# Patient Record
Sex: FEMALE | Race: WHITE | Employment: OTHER | URBAN - METROPOLITAN AREA
[De-identification: names, ages, dates, MRNs, and addresses within clinical notes are randomized per-mention and may not be internally consistent; named-entity substitution may affect disease eponyms.]

---

## 2018-10-18 ENCOUNTER — OFFICE VISIT (OUTPATIENT)
Dept: GASTROENTEROLOGY | Age: 58
End: 2018-10-18
Payer: COMMERCIAL

## 2018-10-18 ENCOUNTER — HOSPITAL ENCOUNTER (OUTPATIENT)
Age: 58
Discharge: HOME | End: 2018-10-18
Payer: COMMERCIAL

## 2018-10-18 VITALS
WEIGHT: 117.9 LBS | SYSTOLIC BLOOD PRESSURE: 124 MMHG | BODY MASS INDEX: 21.56 KG/M2 | HEART RATE: 112 BPM | DIASTOLIC BLOOD PRESSURE: 86 MMHG

## 2018-10-18 DIAGNOSIS — R10.13 ABDOMINAL PAIN, EPIGASTRIC: ICD-10-CM

## 2018-10-18 DIAGNOSIS — K29.70 GASTRITIS WITHOUT BLEEDING, UNSPECIFIED CHRONICITY, UNSPECIFIED GASTRITIS TYPE: Primary | ICD-10-CM

## 2018-10-18 DIAGNOSIS — K29.70 GASTRITIS WITHOUT BLEEDING, UNSPECIFIED CHRONICITY, UNSPECIFIED GASTRITIS TYPE: ICD-10-CM

## 2018-10-18 DIAGNOSIS — R19.7 DIARRHEA, UNSPECIFIED TYPE: ICD-10-CM

## 2018-10-18 LAB
ABSOLUTE EOS #: 0.3 K/UL (ref 0–0.4)
ABSOLUTE IMMATURE GRANULOCYTE: ABNORMAL K/UL (ref 0–0.3)
ABSOLUTE LYMPH #: 1.6 K/UL (ref 1–4.8)
ABSOLUTE MONO #: 0.4 K/UL (ref 0.1–1.3)
ALBUMIN SERPL-MCNC: 4.8 G/DL (ref 3.5–5.2)
ALBUMIN/GLOBULIN RATIO: ABNORMAL (ref 1–2.5)
ALP BLD-CCNC: 71 U/L (ref 35–104)
ALT SERPL-CCNC: 13 U/L (ref 5–33)
ANION GAP SERPL CALCULATED.3IONS-SCNC: 12 MMOL/L (ref 9–17)
AST SERPL-CCNC: 18 U/L
BASOPHILS # BLD: 1 % (ref 0–2)
BASOPHILS ABSOLUTE: 0.1 K/UL (ref 0–0.2)
BILIRUB SERPL-MCNC: 0.51 MG/DL (ref 0.3–1.2)
BUN BLDV-MCNC: 15 MG/DL (ref 6–20)
BUN/CREAT BLD: ABNORMAL (ref 9–20)
CALCIUM SERPL-MCNC: 10.1 MG/DL (ref 8.6–10.4)
CHLORIDE BLD-SCNC: 102 MMOL/L (ref 98–107)
CO2: 28 MMOL/L (ref 20–31)
CREAT SERPL-MCNC: 0.89 MG/DL (ref 0.5–0.9)
DIFFERENTIAL TYPE: ABNORMAL
EOSINOPHILS RELATIVE PERCENT: 6 % (ref 0–4)
GFR AFRICAN AMERICAN: >60 ML/MIN
GFR NON-AFRICAN AMERICAN: >60 ML/MIN
GFR SERPL CREATININE-BSD FRML MDRD: ABNORMAL ML/MIN/{1.73_M2}
GFR SERPL CREATININE-BSD FRML MDRD: ABNORMAL ML/MIN/{1.73_M2}
GLUCOSE BLD-MCNC: 100 MG/DL (ref 70–99)
HCT VFR BLD CALC: 39.3 % (ref 36–46)
HEMOGLOBIN: 13.3 G/DL (ref 12–16)
IMMATURE GRANULOCYTES: ABNORMAL %
LIPASE: 77 U/L (ref 13–60)
LYMPHOCYTES # BLD: 29 % (ref 24–44)
MCH RBC QN AUTO: 30.3 PG (ref 26–34)
MCHC RBC AUTO-ENTMCNC: 33.8 G/DL (ref 31–37)
MCV RBC AUTO: 89.6 FL (ref 80–100)
MONOCYTES # BLD: 8 % (ref 1–7)
NRBC AUTOMATED: ABNORMAL PER 100 WBC
PDW BLD-RTO: 12.9 % (ref 11.5–14.9)
PLATELET # BLD: 225 K/UL (ref 150–450)
PLATELET ESTIMATE: ABNORMAL
PMV BLD AUTO: 9.1 FL (ref 6–12)
POTASSIUM SERPL-SCNC: 4.4 MMOL/L (ref 3.7–5.3)
RBC # BLD: 4.39 M/UL (ref 4–5.2)
RBC # BLD: ABNORMAL 10*6/UL
SEG NEUTROPHILS: 56 % (ref 36–66)
SEGMENTED NEUTROPHILS ABSOLUTE COUNT: 3 K/UL (ref 1.3–9.1)
SODIUM BLD-SCNC: 142 MMOL/L (ref 135–144)
TOTAL PROTEIN: 7.5 G/DL (ref 6.4–8.3)
TSH SERPL DL<=0.05 MIU/L-ACNC: 2.86 MIU/L (ref 0.3–5)
WBC # BLD: 5.4 K/UL (ref 3.5–11)
WBC # BLD: ABNORMAL 10*3/UL

## 2018-10-18 PROCEDURE — 80053 COMPREHEN METABOLIC PANEL: CPT

## 2018-10-18 PROCEDURE — 36415 COLL VENOUS BLD VENIPUNCTURE: CPT

## 2018-10-18 PROCEDURE — 83690 ASSAY OF LIPASE: CPT

## 2018-10-18 PROCEDURE — 83516 IMMUNOASSAY NONANTIBODY: CPT

## 2018-10-18 PROCEDURE — 84443 ASSAY THYROID STIM HORMONE: CPT

## 2018-10-18 PROCEDURE — 99244 OFF/OP CNSLTJ NEW/EST MOD 40: CPT | Performed by: INTERNAL MEDICINE

## 2018-10-18 PROCEDURE — 85025 COMPLETE CBC W/AUTO DIFF WBC: CPT

## 2018-10-18 RX ORDER — PANTOPRAZOLE SODIUM 40 MG/1
40 TABLET, DELAYED RELEASE ORAL 2 TIMES DAILY
Refills: 2 | COMMUNITY
Start: 2018-10-08 | End: 2018-11-16

## 2018-10-18 RX ORDER — IBUPROFEN 800 MG/1
800 TABLET ORAL EVERY 8 HOURS PRN
Status: ON HOLD | COMMUNITY
Start: 2017-08-14 | End: 2018-11-07

## 2018-10-18 ASSESSMENT — ENCOUNTER SYMPTOMS
COUGH: 0
WHEEZING: 0
ANAL BLEEDING: 0
EYES NEGATIVE: 1
BLOOD IN STOOL: 0
DIARRHEA: 1
VOMITING: 0
ABDOMINAL DISTENTION: 0
CONSTIPATION: 0
TROUBLE SWALLOWING: 0
NAUSEA: 0
RECTAL PAIN: 0
ABDOMINAL PAIN: 0
CHOKING: 0

## 2018-10-18 NOTE — PROGRESS NOTES
Subjective:      Patient ID: Lexy Cerrato is a 66 y.o. female. HPI . Dr. Ceci Waller has requested that I see Lexy Cerrato for a consult for   1. Gastritis without bleeding, unspecified chronicity, unspecified gastritis type     . Pain  At night  pprtonix  1 week  Stress 2 yrs  Anxiety  Upper lt upper  Day time  Ok    Nausea  No  Bloating   Loose  Stools  3 - 4  timesno noct  bm 1 yr  Above symtos  6 weeks  Wt loss 8 lb  1 yr      Patient seen with the symptom of abdominal pain. The pain is mostly in the upper abdomen towards the left upper quadrant. Interestingly patient states that the pain is worse at night. During the daytime the pain is not noticeable significantly. No radiation of the pain. The pain is crampy ache like pain. Not related to micturition. No fever or chills. No history of trauma. Denies cough. No shortness of breath. Not associated with breathing. Patient last 8 pounds in the last 1 year. She does have constant nausea. No emesis. She denies abdominal bloating. Has 3-4 liquid stools a day. No hematochezia. No nocturnal bowel movements. Denies constipation. Patient has these issues in the last 6 weeks. Patient has significant anxiety. She also has stressful lifestyle. Past Medical, Family, and Social History reviewed and does contribute to the patient presenting condition. patient\"s PMH/PSH,SH,PSYCH hx, MEDs, ALLERGIES, and ROS was all reviewed and updated ion the appropriate sections    Review of Systems   Constitutional: Negative for activity change, appetite change, fatigue and fever. HENT: Negative for postnasal drip and trouble swallowing. Eyes: Negative. Respiratory: Negative for cough, choking and wheezing. Cardiovascular: Negative. Gastrointestinal: Positive for diarrhea. Negative for abdominal distention, abdominal pain, anal bleeding, blood in stool, constipation, nausea, rectal pain and vomiting. Endocrine: Negative.

## 2018-10-19 ENCOUNTER — TELEPHONE (OUTPATIENT)
Dept: GASTROENTEROLOGY | Age: 58
End: 2018-10-19

## 2018-10-19 LAB — TISSUE TRANSGLUTAMINASE IGA: 0.2 U/ML

## 2018-10-19 RX ORDER — POLYETHYLENE GLYCOL 3350 17 G/17G
POWDER, FOR SOLUTION ORAL
Qty: 255 G | Refills: 0 | Status: ON HOLD | OUTPATIENT
Start: 2018-10-19 | End: 2018-11-07 | Stop reason: ALTCHOICE

## 2018-10-19 NOTE — TELEPHONE ENCOUNTER
Pt calling about the pretesting you and her were talking about.  Please call her back as the colon is scheduled for monday

## 2018-10-24 ENCOUNTER — HOSPITAL ENCOUNTER (OUTPATIENT)
Dept: CT IMAGING | Age: 58
Discharge: HOME | End: 2018-10-26
Payer: COMMERCIAL

## 2018-10-24 DIAGNOSIS — R10.13 ABDOMINAL PAIN, EPIGASTRIC: ICD-10-CM

## 2018-10-24 PROCEDURE — 2580000003 HC RX 258: Performed by: INTERNAL MEDICINE

## 2018-10-24 PROCEDURE — 6360000004 HC RX CONTRAST MEDICATION: Performed by: INTERNAL MEDICINE

## 2018-10-24 PROCEDURE — 74177 CT ABD & PELVIS W/CONTRAST: CPT

## 2018-10-24 RX ORDER — 0.9 % SODIUM CHLORIDE 0.9 %
80 INTRAVENOUS SOLUTION INTRAVENOUS ONCE
Status: COMPLETED | OUTPATIENT
Start: 2018-10-24 | End: 2018-10-24

## 2018-10-24 RX ORDER — SODIUM CHLORIDE 0.9 % (FLUSH) 0.9 %
10 SYRINGE (ML) INJECTION PRN
Status: DISCONTINUED | OUTPATIENT
Start: 2018-10-24 | End: 2018-10-27 | Stop reason: HOSPADM

## 2018-10-24 RX ADMIN — IOHEXOL 50 ML: 240 INJECTION, SOLUTION INTRATHECAL; INTRAVASCULAR; INTRAVENOUS; ORAL at 16:31

## 2018-10-24 RX ADMIN — SODIUM CHLORIDE 80 ML: 9 INJECTION, SOLUTION INTRAVENOUS at 16:31

## 2018-10-24 RX ADMIN — IOPAMIDOL 75 ML: 755 INJECTION, SOLUTION INTRAVENOUS at 16:31

## 2018-10-24 RX ADMIN — Medication 10 ML: at 16:31

## 2018-10-30 ENCOUNTER — TELEPHONE (OUTPATIENT)
Dept: GASTROENTEROLOGY | Age: 58
End: 2018-10-30

## 2018-11-01 ENCOUNTER — TELEPHONE (OUTPATIENT)
Dept: GASTROENTEROLOGY | Age: 58
End: 2018-11-01

## 2018-11-01 DIAGNOSIS — R93.5 ABNORMAL CT OF THE ABDOMEN: Primary | ICD-10-CM

## 2018-11-02 ENCOUNTER — TELEPHONE (OUTPATIENT)
Dept: GASTROENTEROLOGY | Age: 58
End: 2018-11-02

## 2018-11-02 NOTE — TELEPHONE ENCOUNTER
Patient called today asking about how to schedule her EUS and if she has to follow up with the doctor, Lashaun Leon stated that that our schedulers would call her and that she will have to follow up with Yesi Che afterwards

## 2018-11-07 ENCOUNTER — ANESTHESIA (OUTPATIENT)
Dept: OPERATING ROOM | Age: 58
End: 2018-11-07
Payer: COMMERCIAL

## 2018-11-07 ENCOUNTER — ANESTHESIA EVENT (OUTPATIENT)
Dept: OPERATING ROOM | Age: 58
End: 2018-11-07
Payer: COMMERCIAL

## 2018-11-07 ENCOUNTER — HOSPITAL ENCOUNTER (OUTPATIENT)
Age: 58
Setting detail: OUTPATIENT SURGERY
Discharge: HOME | End: 2018-11-07
Attending: INTERNAL MEDICINE | Admitting: INTERNAL MEDICINE
Payer: COMMERCIAL

## 2018-11-07 VITALS
RESPIRATION RATE: 15 BRPM | DIASTOLIC BLOOD PRESSURE: 76 MMHG | OXYGEN SATURATION: 99 % | SYSTOLIC BLOOD PRESSURE: 127 MMHG

## 2018-11-07 VITALS
DIASTOLIC BLOOD PRESSURE: 86 MMHG | TEMPERATURE: 99 F | WEIGHT: 117 LBS | BODY MASS INDEX: 19.97 KG/M2 | SYSTOLIC BLOOD PRESSURE: 150 MMHG | HEART RATE: 50 BPM | OXYGEN SATURATION: 99 % | RESPIRATION RATE: 16 BRPM | HEIGHT: 64 IN

## 2018-11-07 PROCEDURE — 7100000031 HC ASPR PHASE II RECOVERY - ADDTL 15 MIN: Performed by: INTERNAL MEDICINE

## 2018-11-07 PROCEDURE — 3700000001 HC ADD 15 MINUTES (ANESTHESIA): Performed by: INTERNAL MEDICINE

## 2018-11-07 PROCEDURE — 2709999900 HC NON-CHARGEABLE SUPPLY: Performed by: INTERNAL MEDICINE

## 2018-11-07 PROCEDURE — 43242 EGD US FINE NEEDLE BX/ASPIR: CPT | Performed by: INTERNAL MEDICINE

## 2018-11-07 PROCEDURE — 3609018700 HC ENDOSCOPIC ULTRASOUND: Performed by: INTERNAL MEDICINE

## 2018-11-07 PROCEDURE — 88307 TISSUE EXAM BY PATHOLOGIST: CPT

## 2018-11-07 PROCEDURE — 2720000010 HC SURG SUPPLY STERILE: Performed by: INTERNAL MEDICINE

## 2018-11-07 PROCEDURE — 6370000000 HC RX 637 (ALT 250 FOR IP): Performed by: INTERNAL MEDICINE

## 2018-11-07 PROCEDURE — 2580000003 HC RX 258: Performed by: ANESTHESIOLOGY

## 2018-11-07 PROCEDURE — 6360000002 HC RX W HCPCS

## 2018-11-07 PROCEDURE — 7100000030 HC ASPR PHASE II RECOVERY - FIRST 15 MIN: Performed by: INTERNAL MEDICINE

## 2018-11-07 PROCEDURE — 7100000000 HC PACU RECOVERY - FIRST 15 MIN: Performed by: INTERNAL MEDICINE

## 2018-11-07 PROCEDURE — 3700000000 HC ANESTHESIA ATTENDED CARE: Performed by: INTERNAL MEDICINE

## 2018-11-07 PROCEDURE — 2500000003 HC RX 250 WO HCPCS

## 2018-11-07 PROCEDURE — 7100000001 HC PACU RECOVERY - ADDTL 15 MIN: Performed by: INTERNAL MEDICINE

## 2018-11-07 RX ORDER — HYDROCODONE BITARTRATE AND ACETAMINOPHEN 5; 325 MG/1; MG/1
1 TABLET ORAL PRN
Status: DISCONTINUED | OUTPATIENT
Start: 2018-11-07 | End: 2018-11-07 | Stop reason: HOSPADM

## 2018-11-07 RX ORDER — FENTANYL CITRATE 50 UG/ML
25 INJECTION, SOLUTION INTRAMUSCULAR; INTRAVENOUS EVERY 5 MIN PRN
Status: DISCONTINUED | OUTPATIENT
Start: 2018-11-07 | End: 2018-11-07 | Stop reason: HOSPADM

## 2018-11-07 RX ORDER — HYDRALAZINE HYDROCHLORIDE 20 MG/ML
5 INJECTION INTRAMUSCULAR; INTRAVENOUS EVERY 10 MIN PRN
Status: DISCONTINUED | OUTPATIENT
Start: 2018-11-07 | End: 2018-11-07 | Stop reason: HOSPADM

## 2018-11-07 RX ORDER — 0.9 % SODIUM CHLORIDE 0.9 %
500 INTRAVENOUS SOLUTION INTRAVENOUS
Status: DISCONTINUED | OUTPATIENT
Start: 2018-11-07 | End: 2018-11-07 | Stop reason: HOSPADM

## 2018-11-07 RX ORDER — MORPHINE SULFATE 2 MG/ML
2 INJECTION, SOLUTION INTRAMUSCULAR; INTRAVENOUS EVERY 5 MIN PRN
Status: DISCONTINUED | OUTPATIENT
Start: 2018-11-07 | End: 2018-11-07 | Stop reason: HOSPADM

## 2018-11-07 RX ORDER — HYDROCODONE BITARTRATE AND ACETAMINOPHEN 5; 325 MG/1; MG/1
2 TABLET ORAL PRN
Status: DISCONTINUED | OUTPATIENT
Start: 2018-11-07 | End: 2018-11-07 | Stop reason: HOSPADM

## 2018-11-07 RX ORDER — MEPERIDINE HYDROCHLORIDE 50 MG/ML
12.5 INJECTION INTRAMUSCULAR; INTRAVENOUS; SUBCUTANEOUS EVERY 5 MIN PRN
Status: DISCONTINUED | OUTPATIENT
Start: 2018-11-07 | End: 2018-11-07 | Stop reason: HOSPADM

## 2018-11-07 RX ORDER — LIDOCAINE HYDROCHLORIDE 10 MG/ML
INJECTION, SOLUTION EPIDURAL; INFILTRATION; INTRACAUDAL; PERINEURAL PRN
Status: DISCONTINUED | OUTPATIENT
Start: 2018-11-07 | End: 2018-11-07 | Stop reason: SDUPTHER

## 2018-11-07 RX ORDER — SODIUM CHLORIDE, SODIUM LACTATE, POTASSIUM CHLORIDE, CALCIUM CHLORIDE 600; 310; 30; 20 MG/100ML; MG/100ML; MG/100ML; MG/100ML
INJECTION, SOLUTION INTRAVENOUS CONTINUOUS
Status: DISCONTINUED | OUTPATIENT
Start: 2018-11-07 | End: 2018-11-07 | Stop reason: HOSPADM

## 2018-11-07 RX ORDER — ACETAMINOPHEN 500 MG
500 TABLET ORAL EVERY 6 HOURS PRN
COMMUNITY
End: 2018-11-16

## 2018-11-07 RX ORDER — DIPHENHYDRAMINE HYDROCHLORIDE 50 MG/ML
12.5 INJECTION INTRAMUSCULAR; INTRAVENOUS
Status: DISCONTINUED | OUTPATIENT
Start: 2018-11-07 | End: 2018-11-07 | Stop reason: HOSPADM

## 2018-11-07 RX ORDER — PROMETHAZINE HYDROCHLORIDE 25 MG/ML
6.25 INJECTION, SOLUTION INTRAMUSCULAR; INTRAVENOUS
Status: DISCONTINUED | OUTPATIENT
Start: 2018-11-07 | End: 2018-11-07 | Stop reason: HOSPADM

## 2018-11-07 RX ORDER — PROPOFOL 10 MG/ML
INJECTION, EMULSION INTRAVENOUS PRN
Status: DISCONTINUED | OUTPATIENT
Start: 2018-11-07 | End: 2018-11-07 | Stop reason: SDUPTHER

## 2018-11-07 RX ADMIN — PROPOFOL 40 MG: 10 INJECTION, EMULSION INTRAVENOUS at 12:57

## 2018-11-07 RX ADMIN — PROPOFOL 20 MG: 10 INJECTION, EMULSION INTRAVENOUS at 13:01

## 2018-11-07 RX ADMIN — PROPOFOL 80 MG: 10 INJECTION, EMULSION INTRAVENOUS at 12:56

## 2018-11-07 RX ADMIN — PROPOFOL 20 MG: 10 INJECTION, EMULSION INTRAVENOUS at 13:00

## 2018-11-07 RX ADMIN — PROPOFOL 20 MG: 10 INJECTION, EMULSION INTRAVENOUS at 12:59

## 2018-11-07 RX ADMIN — SODIUM CHLORIDE, POTASSIUM CHLORIDE, SODIUM LACTATE AND CALCIUM CHLORIDE: 600; 310; 30; 20 INJECTION, SOLUTION INTRAVENOUS at 11:14

## 2018-11-07 RX ADMIN — LIDOCAINE HYDROCHLORIDE 50 MG: 10 INJECTION, SOLUTION EPIDURAL; INFILTRATION; INTRACAUDAL; PERINEURAL at 12:56

## 2018-11-07 RX ADMIN — PROPOFOL 20 MG: 10 INJECTION, EMULSION INTRAVENOUS at 12:58

## 2018-11-07 RX ADMIN — PROPOFOL 20 MG: 10 INJECTION, EMULSION INTRAVENOUS at 13:05

## 2018-11-07 RX ADMIN — PROPOFOL 20 MG: 10 INJECTION, EMULSION INTRAVENOUS at 13:03

## 2018-11-07 RX ADMIN — PROPOFOL 20 MG: 10 INJECTION, EMULSION INTRAVENOUS at 13:08

## 2018-11-07 RX ADMIN — PROPOFOL 20 MG: 10 INJECTION, EMULSION INTRAVENOUS at 13:15

## 2018-11-07 RX ADMIN — PROPOFOL 20 MG: 10 INJECTION, EMULSION INTRAVENOUS at 13:10

## 2018-11-07 RX ADMIN — PROPOFOL 20 MG: 10 INJECTION, EMULSION INTRAVENOUS at 13:13

## 2018-11-07 ASSESSMENT — PULMONARY FUNCTION TESTS
PIF_VALUE: 0

## 2018-11-07 ASSESSMENT — PAIN DESCRIPTION - LOCATION
LOCATION: ABDOMEN
LOCATION: ABDOMEN

## 2018-11-07 ASSESSMENT — PAIN DESCRIPTION - FREQUENCY
FREQUENCY: CONTINUOUS
FREQUENCY: CONTINUOUS

## 2018-11-07 ASSESSMENT — PAIN DESCRIPTION - PROGRESSION
CLINICAL_PROGRESSION: NOT CHANGED
CLINICAL_PROGRESSION: GRADUALLY IMPROVING

## 2018-11-07 ASSESSMENT — PAIN DESCRIPTION - ONSET
ONSET: ON-GOING
ONSET: ON-GOING

## 2018-11-07 ASSESSMENT — PAIN DESCRIPTION - ORIENTATION
ORIENTATION: LOWER;LEFT
ORIENTATION: LEFT;LOWER

## 2018-11-07 ASSESSMENT — PAIN DESCRIPTION - DESCRIPTORS
DESCRIPTORS: DISCOMFORT;DULL
DESCRIPTORS: DISCOMFORT;DULL
DESCRIPTORS: DULL;DISCOMFORT

## 2018-11-07 ASSESSMENT — PAIN DESCRIPTION - PAIN TYPE
TYPE: SURGICAL PAIN
TYPE: SURGICAL PAIN

## 2018-11-07 ASSESSMENT — PAIN SCALES - GENERAL
PAINLEVEL_OUTOF10: 2
PAINLEVEL_OUTOF10: 3
PAINLEVEL_OUTOF10: 0

## 2018-11-07 ASSESSMENT — PAIN - FUNCTIONAL ASSESSMENT: PAIN_FUNCTIONAL_ASSESSMENT: 0-10

## 2018-11-07 NOTE — OP NOTE
SMA. Mass is solid with adherence to gastric wall. The mass is very close to celiac trunk and seems to involve arterial branches. FNB was performed x 1. RECOMMENDATIONS:   1. Follow up on pathology report. 2. Follow up with referring physician. 3. Refer to pancreatic surgery.          Ankita Cavazos MD [facility]

## 2018-11-07 NOTE — ANESTHESIA PRE PROCEDURE
Department of Anesthesiology  Preprocedure Note       Name:  Ade Corona   Age:  66 y.o.  :  1960                                          MRN:  [id number]         Date:  2018      Surgeon: Margie Carr):  Ankita Cavazos MD    Procedure: ENDOSCOPIC ULTRASOUND (N/A Esophagus)    Medications prior to admission:   Prior to Admission medications    Medication Sig Start Date End Date Taking? Authorizing Provider   acetaminophen (TYLENOL) 500 MG tablet Take 500 mg by mouth every 6 hours as needed for Pain   Yes Historical Provider, MD   pantoprazole (PROTONIX) 40 MG tablet Take 40 mg by mouth 2 times daily 10/8/18  Yes Historical Provider, MD   Loratadine (CLARITIN) 10 MG CAPS Take  by mouth.       Historical Provider, MD       Current medications:    Current Facility-Administered Medications   Medication Dose Route Frequency Provider Last Rate Last Dose    lactated ringers infusion   Intravenous Continuous Poppy Thornton  mL/hr at 18 1114         Allergies:  No Known Allergies    Problem List:    Patient Active Problem List   Diagnosis Code    Gastritis without bleeding K29.70       Past Medical History:        Diagnosis Date    Asthma     Congenital heart disease     Gastritis without bleeding 10/18/2018    History of abnormal cervical Pap smear 2000    Pre-Cancerous Cells    Seasonal allergies        Past Surgical History:        Procedure Laterality Date    BREAST BIOPSY  2009    BREAST REDUCTION SURGERY  1982    COLONOSCOPY  2002    ENDOSCOPY, COLON, DIAGNOSTIC         Social History:    Social History   Substance Use Topics    Smoking status: Never Smoker    Smokeless tobacco: Never Used    Alcohol use No                                Counseling given: Not Answered      Vital Signs (Current):   Vitals:    18 1042   BP: 131/80   Pulse: 83   Resp: 16   Temp: 98.2 °F (36.8 °C)   TempSrc: Oral   SpO2: 100%   Weight: 117 lb (53.1 kg)   Height: 5' 4\" (1.626 m)

## 2018-11-08 ENCOUNTER — TELEPHONE (OUTPATIENT)
Dept: GASTROENTEROLOGY | Age: 58
End: 2018-11-08

## 2018-11-08 LAB — SURGICAL PATHOLOGY REPORT: NORMAL

## 2018-11-08 NOTE — TELEPHONE ENCOUNTER
Pt called requesting results from path report. Explained that the results are not in as of yet. Pt is very nervous and concerned because of the mass that was found on her pancreas. Also wanting to know why she has to be seen soon if we do not have the results. Explained that since Dr Sybil Rees told her she would need surgery either way we wanted to be sure that she was seen in office so that way we can take care of her. Patient understood but is very concerned regarding testing, I explained to her that we were here to help her and would be here if she needed anything. Also asked pt to complete labs that were ordered for pt.

## 2018-11-12 ENCOUNTER — TELEPHONE (OUTPATIENT)
Dept: GASTROENTEROLOGY | Age: 58
End: 2018-11-12

## 2018-11-12 NOTE — TELEPHONE ENCOUNTER
Write called back regarding lab results. Writer spoke with pt stating still haven't received results, lab results were sent out. Writer states as soon as we get results we will contact pt. Pt verbalizes she understands.

## 2018-11-15 ENCOUNTER — TELEPHONE (OUTPATIENT)
Dept: GASTROENTEROLOGY | Age: 58
End: 2018-11-15

## 2018-11-15 DIAGNOSIS — C25.9 MALIGNANT NEOPLASM OF PANCREAS, UNSPECIFIED LOCATION OF MALIGNANCY (HCC): Primary | ICD-10-CM

## 2018-11-15 NOTE — TELEPHONE ENCOUNTER
Patient called regarding pathology report results. I informed patient that the nurse was not currently available to speak with her, however someone will call her back in regards to results.

## 2018-11-16 ENCOUNTER — TELEPHONE (OUTPATIENT)
Dept: ONCOLOGY | Age: 58
End: 2018-11-16

## 2018-11-16 ENCOUNTER — INITIAL CONSULT (OUTPATIENT)
Dept: ONCOLOGY | Age: 58
End: 2018-11-16
Payer: COMMERCIAL

## 2018-11-16 ENCOUNTER — HOSPITAL ENCOUNTER (OUTPATIENT)
Facility: MEDICAL CENTER | Age: 58
End: 2018-11-16
Payer: COMMERCIAL

## 2018-11-16 ENCOUNTER — TELEPHONE (OUTPATIENT)
Dept: GASTROENTEROLOGY | Age: 58
End: 2018-11-16

## 2018-11-16 VITALS
BODY MASS INDEX: 20.09 KG/M2 | HEIGHT: 64 IN | TEMPERATURE: 97.6 F | HEART RATE: 76 BPM | RESPIRATION RATE: 18 BRPM | DIASTOLIC BLOOD PRESSURE: 81 MMHG | SYSTOLIC BLOOD PRESSURE: 123 MMHG | WEIGHT: 117.7 LBS

## 2018-11-16 DIAGNOSIS — C25.1 MALIGNANT NEOPLASM OF BODY OF PANCREAS (HCC): ICD-10-CM

## 2018-11-16 DIAGNOSIS — F41.9 ANXIETY: ICD-10-CM

## 2018-11-16 PROCEDURE — 99245 OFF/OP CONSLTJ NEW/EST HI 55: CPT | Performed by: INTERNAL MEDICINE

## 2018-11-16 PROCEDURE — 99201 HC NEW PT, E/M LEVEL 1: CPT

## 2018-11-16 RX ORDER — LORAZEPAM 0.5 MG/1
0.5 TABLET ORAL EVERY 8 HOURS PRN
Qty: 60 TABLET | Refills: 0 | Status: SHIPPED | OUTPATIENT
Start: 2018-11-16 | End: 2019-02-12 | Stop reason: SDUPTHER

## 2018-11-16 NOTE — PROGRESS NOTES
DIAGNOSIS:   1. Pancreatic invasive ductal adenocarcinoma    CURRENT THERAPY:  Workup in progress     BRIEF CASE HISTORY:  Praveen Loya is a very pleasant 66 y.o. female who is referred to us for pancreatic cancer. She reports abdominal pain started 09/2018, worse at night, she consulted with PCP and she started acid reducer. She was referred to GI and EGD and colonoscopy was done - both negative, CT was done showing mass in the body of the pancreas measuring 3.6 x 2.6 x 2.5 cm. She underwent endoscopic ultrasound for biopsy, pathology confirmed pancreatic invasive ductal adenocarcinoma. She has had some weight loss, she has had a lot of stress and is unsure if it is disease related. She has had insomnia due to discomfort. She has consult scheduled with surgeon. Mother had breast cancer. She has no history of chemical or radiation exposure. PAST MEDICAL HISTORY: has a past medical history of Asthma; Congenital heart disease; Gastritis without bleeding; History of abnormal cervical Pap smear; and Seasonal allergies. PAST SURGICAL HISTORY: has a past surgical history that includes Breast reduction surgery (1982); Colonoscopy (2002); Breast biopsy (2009); Endoscopy, colon, diagnostic; and pr gi endoscopic ultrasound (N/A, 11/7/2018). CURRENT MEDICATIONS:  currently has no medications in their medication list.    ALLERGIES:  has No Known Allergies. FAMILY HISTORY: Mother: breast cancer    SOCIAL HISTORY:  reports that she has never smoked. She has never used smokeless tobacco. She reports that she does not drink alcohol or use drugs. REVIEW OF SYSTEMS:   General: No fever or night sweats. Weight is stable. Insomnia due to discomfort  ENT: No double or blurred vision, no tinnitus or hearing problem, no dysphagia or sore throat   Respiratory: No chest pain, no shortness of breath, no cough or hemoptysis. Cardiovascular: Denies chest pain, PND or orthopnea.  No L E swelling or

## 2018-11-16 NOTE — TELEPHONE ENCOUNTER
[facility] ARRIVES AMBULATORY FOR CONSULTATION  DR Ragena Jeans IN TO SEE PATIENT  ORDERS RECEIVED  NEW CHEMO ORDERS   LABS CDP CMP CA 19-9  THIS WEEK.  PATIENT TO DO THEM AT OUTSIDE FACILITY  JEAN MALLORY ( [place]) AVS FAXED TO [phone]  AVS PRINTED AND GIVEN TO PATIENT WITH INSTRUCTIONS  PATIENT DISCHARGED AMBULATORY

## 2018-11-16 NOTE — LETTER
Germania Ruiz MD    11/22/2018     Karan Counter Blunt   [address] [place] [address]    Dear [address] : Thank you for referring Yanique Melissa, 1960, to me for evaluation. Below are the relevant portions of my assessment and plan of care. DIAGNOSIS:   1. Pancreatic invasive ductal adenocarcinoma    CURRENT THERAPY:  Workup in progress     BRIEF CASE HISTORY:  Yanique Melissa is a very pleasant 66 y.o. female who is referred to us for pancreatic cancer. She reports abdominal pain started 09/2018, worse at night, she consulted with PCP and she started acid reducer. She was referred to GI and EGD and colonoscopy was done - both negative, CT was done showing mass in the body of the pancreas measuring 3.6 x 2.6 x 2.5 cm. She underwent endoscopic ultrasound for biopsy, pathology confirmed pancreatic invasive ductal adenocarcinoma. She has had some weight loss, she has had a lot of stress and is unsure if it is disease related. She has had insomnia due to discomfort. She has consult scheduled with surgeon. Mother had breast cancer. She has no history of chemical or radiation exposure. PAST MEDICAL HISTORY: has a past medical history of Asthma; Congenital heart disease; Gastritis without bleeding; History of abnormal cervical Pap smear; and Seasonal allergies. PAST SURGICAL HISTORY: has a past surgical history that includes Breast reduction surgery (1982); Colonoscopy (2002); Breast biopsy (2009); Endoscopy, colon, diagnostic; and pr gi endoscopic ultrasound (N/A, 11/7/2018). CURRENT MEDICATIONS:  currently has no medications in their medication list.    ALLERGIES:  has No Known Allergies. FAMILY HISTORY: Mother: breast cancer    SOCIAL HISTORY:  reports that she has never smoked. She has never used smokeless tobacco. She reports that she does not drink alcohol or use drugs. REVIEW OF SYSTEMS:   General: No fever or night sweats. Weight is stable.  Insomnia due to discomfort

## 2018-11-16 NOTE — TELEPHONE ENCOUNTER
Received call from [facility], Dr. Katja Madera office, stating pt called in requesting consult appt. [facility] stated have not received referral from Dr. Shazia Coffman office, saving appt time on 18, & requested writer assist obtaining referral.  Upon review of EPIC noted pt notified of pathology & referral placed for Dr. Myah Martinez only. Spoke with Melva Prasad, Dr. Shazia Coffman office, updated on conversation with [facility]. Melva Prasad stated will message nurse now & request referral placed. Spoke with Mir Ritter, [facility] , inquired if pt's insurance covered @ [facility], Mir Ritter stated yes. Name: Renny Ivy  : 1960  MRN: [id number]    Oncology Navigation- Initial Note:    Intake-  Contact Type: Telephone    Notes: Introductory phone call made to patient, instructed patient writer will be navigating oncology care & may call writer with any questions/concerns prn @ [phone]. Notified pt writer spoke with Dr. Katja Madera office this am & spoke with Dr. Shazia Coffman office to request referral for Dr. Josette Fischer faxed stat. Notified pt Dr. Myah Martinez out of office next week & will speak with him directly to coordinate consult asap. Pt stated willing to be seen @ [facility] or [facility] for initial consult. Spoke with Dr. Myah Martinez via telephone, updated on pt & inquired when to schedule. Dr. Myah Martinez stated will see pt today @ [facility] @ 1600 if pt willing. Dr. Myah Martinez request writer contact pt to confirm appt & notify [facility]  after speaking with pt. Spoke with pt, updated on consult appt today @ [facility] @ 1600, pt agreeable. Instructed pt to bring photo id, insurance card, & list of medications. Spoke with [facility], [facility] , updated pt agreed to 1600 consult today. [address] written materials mailed to patient. Will continue to follow.

## 2018-11-19 ENCOUNTER — HOSPITAL ENCOUNTER (OUTPATIENT)
Age: 58
Discharge: HOME | End: 2018-11-19
Payer: COMMERCIAL

## 2018-11-19 DIAGNOSIS — C25.1 MALIGNANT NEOPLASM OF BODY OF PANCREAS (HCC): ICD-10-CM

## 2018-11-19 LAB
ABSOLUTE EOS #: 0.3 K/UL (ref 0–0.4)
ABSOLUTE IMMATURE GRANULOCYTE: ABNORMAL K/UL (ref 0–0.3)
ABSOLUTE LYMPH #: 1.1 K/UL (ref 1–4.8)
ABSOLUTE MONO #: 0.4 K/UL (ref 0.1–1.3)
ALBUMIN SERPL-MCNC: 4.5 G/DL (ref 3.5–5.2)
ALBUMIN/GLOBULIN RATIO: NORMAL (ref 1–2.5)
ALP BLD-CCNC: 67 U/L (ref 35–104)
ALT SERPL-CCNC: 11 U/L (ref 5–33)
ANION GAP SERPL CALCULATED.3IONS-SCNC: 11 MMOL/L (ref 9–17)
AST SERPL-CCNC: 18 U/L
BASOPHILS # BLD: 1 % (ref 0–2)
BASOPHILS ABSOLUTE: 0 K/UL (ref 0–0.2)
BILIRUB SERPL-MCNC: 0.56 MG/DL (ref 0.3–1.2)
BUN BLDV-MCNC: 19 MG/DL (ref 6–20)
BUN/CREAT BLD: NORMAL (ref 9–20)
CA 19-9: 3686 U/ML (ref 0–35)
CALCIUM SERPL-MCNC: 10.3 MG/DL (ref 8.6–10.4)
CHLORIDE BLD-SCNC: 102 MMOL/L (ref 98–107)
CO2: 28 MMOL/L (ref 20–31)
CREAT SERPL-MCNC: 0.72 MG/DL (ref 0.5–0.9)
DIFFERENTIAL TYPE: ABNORMAL
EOSINOPHILS RELATIVE PERCENT: 8 % (ref 0–4)
GFR AFRICAN AMERICAN: >60 ML/MIN
GFR NON-AFRICAN AMERICAN: >60 ML/MIN
GFR SERPL CREATININE-BSD FRML MDRD: NORMAL ML/MIN/{1.73_M2}
GFR SERPL CREATININE-BSD FRML MDRD: NORMAL ML/MIN/{1.73_M2}
GLUCOSE BLD-MCNC: 90 MG/DL (ref 70–99)
HCT VFR BLD CALC: 40.8 % (ref 36–46)
HEMOGLOBIN: 13.4 G/DL (ref 12–16)
IMMATURE GRANULOCYTES: ABNORMAL %
LYMPHOCYTES # BLD: 26 % (ref 24–44)
MCH RBC QN AUTO: 29.7 PG (ref 26–34)
MCHC RBC AUTO-ENTMCNC: 32.8 G/DL (ref 31–37)
MCV RBC AUTO: 90.4 FL (ref 80–100)
MONOCYTES # BLD: 9 % (ref 1–7)
NRBC AUTOMATED: ABNORMAL PER 100 WBC
PDW BLD-RTO: 13.2 % (ref 11.5–14.9)
PLATELET # BLD: 222 K/UL (ref 150–450)
PLATELET ESTIMATE: ABNORMAL
PMV BLD AUTO: 9.7 FL (ref 6–12)
POTASSIUM SERPL-SCNC: 4.8 MMOL/L (ref 3.7–5.3)
RBC # BLD: 4.52 M/UL (ref 4–5.2)
RBC # BLD: ABNORMAL 10*6/UL
SEG NEUTROPHILS: 56 % (ref 36–66)
SEGMENTED NEUTROPHILS ABSOLUTE COUNT: 2.5 K/UL (ref 1.3–9.1)
SODIUM BLD-SCNC: 141 MMOL/L (ref 135–144)
TOTAL PROTEIN: 7.3 G/DL (ref 6.4–8.3)
WBC # BLD: 4.4 K/UL (ref 3.5–11)
WBC # BLD: ABNORMAL 10*3/UL

## 2018-11-19 PROCEDURE — 85025 COMPLETE CBC W/AUTO DIFF WBC: CPT

## 2018-11-19 PROCEDURE — 86301 IMMUNOASSAY TUMOR CA 19-9: CPT

## 2018-11-19 PROCEDURE — 80053 COMPREHEN METABOLIC PANEL: CPT

## 2018-11-19 PROCEDURE — 36415 COLL VENOUS BLD VENIPUNCTURE: CPT

## 2018-11-20 ENCOUNTER — TELEPHONE (OUTPATIENT)
Dept: ONCOLOGY | Age: 58
End: 2018-11-20

## 2018-11-27 ENCOUNTER — TELEPHONE (OUTPATIENT)
Dept: INFUSION THERAPY | Age: 58
End: 2018-11-27

## 2018-11-28 RX ORDER — ONDANSETRON 8 MG/1
8 TABLET, ORALLY DISINTEGRATING ORAL EVERY 8 HOURS PRN
Qty: 90 TABLET | Refills: 3 | Status: SHIPPED | OUTPATIENT
Start: 2018-11-28

## 2018-11-28 RX ORDER — LIDOCAINE AND PRILOCAINE 25; 25 MG/G; MG/G
CREAM TOPICAL
Qty: 30 G | Refills: 1 | Status: SHIPPED | OUTPATIENT
Start: 2018-11-28

## 2018-11-29 ENCOUNTER — TELEPHONE (OUTPATIENT)
Dept: ONCOLOGY | Age: 58
End: 2018-11-29

## 2018-11-29 ENCOUNTER — OFFICE VISIT (OUTPATIENT)
Dept: ONCOLOGY | Age: 58
End: 2018-11-29
Payer: COMMERCIAL

## 2018-11-29 DIAGNOSIS — C25.1 MALIGNANT NEOPLASM OF BODY OF PANCREAS (HCC): ICD-10-CM

## 2018-11-29 PROCEDURE — 99214 OFFICE O/P EST MOD 30 MIN: CPT

## 2018-11-29 PROCEDURE — 99999 PR OFFICE/OUTPT VISIT,PROCEDURE ONLY: CPT | Performed by: INTERNAL MEDICINE

## 2018-11-29 NOTE — TELEPHONE ENCOUNTER
Name: Jennifer Adler  : 1960  MRN: [id number]    Oncology Navigation Follow-Up Note    Contact Type:  Medical Oncology    Notes: Pt @ [facility] for chemotherapy teaching. Met with pt & pt's spouse prior to teaching. Pt stated stopped taking norco r/t constipation. Pt c/o abdominal pain qhs & unable to find comfortable position. Encouraged pt to take norco qhs. Senokot-s written & verbal education given to pt. Instructed pt may add miralax daily prn if no results from senokot-s. Pt verbalized understanding & denied further questions/concerns/issues. Instructed pt may contact writer prn. Will continue to follow.

## 2018-12-04 ENCOUNTER — HOSPITAL ENCOUNTER (OUTPATIENT)
Dept: INFUSION THERAPY | Age: 58
Discharge: HOME | End: 2018-12-04
Payer: COMMERCIAL

## 2018-12-04 ENCOUNTER — TELEPHONE (OUTPATIENT)
Dept: ONCOLOGY | Age: 58
End: 2018-12-04

## 2018-12-04 ENCOUNTER — OFFICE VISIT (OUTPATIENT)
Dept: ONCOLOGY | Age: 58
End: 2018-12-04
Payer: COMMERCIAL

## 2018-12-04 VITALS
DIASTOLIC BLOOD PRESSURE: 87 MMHG | WEIGHT: 117.13 LBS | HEART RATE: 106 BPM | TEMPERATURE: 98.4 F | HEIGHT: 64 IN | SYSTOLIC BLOOD PRESSURE: 119 MMHG | BODY MASS INDEX: 20 KG/M2

## 2018-12-04 DIAGNOSIS — C25.1 MALIGNANT NEOPLASM OF BODY OF PANCREAS (HCC): Primary | ICD-10-CM

## 2018-12-04 DIAGNOSIS — F41.9 ANXIETY: ICD-10-CM

## 2018-12-04 DIAGNOSIS — C25.1 MALIGNANT NEOPLASM OF BODY OF PANCREAS (HCC): ICD-10-CM

## 2018-12-04 LAB
ABSOLUTE EOS #: 0.3 K/UL (ref 0–0.4)
ABSOLUTE IMMATURE GRANULOCYTE: ABNORMAL K/UL (ref 0–0.3)
ABSOLUTE LYMPH #: 0.9 K/UL (ref 1–4.8)
ABSOLUTE MONO #: 0.5 K/UL (ref 0.1–1.2)
ALBUMIN SERPL-MCNC: 4.6 G/DL (ref 3.5–5.2)
ALBUMIN/GLOBULIN RATIO: 1.6 (ref 1–2.5)
ALP BLD-CCNC: 63 U/L (ref 35–104)
ALT SERPL-CCNC: 11 U/L (ref 5–33)
ANION GAP SERPL CALCULATED.3IONS-SCNC: 13 MMOL/L (ref 9–17)
AST SERPL-CCNC: 16 U/L
BASOPHILS # BLD: 1 % (ref 0–2)
BASOPHILS ABSOLUTE: 0 K/UL (ref 0–0.2)
BILIRUB SERPL-MCNC: 0.49 MG/DL (ref 0.3–1.2)
BUN BLDV-MCNC: 20 MG/DL (ref 6–20)
BUN/CREAT BLD: NORMAL (ref 9–20)
CALCIUM SERPL-MCNC: 9.9 MG/DL (ref 8.6–10.4)
CHLORIDE BLD-SCNC: 101 MMOL/L (ref 98–107)
CO2: 24 MMOL/L (ref 20–31)
CREAT SERPL-MCNC: 0.7 MG/DL (ref 0.5–0.9)
DIFFERENTIAL TYPE: ABNORMAL
EOSINOPHILS RELATIVE PERCENT: 7 % (ref 1–4)
GFR AFRICAN AMERICAN: >60 ML/MIN
GFR NON-AFRICAN AMERICAN: >60 ML/MIN
GFR SERPL CREATININE-BSD FRML MDRD: NORMAL ML/MIN/{1.73_M2}
GFR SERPL CREATININE-BSD FRML MDRD: NORMAL ML/MIN/{1.73_M2}
GLUCOSE BLD-MCNC: 92 MG/DL (ref 70–99)
HCT VFR BLD CALC: 40.8 % (ref 36–46)
HEMOGLOBIN: 13.7 G/DL (ref 12–16)
IMMATURE GRANULOCYTES: ABNORMAL %
LYMPHOCYTES # BLD: 19 % (ref 24–44)
MCH RBC QN AUTO: 29.9 PG (ref 26–34)
MCHC RBC AUTO-ENTMCNC: 33.5 G/DL (ref 31–37)
MCV RBC AUTO: 89.2 FL (ref 80–100)
MONOCYTES # BLD: 11 % (ref 2–11)
NRBC AUTOMATED: ABNORMAL PER 100 WBC
PDW BLD-RTO: 12.7 % (ref 12.5–15.4)
PLATELET # BLD: 280 K/UL (ref 140–450)
PLATELET ESTIMATE: ABNORMAL
PMV BLD AUTO: 8.8 FL (ref 6–12)
POTASSIUM SERPL-SCNC: 4.3 MMOL/L (ref 3.7–5.3)
RBC # BLD: 4.57 M/UL (ref 4–5.2)
RBC # BLD: ABNORMAL 10*6/UL
SEG NEUTROPHILS: 62 % (ref 36–66)
SEGMENTED NEUTROPHILS ABSOLUTE COUNT: 2.9 K/UL (ref 1.8–7.7)
SODIUM BLD-SCNC: 138 MMOL/L (ref 135–144)
TOTAL PROTEIN: 7.5 G/DL (ref 6.4–8.3)
WBC # BLD: 4.6 K/UL (ref 3.5–11)
WBC # BLD: ABNORMAL 10*3/UL

## 2018-12-04 PROCEDURE — 36415 COLL VENOUS BLD VENIPUNCTURE: CPT

## 2018-12-04 PROCEDURE — 96368 THER/DIAG CONCURRENT INF: CPT

## 2018-12-04 PROCEDURE — 96413 CHEMO IV INFUSION 1 HR: CPT

## 2018-12-04 PROCEDURE — 96366 THER/PROPH/DIAG IV INF ADDON: CPT

## 2018-12-04 PROCEDURE — 96367 TX/PROPH/DG ADDL SEQ IV INF: CPT

## 2018-12-04 PROCEDURE — 99215 OFFICE O/P EST HI 40 MIN: CPT | Performed by: INTERNAL MEDICINE

## 2018-12-04 PROCEDURE — 96416 CHEMO PROLONG INFUSE W/PUMP: CPT

## 2018-12-04 PROCEDURE — 80053 COMPREHEN METABOLIC PANEL: CPT

## 2018-12-04 PROCEDURE — 96375 TX/PRO/DX INJ NEW DRUG ADDON: CPT

## 2018-12-04 PROCEDURE — 96417 CHEMO IV INFUS EACH ADDL SEQ: CPT

## 2018-12-04 PROCEDURE — 6360000002 HC RX W HCPCS: Performed by: INTERNAL MEDICINE

## 2018-12-04 PROCEDURE — 96415 CHEMO IV INFUSION ADDL HR: CPT

## 2018-12-04 PROCEDURE — 85025 COMPLETE CBC W/AUTO DIFF WBC: CPT

## 2018-12-04 PROCEDURE — 2580000003 HC RX 258: Performed by: INTERNAL MEDICINE

## 2018-12-04 PROCEDURE — 36591 DRAW BLOOD OFF VENOUS DEVICE: CPT

## 2018-12-04 RX ORDER — HYDROCODONE BITARTRATE AND ACETAMINOPHEN 5; 325 MG/1; MG/1
1 TABLET ORAL EVERY 6 HOURS PRN
COMMUNITY
End: 2019-01-02 | Stop reason: SDUPTHER

## 2018-12-04 RX ORDER — 0.9 % SODIUM CHLORIDE 0.9 %
10 VIAL (ML) INJECTION ONCE
Status: CANCELLED | OUTPATIENT
Start: 2018-12-04 | End: 2018-12-04

## 2018-12-04 RX ORDER — DEXAMETHASONE SODIUM PHOSPHATE 10 MG/ML
10 INJECTION INTRAMUSCULAR; INTRAVENOUS ONCE
Status: COMPLETED | OUTPATIENT
Start: 2018-12-04 | End: 2018-12-04

## 2018-12-04 RX ORDER — DEXTROSE MONOHYDRATE 50 MG/ML
INJECTION, SOLUTION INTRAVENOUS ONCE
Status: COMPLETED | OUTPATIENT
Start: 2018-12-04 | End: 2018-12-04

## 2018-12-04 RX ORDER — SENNA AND DOCUSATE SODIUM 50; 8.6 MG/1; MG/1
1 TABLET, FILM COATED ORAL DAILY PRN
COMMUNITY
End: 2019-07-18

## 2018-12-04 RX ORDER — HEPARIN SODIUM (PORCINE) LOCK FLUSH IV SOLN 100 UNIT/ML 100 UNIT/ML
500 SOLUTION INTRAVENOUS PRN
Status: DISCONTINUED | OUTPATIENT
Start: 2018-12-04 | End: 2018-12-05 | Stop reason: HOSPADM

## 2018-12-04 RX ORDER — PALONOSETRON 0.05 MG/ML
0.25 INJECTION, SOLUTION INTRAVENOUS ONCE
Status: COMPLETED | OUTPATIENT
Start: 2018-12-04 | End: 2018-12-04

## 2018-12-04 RX ORDER — ATROPINE SULFATE 0.4 MG/ML
0.4 AMPUL (ML) INJECTION
Status: COMPLETED | OUTPATIENT
Start: 2018-12-04 | End: 2018-12-04

## 2018-12-04 RX ORDER — METHYLPREDNISOLONE SODIUM SUCCINATE 125 MG/2ML
125 INJECTION, POWDER, LYOPHILIZED, FOR SOLUTION INTRAMUSCULAR; INTRAVENOUS ONCE
Status: CANCELLED | OUTPATIENT
Start: 2018-12-04 | End: 2018-12-04

## 2018-12-04 RX ORDER — SODIUM CHLORIDE 9 MG/ML
INJECTION, SOLUTION INTRAVENOUS ONCE
Status: CANCELLED | OUTPATIENT
Start: 2018-12-04 | End: 2018-12-04

## 2018-12-04 RX ORDER — SODIUM CHLORIDE 0.9 % (FLUSH) 0.9 %
10 SYRINGE (ML) INJECTION PRN
Status: DISCONTINUED | OUTPATIENT
Start: 2018-12-04 | End: 2018-12-05 | Stop reason: HOSPADM

## 2018-12-04 RX ORDER — SODIUM CHLORIDE 9 MG/ML
INJECTION, SOLUTION INTRAVENOUS CONTINUOUS
Status: CANCELLED | OUTPATIENT
Start: 2018-12-04

## 2018-12-04 RX ORDER — SODIUM CHLORIDE 0.9 % (FLUSH) 0.9 %
5 SYRINGE (ML) INJECTION PRN
Status: CANCELLED | OUTPATIENT
Start: 2018-12-04

## 2018-12-04 RX ORDER — DIPHENHYDRAMINE HYDROCHLORIDE 50 MG/ML
50 INJECTION INTRAMUSCULAR; INTRAVENOUS ONCE
Status: CANCELLED | OUTPATIENT
Start: 2018-12-04 | End: 2018-12-04

## 2018-12-04 RX ADMIN — SODIUM CHLORIDE, PRESERVATIVE FREE 10 ML: 5 INJECTION INTRAVENOUS at 08:15

## 2018-12-04 RX ADMIN — IRINOTECAN HYDROCHLORIDE 160 MG: 40 INJECTION, SOLUTION INTRAVENOUS at 12:38

## 2018-12-04 RX ADMIN — ATROPINE SULFATE 0.4 MG: 0.4 INJECTION, SOLUTION INTRAMUSCULAR; INTRAVENOUS; SUBCUTANEOUS at 10:17

## 2018-12-04 RX ADMIN — SODIUM CHLORIDE, PRESERVATIVE FREE 10 ML: 5 INJECTION INTRAVENOUS at 08:16

## 2018-12-04 RX ADMIN — DEXTROSE MONOHYDRATE: 50 INJECTION, SOLUTION INTRAVENOUS at 09:18

## 2018-12-04 RX ADMIN — OXALIPLATIN 132 MG: 5 INJECTION, SOLUTION, CONCENTRATE INTRAVENOUS at 10:38

## 2018-12-04 RX ADMIN — LEUCOVORIN CALCIUM 600 MG: 200 INJECTION, POWDER, LYOPHILIZED, FOR SOLUTION INTRAMUSCULAR; INTRAVENOUS at 10:37

## 2018-12-04 RX ADMIN — FLUOROURACIL 3750 MG: 50 INJECTION, SOLUTION INTRAVENOUS at 14:36

## 2018-12-04 RX ADMIN — PALONOSETRON 0.25 MG: 0.05 INJECTION, SOLUTION INTRAVENOUS at 09:23

## 2018-12-04 RX ADMIN — DEXAMETHASONE SODIUM PHOSPHATE 10 MG: 10 INJECTION INTRAMUSCULAR; INTRAVENOUS at 09:31

## 2018-12-04 RX ADMIN — SODIUM CHLORIDE 150 MG: 900 INJECTION, SOLUTION INTRAVENOUS at 09:44

## 2018-12-04 RX ADMIN — DEXTROSE MONOHYDRATE: 50 INJECTION, SOLUTION INTRAVENOUS at 09:22

## 2018-12-04 NOTE — PROGRESS NOTES
Pt here for C1D1. Denies any new complaints. Labs drawn from port and results reviewed. Had Dr rebolledo prior to treatment. Pt was treated without incident and d/c'd in stable condition. Gladys Mcnamara will return 12/6 for pump DC.  12/18 for treatment and Dr visit. Reinforced teaching about nausea and care of pump. Pt verbalizes  understanding.

## 2018-12-04 NOTE — TELEPHONE ENCOUNTER
Name: Yayo Tucker  : 1960  MRN: [id number]    Oncology Navigation Follow-Up Note    Contact Type:  Medical Oncology  Notes: Pt @ [facility] for Dr. Leon Johnson f/u & first chemotherapy. Accompanied Dr. Leon Johnson in exam room. Dr. Leon Johnson spoke with pt @ length r/t chemotherapy potential side effects, managing pain, maintaining nutrition, & genetic referral.  Pt & pt's spouse with numerous good questions, all addressed by Dr. Leon Johnson. Instructed pt may contact writer prn. Will continue to follow.

## 2018-12-04 NOTE — PROGRESS NOTES
currently breastfeeding. HEENT: Normocephalic and atraumatic. Pupils are equal, round, reactive to light and accommodation. Extraocular muscles are intact. Neck: Showed no JVD, no carotid bruit . Lungs: Clear to auscultation bilaterally. Heart: Regular without any murmur. Abdomen: Soft, nontender. No hepatosplenomegaly. Surgery for laparoscopy is healing well. Extremities: Lower extremities show no edema, clubbing, or cyanosis. Breasts: Examination not done today. Neuro exam: intact cranial nerves bilaterally no motor or sensory deficit, gait is normal. Lymphatic: no adenopathy appreciated in the supraclavicular, axillary, cervical or inguinal area    REVIEW OF LABORATORY DATA:   Lab Results   Component Value Date    WBC 4.6 12/04/2018    HGB 13.7 12/04/2018    HCT 40.8 12/04/2018    MCV 89.2 12/04/2018     12/04/2018       Chemistry        Component Value Date/Time     12/04/2018 0815    K 4.3 12/04/2018 0815     12/04/2018 0815    CO2 24 12/04/2018 0815    BUN 20 12/04/2018 0815    CREATININE 0.70 12/04/2018 0815        Component Value Date/Time    CALCIUM 9.9 12/04/2018 0815    ALKPHOS 63 12/04/2018 0815    AST 16 12/04/2018 0815    ALT 11 12/04/2018 0815    BILITOT 0.49 12/04/2018 0815      Results for Gabriela Root (MRN [id number]) as of 11/22/2018 10:17   Ref. Range 11/19/2018 08:30   CA 19-9 Latest Ref Range: 0 - 35 U/mL 3686 (H)     PATHOLOGY:     REVIEW OF RADIOLOGICAL RESULTS:       PATHOLOGY:       IMPRESSION:   1. Invasive ductal adenocarcinoma of the pancreas, stage III  2. Uche adjuvant FOLFIRINOX started 12/04/2018    PLAN:   1. We discussed her lab work which shows counts within range. 2. We reviewed in detail treatment plan and dosing schedule as well as potential side effects. 3. We had discussion regarding nutrition and maintaining muscle mass. 4. Plan to monitor weight and muscle mass as well as counts.    5. I discussed possible genetic work up and explained it will not affect treatment decisions and thus not urgent, plan for referral to genetics counselor. 6. Start treatment today as planned. 7. Return on Thursday for toxicity check. After consideration, I decided to cut her irinotecan down to 100 mg/m2.  To see how she does

## 2018-12-04 NOTE — PROGRESS NOTES
Joseph Initial Nutrition Assessment    Judith Warren is a 66 y.o.  female     Reason for Evaluation: Pt requesting RD consult    Subjective/Current Data:  Pt and  present at first chemotherapy appt. Pt with pancreatic ca with fluorouracil, leucovorin, irinotecan, oxaliplatin tx states people have recommended certain nutrition therapies to her and she doesn't know what to do. Pt reports someone advised a strict vegetarian diet and juicing and pt \"just wasn't sure if that was right for her and her family. \" RD spent 45 min discussing nutrition education for oncology and pancreatic ca diet ed specifically. RD encouraged pt to maintain current weight as able in addition to consuming adequate protein intake of at least 70g protein per day. Pt reports she eats a generally balanced diet and has recently been using  to incorporate more fruits and vegetables into her diet in smoothies. Pt states she does take Tumeric supplement but no other nutritional supplement at this time. Pt eating well at this time, denies steatorrhea, light colored stool, any post-prandial GI discomfort. RD encouraged pt to pay attention to stool color, GI symptoms. Pt already consumes a generally low-normal fat diet so hopeful that pt does not experience fat malabsorption given dx.      Past Medical History:  Past Medical History:   Diagnosis Date    Asthma     Congenital heart disease     Gastritis without bleeding 10/18/2018    History of abnormal cervical Pap smear 2000    Pre-Cancerous Cells    Seasonal allergies      Past Surgical History:   Procedure Laterality Date    BREAST BIOPSY  2009    BREAST REDUCTION SURGERY  1982    COLONOSCOPY  2002    ENDOSCOPY, COLON, DIAGNOSTIC      [facility] ENDOSCOPIC ULTRASOUND N/A 11/7/2018    ENDOSCOPIC ULTRASOUND WITH FNA PANCREAS performed by Aris Choe MD at [facility] OR        Anthropometric Measures:  Current Weight:   53.2  Ideal Body Weight: 54.5 kg  Ideal Body Weight %: 98%    Nutritional Requirements:  Estimated Energy Needs:  Weight Used: 53.2kg   Method Used: [address]  Estimated kcal Needs: 8357-0122 kcal per day  Protein Needs:  Weight used: 53.2 kg  1.2-1.5 g/kg = 64-74 g per day    Nutrition-focused Physical Findings   GI symptoms: negative  Skin:  Intact    Nutrition Diagnosis and Goal  Problem:  Increased nutrient needs  Etiology/related to: catabolic illness  Symptoms/Signs/as evidenced by: pancreatic ca undergoing chemotherapy       Goal: Adequate intake to aide in wt maintenaince, signs and symptoms management  Progress towards goal: unchanged  Education Needs: Discussed oncology nutrition therapies at length x 45 min    Malnutrition Assessment  · Pt does not meet AND/ASPEN malnutrition guidelines at this time. Per AND/ASPEN guidelines, will monitor for additional RD, RN and MD documentation re weight status, nutritional intake, fluid accumulation, possible loss of body fat or muscle mass, or possible reduced  strength. NUTRITION RECOMMENDATIONS / MONITORING / EVALUATION  1. Encourage diet high in fruits and vegetables, lean proteins  2. Continue low fat diet as clinically appropriate for pancreatic ca. 3. Will monitor wts, labs, PO intakes, s/s management.     Vijaya Prabhakar RD, LD  Registered Dietitian  [facility]     Office: [phone]  Cell: [phone]

## 2018-12-04 NOTE — LETTER
it does not float, she appears to be absorbing her food well. PAST MEDICAL HISTORY: has a past medical history of Asthma; Congenital heart disease; Gastritis without bleeding; History of abnormal cervical Pap smear; and Seasonal allergies. PAST SURGICAL HISTORY: has a past surgical history that includes Breast reduction surgery (1982); Colonoscopy (2002); Breast biopsy (2009); Endoscopy, colon, diagnostic; and pr gi endoscopic ultrasound (N/A, 11/7/2018). CURRENT MEDICATIONS:  has a current medication list which includes the following prescription(s): sennosides-docusate sodium, hydrocodone-acetaminophen, lidocaine-prilocaine, ondansetron, and lorazepam, and the following Facility-Administered Medications: dextrose, fosaprepitant (EMEND) 150 mg in sodium chloride 0.9 % 150 mL IVPB, palonosetron, dexamethasone, atropine, heparin flush, and sodium chloride flush. ALLERGIES:  has No Known Allergies. FAMILY HISTORY: Mother: breast cancer    SOCIAL HISTORY:  reports that she has never smoked. She has never used smokeless tobacco. She reports that she does not drink alcohol or use drugs. REVIEW OF SYSTEMS:   General: No fever or night sweats. Weight is stable. Insomnia due to discomfort  ENT: No double or blurred vision, no tinnitus or hearing problem, no dysphagia or sore throat   Respiratory: No chest pain, no shortness of breath, no cough or hemoptysis. Cardiovascular: Denies chest pain, PND or orthopnea. No L E swelling or palpitations. Gastrointestinal: No nausea or vomiting, diarrhea. + abdominal pain with constipation. Genitourinary: Denies dysuria, hematuria, frequency, urgency or incontinence. Neurological: Denies headaches, decreased LOC, no sensory or motor focal deficits. Musculoskeletal: No arthralgia no back pain or joint swelling. Skin: There are no rashes or bleeding. Psychiatric:  No anxiety, no depression. Endocrine: No diabetes or thyroid disease. 1. We discussed her lab work which shows counts within range. 2. We reviewed in detail treatment plan and dosing schedule as well as potential side effects. 3. We had discussion regarding nutrition and maintaining muscle mass. 4. Plan to monitor weight and muscle mass as well as counts. 5. I discussed possible genetic work up and explained it will not affect treatment decisions and thus not urgent, plan for referral to genetics counselor. 6. Start treatment today as planned. 7. Return on Thursday for toxicity check. After consideration, I decided to cut her irinotecan down to 100 mg/m2. To see how she does                  If you have questions, please do not hesitate to call me. I look forward to following Ankit along with you.     Sincerely,    Rosalba Stephens MD  Hematology/ Oncology  Cell [phone]

## 2018-12-05 RX ORDER — SODIUM CHLORIDE 0.9 % (FLUSH) 0.9 %
20 SYRINGE (ML) INJECTION PRN
Status: CANCELLED | OUTPATIENT
Start: 2018-12-05

## 2018-12-06 ENCOUNTER — HOSPITAL ENCOUNTER (OUTPATIENT)
Dept: INFUSION THERAPY | Age: 58
Discharge: HOME | End: 2018-12-06
Payer: COMMERCIAL

## 2018-12-06 DIAGNOSIS — C25.1 MALIGNANT NEOPLASM OF BODY OF PANCREAS (HCC): ICD-10-CM

## 2018-12-06 PROCEDURE — 96523 IRRIG DRUG DELIVERY DEVICE: CPT

## 2018-12-06 PROCEDURE — 2580000003 HC RX 258: Performed by: INTERNAL MEDICINE

## 2018-12-06 PROCEDURE — 6360000002 HC RX W HCPCS: Performed by: INTERNAL MEDICINE

## 2018-12-06 RX ORDER — SODIUM CHLORIDE 0.9 % (FLUSH) 0.9 %
20 SYRINGE (ML) INJECTION PRN
Status: CANCELLED | OUTPATIENT
Start: 2018-12-06

## 2018-12-06 RX ORDER — HEPARIN SODIUM (PORCINE) LOCK FLUSH IV SOLN 100 UNIT/ML 100 UNIT/ML
500 SOLUTION INTRAVENOUS PRN
Status: DISCONTINUED | OUTPATIENT
Start: 2018-12-06 | End: 2018-12-07 | Stop reason: HOSPADM

## 2018-12-06 RX ORDER — SODIUM CHLORIDE 0.9 % (FLUSH) 0.9 %
10 SYRINGE (ML) INJECTION PRN
Status: CANCELLED | OUTPATIENT
Start: 2018-12-06

## 2018-12-06 RX ORDER — SODIUM CHLORIDE 0.9 % (FLUSH) 0.9 %
10 SYRINGE (ML) INJECTION PRN
Status: DISCONTINUED | OUTPATIENT
Start: 2018-12-06 | End: 2018-12-07 | Stop reason: HOSPADM

## 2018-12-06 RX ORDER — HEPARIN SODIUM (PORCINE) LOCK FLUSH IV SOLN 100 UNIT/ML 100 UNIT/ML
500 SOLUTION INTRAVENOUS PRN
Status: CANCELLED | OUTPATIENT
Start: 2018-12-06

## 2018-12-06 RX ORDER — PROCHLORPERAZINE MALEATE 10 MG
10 TABLET ORAL EVERY 6 HOURS PRN
Qty: 120 TABLET | Refills: 3 | Status: ON HOLD | OUTPATIENT
Start: 2018-12-06 | End: 2019-01-16 | Stop reason: HOSPADM

## 2018-12-06 RX ADMIN — Medication 10 ML: at 15:14

## 2018-12-06 RX ADMIN — SODIUM CHLORIDE, PRESERVATIVE FREE 500 UNITS: 5 INJECTION INTRAVENOUS at 15:14

## 2018-12-06 NOTE — TELEPHONE ENCOUNTER
Pt called c/o of nausea for the past 2 days. She states zofran did not help. She has been able to eat, but not much. Sent rx for compazine for pt; advised for pt to use compazine and zofran alternating every 4 hours. Advised for pt to just use compazine every 6 hours first two days after tx, then on the day she gets her pump removed, to alternate the two. Pt verbalized understanding and rx sent.

## 2018-12-07 ENCOUNTER — TELEPHONE (OUTPATIENT)
Dept: ONCOLOGY | Age: 58
End: 2018-12-07

## 2018-12-10 ENCOUNTER — TELEPHONE (OUTPATIENT)
Dept: ONCOLOGY | Age: 58
End: 2018-12-10

## 2018-12-18 ENCOUNTER — TELEPHONE (OUTPATIENT)
Dept: ONCOLOGY | Age: 58
End: 2018-12-18

## 2018-12-18 ENCOUNTER — OFFICE VISIT (OUTPATIENT)
Dept: ONCOLOGY | Age: 58
End: 2018-12-18
Payer: COMMERCIAL

## 2018-12-18 ENCOUNTER — HOSPITAL ENCOUNTER (OUTPATIENT)
Dept: INFUSION THERAPY | Age: 58
Discharge: HOME | End: 2018-12-18
Payer: COMMERCIAL

## 2018-12-18 VITALS
RESPIRATION RATE: 16 BRPM | WEIGHT: 115.2 LBS | TEMPERATURE: 97.3 F | BODY MASS INDEX: 19.77 KG/M2 | SYSTOLIC BLOOD PRESSURE: 157 MMHG | DIASTOLIC BLOOD PRESSURE: 95 MMHG | HEART RATE: 92 BPM

## 2018-12-18 VITALS
WEIGHT: 115.2 LBS | DIASTOLIC BLOOD PRESSURE: 95 MMHG | TEMPERATURE: 97.3 F | HEART RATE: 92 BPM | SYSTOLIC BLOOD PRESSURE: 157 MMHG | BODY MASS INDEX: 19.77 KG/M2 | RESPIRATION RATE: 16 BRPM

## 2018-12-18 DIAGNOSIS — R10.13 EPIGASTRIC PAIN: ICD-10-CM

## 2018-12-18 DIAGNOSIS — C25.1 MALIGNANT NEOPLASM OF BODY OF PANCREAS (HCC): ICD-10-CM

## 2018-12-18 DIAGNOSIS — R11.2 CINV (CHEMOTHERAPY-INDUCED NAUSEA AND VOMITING): ICD-10-CM

## 2018-12-18 DIAGNOSIS — C25.1 MALIGNANT NEOPLASM OF BODY OF PANCREAS (HCC): Primary | ICD-10-CM

## 2018-12-18 DIAGNOSIS — T45.1X5A CINV (CHEMOTHERAPY-INDUCED NAUSEA AND VOMITING): ICD-10-CM

## 2018-12-18 LAB
ABSOLUTE EOS #: 0.2 K/UL (ref 0–0.4)
ABSOLUTE IMMATURE GRANULOCYTE: ABNORMAL K/UL (ref 0–0.3)
ABSOLUTE LYMPH #: 1.2 K/UL (ref 1–4.8)
ABSOLUTE MONO #: 0.4 K/UL (ref 0.1–1.2)
ALBUMIN SERPL-MCNC: 4.3 G/DL (ref 3.5–5.2)
ALBUMIN/GLOBULIN RATIO: 1.6 (ref 1–2.5)
ALP BLD-CCNC: 60 U/L (ref 35–104)
ALT SERPL-CCNC: 9 U/L (ref 5–33)
ANION GAP SERPL CALCULATED.3IONS-SCNC: 13 MMOL/L (ref 9–17)
AST SERPL-CCNC: 16 U/L
BASOPHILS # BLD: 1 % (ref 0–2)
BASOPHILS ABSOLUTE: 0 K/UL (ref 0–0.2)
BILIRUB SERPL-MCNC: 0.21 MG/DL (ref 0.3–1.2)
BUN BLDV-MCNC: 14 MG/DL (ref 6–20)
BUN/CREAT BLD: ABNORMAL (ref 9–20)
CALCIUM SERPL-MCNC: 9.5 MG/DL (ref 8.6–10.4)
CHLORIDE BLD-SCNC: 102 MMOL/L (ref 98–107)
CO2: 27 MMOL/L (ref 20–31)
CREAT SERPL-MCNC: 0.6 MG/DL (ref 0.5–0.9)
DIFFERENTIAL TYPE: ABNORMAL
EOSINOPHILS RELATIVE PERCENT: 7 % (ref 1–4)
GFR AFRICAN AMERICAN: >60 ML/MIN
GFR NON-AFRICAN AMERICAN: >60 ML/MIN
GFR SERPL CREATININE-BSD FRML MDRD: ABNORMAL ML/MIN/{1.73_M2}
GFR SERPL CREATININE-BSD FRML MDRD: ABNORMAL ML/MIN/{1.73_M2}
GLUCOSE BLD-MCNC: 86 MG/DL (ref 70–99)
HCT VFR BLD CALC: 36.4 % (ref 36–46)
HEMOGLOBIN: 12.2 G/DL (ref 12–16)
IMMATURE GRANULOCYTES: ABNORMAL %
LYMPHOCYTES # BLD: 41 % (ref 24–44)
MCH RBC QN AUTO: 29.7 PG (ref 26–34)
MCHC RBC AUTO-ENTMCNC: 33.5 G/DL (ref 31–37)
MCV RBC AUTO: 88.6 FL (ref 80–100)
MONOCYTES # BLD: 13 % (ref 2–11)
NRBC AUTOMATED: ABNORMAL PER 100 WBC
PDW BLD-RTO: 12.8 % (ref 12.5–15.4)
PLATELET # BLD: 205 K/UL (ref 140–450)
PLATELET ESTIMATE: ABNORMAL
PMV BLD AUTO: 8.2 FL (ref 6–12)
POTASSIUM SERPL-SCNC: 4 MMOL/L (ref 3.7–5.3)
RBC # BLD: 4.11 M/UL (ref 4–5.2)
RBC # BLD: ABNORMAL 10*6/UL
SEG NEUTROPHILS: 38 % (ref 36–66)
SEGMENTED NEUTROPHILS ABSOLUTE COUNT: 1.2 K/UL (ref 1.8–7.7)
SODIUM BLD-SCNC: 142 MMOL/L (ref 135–144)
TOTAL PROTEIN: 7 G/DL (ref 6.4–8.3)
WBC # BLD: 3.1 K/UL (ref 3.5–11)
WBC # BLD: ABNORMAL 10*3/UL

## 2018-12-18 PROCEDURE — 85025 COMPLETE CBC W/AUTO DIFF WBC: CPT

## 2018-12-18 PROCEDURE — 6360000002 HC RX W HCPCS: Performed by: INTERNAL MEDICINE

## 2018-12-18 PROCEDURE — 2580000003 HC RX 258: Performed by: INTERNAL MEDICINE

## 2018-12-18 PROCEDURE — 96367 TX/PROPH/DG ADDL SEQ IV INF: CPT

## 2018-12-18 PROCEDURE — 96416 CHEMO PROLONG INFUSE W/PUMP: CPT

## 2018-12-18 PROCEDURE — 96413 CHEMO IV INFUSION 1 HR: CPT

## 2018-12-18 PROCEDURE — 36591 DRAW BLOOD OFF VENOUS DEVICE: CPT

## 2018-12-18 PROCEDURE — 96415 CHEMO IV INFUSION ADDL HR: CPT

## 2018-12-18 PROCEDURE — 96368 THER/DIAG CONCURRENT INF: CPT

## 2018-12-18 PROCEDURE — 99214 OFFICE O/P EST MOD 30 MIN: CPT | Performed by: INTERNAL MEDICINE

## 2018-12-18 PROCEDURE — 96417 CHEMO IV INFUS EACH ADDL SEQ: CPT

## 2018-12-18 PROCEDURE — 80053 COMPREHEN METABOLIC PANEL: CPT

## 2018-12-18 PROCEDURE — 96375 TX/PRO/DX INJ NEW DRUG ADDON: CPT

## 2018-12-18 RX ORDER — DIPHENHYDRAMINE HYDROCHLORIDE 50 MG/ML
50 INJECTION INTRAMUSCULAR; INTRAVENOUS ONCE
Status: CANCELLED | OUTPATIENT
Start: 2019-01-02 | End: 2019-01-02

## 2018-12-18 RX ORDER — ATROPINE SULFATE 0.4 MG/ML
0.4 AMPUL (ML) INJECTION
Status: COMPLETED | OUTPATIENT
Start: 2018-12-18 | End: 2018-12-18

## 2018-12-18 RX ORDER — SODIUM CHLORIDE 0.9 % (FLUSH) 0.9 %
5 SYRINGE (ML) INJECTION PRN
Status: CANCELLED | OUTPATIENT
Start: 2019-01-02

## 2018-12-18 RX ORDER — METHYLPREDNISOLONE SODIUM SUCCINATE 125 MG/2ML
125 INJECTION, POWDER, LYOPHILIZED, FOR SOLUTION INTRAMUSCULAR; INTRAVENOUS ONCE
Status: CANCELLED | OUTPATIENT
Start: 2018-12-18 | End: 2018-12-18

## 2018-12-18 RX ORDER — SODIUM CHLORIDE 0.9 % (FLUSH) 0.9 %
10 SYRINGE (ML) INJECTION PRN
Status: CANCELLED | OUTPATIENT
Start: 2019-01-02

## 2018-12-18 RX ORDER — DEXTROSE MONOHYDRATE 50 MG/ML
INJECTION, SOLUTION INTRAVENOUS ONCE
Status: COMPLETED | OUTPATIENT
Start: 2018-12-18 | End: 2018-12-18

## 2018-12-18 RX ORDER — HEPARIN SODIUM (PORCINE) LOCK FLUSH IV SOLN 100 UNIT/ML 100 UNIT/ML
500 SOLUTION INTRAVENOUS PRN
Status: DISCONTINUED | OUTPATIENT
Start: 2018-12-18 | End: 2018-12-19 | Stop reason: HOSPADM

## 2018-12-18 RX ORDER — PALONOSETRON 0.05 MG/ML
0.25 INJECTION, SOLUTION INTRAVENOUS ONCE
Status: CANCELLED | OUTPATIENT
Start: 2019-01-02

## 2018-12-18 RX ORDER — SODIUM CHLORIDE 9 MG/ML
INJECTION, SOLUTION INTRAVENOUS CONTINUOUS
Status: CANCELLED | OUTPATIENT
Start: 2019-01-02

## 2018-12-18 RX ORDER — SODIUM CHLORIDE 0.9 % (FLUSH) 0.9 %
10 SYRINGE (ML) INJECTION PRN
Status: DISCONTINUED | OUTPATIENT
Start: 2018-12-18 | End: 2018-12-19 | Stop reason: HOSPADM

## 2018-12-18 RX ORDER — HEPARIN SODIUM (PORCINE) LOCK FLUSH IV SOLN 100 UNIT/ML 100 UNIT/ML
500 SOLUTION INTRAVENOUS PRN
Status: CANCELLED | OUTPATIENT
Start: 2019-01-02

## 2018-12-18 RX ORDER — SODIUM CHLORIDE 9 MG/ML
INJECTION, SOLUTION INTRAVENOUS ONCE
Status: CANCELLED | OUTPATIENT
Start: 2019-01-02 | End: 2019-01-02

## 2018-12-18 RX ORDER — SODIUM CHLORIDE 9 MG/ML
INJECTION, SOLUTION INTRAVENOUS CONTINUOUS
Status: CANCELLED | OUTPATIENT
Start: 2018-12-18

## 2018-12-18 RX ORDER — 0.9 % SODIUM CHLORIDE 0.9 %
10 VIAL (ML) INJECTION ONCE
Status: CANCELLED | OUTPATIENT
Start: 2018-12-18 | End: 2018-12-18

## 2018-12-18 RX ORDER — DIPHENHYDRAMINE HYDROCHLORIDE 50 MG/ML
50 INJECTION INTRAMUSCULAR; INTRAVENOUS ONCE
Status: CANCELLED | OUTPATIENT
Start: 2018-12-18 | End: 2018-12-18

## 2018-12-18 RX ORDER — DEXAMETHASONE SODIUM PHOSPHATE 10 MG/ML
10 INJECTION, SOLUTION INTRAMUSCULAR; INTRAVENOUS ONCE
Status: COMPLETED | OUTPATIENT
Start: 2018-12-18 | End: 2018-12-18

## 2018-12-18 RX ORDER — SODIUM CHLORIDE 0.9 % (FLUSH) 0.9 %
5 SYRINGE (ML) INJECTION PRN
Status: CANCELLED | OUTPATIENT
Start: 2018-12-18

## 2018-12-18 RX ORDER — OLANZAPINE 2.5 MG/1
2.5 TABLET ORAL NIGHTLY
Qty: 30 TABLET | Refills: 3 | Status: SHIPPED | OUTPATIENT
Start: 2018-12-18 | End: 2019-02-12 | Stop reason: ALTCHOICE

## 2018-12-18 RX ORDER — PALONOSETRON 0.05 MG/ML
0.25 INJECTION, SOLUTION INTRAVENOUS ONCE
Status: COMPLETED | OUTPATIENT
Start: 2018-12-18 | End: 2018-12-18

## 2018-12-18 RX ORDER — ATROPINE SULFATE 0.4 MG/ML
0.4 AMPUL (ML) INJECTION
Status: CANCELLED | OUTPATIENT
Start: 2019-01-02

## 2018-12-18 RX ORDER — DEXTROSE MONOHYDRATE 50 MG/ML
INJECTION, SOLUTION INTRAVENOUS ONCE
Status: CANCELLED | OUTPATIENT
Start: 2019-01-02 | End: 2019-01-02

## 2018-12-18 RX ORDER — 0.9 % SODIUM CHLORIDE 0.9 %
10 VIAL (ML) INJECTION ONCE
Status: CANCELLED | OUTPATIENT
Start: 2019-01-02 | End: 2019-01-02

## 2018-12-18 RX ORDER — SODIUM CHLORIDE 9 MG/ML
INJECTION, SOLUTION INTRAVENOUS ONCE
Status: CANCELLED | OUTPATIENT
Start: 2018-12-18 | End: 2018-12-18

## 2018-12-18 RX ORDER — METHYLPREDNISOLONE SODIUM SUCCINATE 125 MG/2ML
125 INJECTION, POWDER, LYOPHILIZED, FOR SOLUTION INTRAMUSCULAR; INTRAVENOUS ONCE
Status: CANCELLED | OUTPATIENT
Start: 2019-01-02 | End: 2019-01-02

## 2018-12-18 RX ADMIN — Medication 10 ML: at 08:11

## 2018-12-18 RX ADMIN — DEXTROSE MONOHYDRATE: 50 INJECTION, SOLUTION INTRAVENOUS at 08:53

## 2018-12-18 RX ADMIN — Medication 10 ML: at 13:46

## 2018-12-18 RX ADMIN — PALONOSETRON 0.25 MG: 0.05 INJECTION, SOLUTION INTRAVENOUS at 08:54

## 2018-12-18 RX ADMIN — OXALIPLATIN 132 MG: 5 INJECTION, SOLUTION, CONCENTRATE INTRAVENOUS at 11:32

## 2018-12-18 RX ADMIN — SODIUM CHLORIDE 150 MG: 900 INJECTION, SOLUTION INTRAVENOUS at 09:10

## 2018-12-18 RX ADMIN — Medication 10 ML: at 08:12

## 2018-12-18 RX ADMIN — DEXAMETHASONE SODIUM PHOSPHATE 10 MG: 10 INJECTION INTRAMUSCULAR; INTRAVENOUS at 08:55

## 2018-12-18 RX ADMIN — DEXTROSE MONOHYDRATE 200 MG: 50 INJECTION, SOLUTION INTRAVENOUS at 09:54

## 2018-12-18 RX ADMIN — ATROPINE SULFATE 0.4 MG: 0.4 INJECTION, SOLUTION INTRAMUSCULAR; INTRAVENOUS; SUBCUTANEOUS at 09:54

## 2018-12-18 RX ADMIN — FLUOROURACIL 3750 MG: 50 INJECTION, SOLUTION INTRAVENOUS at 13:46

## 2018-12-18 RX ADMIN — LEUCOVORIN CALCIUM 600 MG: 200 INJECTION, POWDER, LYOPHILIZED, FOR SOLUTION INTRAMUSCULAR; INTRAVENOUS at 09:55

## 2018-12-18 ASSESSMENT — PAIN DESCRIPTION - ORIENTATION: ORIENTATION: LEFT;UPPER

## 2018-12-18 ASSESSMENT — PAIN SCALES - GENERAL: PAINLEVEL_OUTOF10: 5

## 2018-12-18 ASSESSMENT — PAIN DESCRIPTION - DESCRIPTORS: DESCRIPTORS: CONSTANT;ACHING

## 2018-12-18 ASSESSMENT — PAIN DESCRIPTION - PAIN TYPE: TYPE: ACUTE PAIN

## 2018-12-18 ASSESSMENT — PAIN DESCRIPTION - LOCATION: LOCATION: ABDOMEN

## 2018-12-18 NOTE — PROGRESS NOTES
Pt here for C2D1 Irinotecan, Oxaliplatin, Leucovorin, 5Fu. Pt states she had significant nausea and loss of appetite for several days following first treatment. Also states that nausea was controlled well with alternating Compazine and Zofran ATC. Also states she had significant constipation when taking Norco for abdominal pain. Labs drawn from port and results reviewed, Dr Tiffanie Guzmán notified of [facility] 1.20 and ordered to proceed with treatment as scheduled and add Neulasta when pump off. Dr Tiffanie Guzmán saw pt at chair side for follow up, refer to his note. Pt was treated without incident and d/c'd in stable condition. Pt will return in 2 days for pump dc and Neulasta OBI.

## 2018-12-18 NOTE — PROGRESS NOTES
NUTRITION NOTE    Followed up with pt and  during chemotherapy. Pt states first week after her first chemotherapy, poor PO intakes, nausea, constipation were an issue. Pt states after first week, symptoms improved immensely and felt better and ate better and has been doing okay. Pt stated that Dr changed \"some meds around\" so hopefully this treatment cycle there will be better symptoms management. Pt in good spirits.   Will continue to follow  Diego Lundberg RD, LD  Registered Dietitian  [facility]     Office: [phone]  Cell: [phone]

## 2018-12-18 NOTE — LETTER
REVIEW OF RADIOLOGICAL RESULTS:       PATHOLOGY:       IMPRESSION:   1. Invasive ductal adenocarcinoma of the pancreas, stage III  2. Uche adjuvant FOLFIRINOX started 12/04/2018  3. Chemo therapy induced nausea  4. Uncontrolled pain  5. Opioid induced constipation   6. Relative leukopenia due to chemo     PLAN:   1. We reviewed her lab work, she is neutropenic, plan to dose adjust for treatment today, all other counts are stable. 2. I completed toxicity check - grade 2 nausea, opoid induced constipation, neutropenia. 3. We discussed celiac block to address pain, in the interim plan for continued pain regimen and I am adding Movantik to address constipation - to be used with Senakot and Miralax. We discussed dosing of the three lines. 4. We also discussed managing nausea, I am writing for Zyprexa to be taken nightly, continue with Zofran and Compazine as needed. 5. I discussed dosing schedule with cycle #3. 6. I am referring to pain management  for celiac block. 7. Plan for Neulasta on Thursday. 8. Return with cycle #4. If you have questions, please do not hesitate to call me. I look forward to following Ankit along with you.     Sincerely,    Mheul Zapien MD  Hematology/ Oncology  Cell [phone]

## 2018-12-20 ENCOUNTER — HOSPITAL ENCOUNTER (OUTPATIENT)
Dept: INFUSION THERAPY | Age: 58
Discharge: HOME | End: 2018-12-20
Payer: COMMERCIAL

## 2018-12-20 DIAGNOSIS — C25.1 MALIGNANT NEOPLASM OF BODY OF PANCREAS (HCC): ICD-10-CM

## 2018-12-20 PROCEDURE — 2580000003 HC RX 258: Performed by: INTERNAL MEDICINE

## 2018-12-20 PROCEDURE — 96372 THER/PROPH/DIAG INJ SC/IM: CPT

## 2018-12-20 PROCEDURE — 96523 IRRIG DRUG DELIVERY DEVICE: CPT

## 2018-12-20 PROCEDURE — 6360000002 HC RX W HCPCS: Performed by: INTERNAL MEDICINE

## 2018-12-20 RX ORDER — SODIUM CHLORIDE 0.9 % (FLUSH) 0.9 %
10 SYRINGE (ML) INJECTION PRN
Status: DISCONTINUED | OUTPATIENT
Start: 2018-12-20 | End: 2018-12-21 | Stop reason: HOSPADM

## 2018-12-20 RX ORDER — HEPARIN SODIUM (PORCINE) LOCK FLUSH IV SOLN 100 UNIT/ML 100 UNIT/ML
500 SOLUTION INTRAVENOUS PRN
Status: DISCONTINUED | OUTPATIENT
Start: 2018-12-20 | End: 2018-12-21 | Stop reason: HOSPADM

## 2018-12-20 RX ORDER — SODIUM CHLORIDE 0.9 % (FLUSH) 0.9 %
10 SYRINGE (ML) INJECTION PRN
Status: CANCELLED | OUTPATIENT
Start: 2018-12-20

## 2018-12-20 RX ORDER — SODIUM CHLORIDE 0.9 % (FLUSH) 0.9 %
20 SYRINGE (ML) INJECTION PRN
Status: CANCELLED | OUTPATIENT
Start: 2018-12-20

## 2018-12-20 RX ORDER — HEPARIN SODIUM (PORCINE) LOCK FLUSH IV SOLN 100 UNIT/ML 100 UNIT/ML
500 SOLUTION INTRAVENOUS PRN
Status: CANCELLED | OUTPATIENT
Start: 2018-12-20

## 2018-12-20 RX ADMIN — PEGFILGRASTIM 6 MG: 6 INJECTION SUBCUTANEOUS at 13:12

## 2018-12-20 RX ADMIN — SODIUM CHLORIDE, PRESERVATIVE FREE 500 UNITS: 5 INJECTION INTRAVENOUS at 13:05

## 2018-12-20 RX ADMIN — Medication 10 ML: at 13:05

## 2018-12-20 NOTE — PROGRESS NOTES
Pt is here for CADD pump d/c and nuelasta. Denies any problems at this time. Pt was discharged in stable condition and will return on 1/2 for C3D1.

## 2019-01-02 ENCOUNTER — TELEPHONE (OUTPATIENT)
Dept: ONCOLOGY | Age: 59
End: 2019-01-02

## 2019-01-02 ENCOUNTER — HOSPITAL ENCOUNTER (OUTPATIENT)
Dept: INFUSION THERAPY | Age: 59
Discharge: HOME | End: 2019-01-02
Payer: COMMERCIAL

## 2019-01-02 VITALS
SYSTOLIC BLOOD PRESSURE: 124 MMHG | HEART RATE: 77 BPM | WEIGHT: 112 LBS | DIASTOLIC BLOOD PRESSURE: 92 MMHG | RESPIRATION RATE: 16 BRPM | TEMPERATURE: 98 F | BODY MASS INDEX: 19.22 KG/M2

## 2019-01-02 DIAGNOSIS — C25.1 MALIGNANT NEOPLASM OF BODY OF PANCREAS (HCC): ICD-10-CM

## 2019-01-02 DIAGNOSIS — C25.1 MALIGNANT NEOPLASM OF BODY OF PANCREAS (HCC): Primary | ICD-10-CM

## 2019-01-02 LAB
ABSOLUTE EOS #: 0.1 K/UL (ref 0–0.4)
ABSOLUTE IMMATURE GRANULOCYTE: ABNORMAL K/UL (ref 0–0.3)
ABSOLUTE LYMPH #: 1.3 K/UL (ref 1–4.8)
ABSOLUTE MONO #: 0.6 K/UL (ref 0.1–1.2)
ALBUMIN SERPL-MCNC: 4.3 G/DL (ref 3.5–5.2)
ALBUMIN/GLOBULIN RATIO: 1.7 (ref 1–2.5)
ALP BLD-CCNC: 85 U/L (ref 35–104)
ALT SERPL-CCNC: 15 U/L (ref 5–33)
ANION GAP SERPL CALCULATED.3IONS-SCNC: 14 MMOL/L (ref 9–17)
AST SERPL-CCNC: 19 U/L
BASOPHILS # BLD: 0 % (ref 0–2)
BASOPHILS ABSOLUTE: 0 K/UL (ref 0–0.2)
BILIRUB SERPL-MCNC: 0.21 MG/DL (ref 0.3–1.2)
BUN BLDV-MCNC: 11 MG/DL (ref 6–20)
BUN/CREAT BLD: ABNORMAL (ref 9–20)
CA 19-9: 2620 U/ML (ref 0–35)
CALCIUM SERPL-MCNC: 9.6 MG/DL (ref 8.6–10.4)
CHLORIDE BLD-SCNC: 103 MMOL/L (ref 98–107)
CO2: 22 MMOL/L (ref 20–31)
CREAT SERPL-MCNC: 0.66 MG/DL (ref 0.5–0.9)
DIFFERENTIAL TYPE: ABNORMAL
EOSINOPHILS RELATIVE PERCENT: 2 % (ref 1–4)
GFR AFRICAN AMERICAN: >60 ML/MIN
GFR NON-AFRICAN AMERICAN: >60 ML/MIN
GFR SERPL CREATININE-BSD FRML MDRD: ABNORMAL ML/MIN/{1.73_M2}
GFR SERPL CREATININE-BSD FRML MDRD: ABNORMAL ML/MIN/{1.73_M2}
GLUCOSE BLD-MCNC: 84 MG/DL (ref 70–99)
HCT VFR BLD CALC: 37.7 % (ref 36–46)
HEMOGLOBIN: 12.5 G/DL (ref 12–16)
IMMATURE GRANULOCYTES: ABNORMAL %
LYMPHOCYTES # BLD: 18 % (ref 24–44)
MCH RBC QN AUTO: 29.9 PG (ref 26–34)
MCHC RBC AUTO-ENTMCNC: 33.3 G/DL (ref 31–37)
MCV RBC AUTO: 89.7 FL (ref 80–100)
MONOCYTES # BLD: 8 % (ref 2–11)
NRBC AUTOMATED: ABNORMAL PER 100 WBC
PDW BLD-RTO: 13.5 % (ref 12.5–15.4)
PLATELET # BLD: 132 K/UL (ref 140–450)
PLATELET ESTIMATE: ABNORMAL
PMV BLD AUTO: 8.4 FL (ref 6–12)
POTASSIUM SERPL-SCNC: 4.3 MMOL/L (ref 3.7–5.3)
RBC # BLD: 4.2 M/UL (ref 4–5.2)
RBC # BLD: ABNORMAL 10*6/UL
SEG NEUTROPHILS: 72 % (ref 36–66)
SEGMENTED NEUTROPHILS ABSOLUTE COUNT: 5.4 K/UL (ref 1.8–7.7)
SODIUM BLD-SCNC: 139 MMOL/L (ref 135–144)
TOTAL PROTEIN: 6.9 G/DL (ref 6.4–8.3)
WBC # BLD: 7.4 K/UL (ref 3.5–11)
WBC # BLD: ABNORMAL 10*3/UL

## 2019-01-02 PROCEDURE — 96413 CHEMO IV INFUSION 1 HR: CPT | Performed by: NURSE PRACTITIONER

## 2019-01-02 PROCEDURE — 36591 DRAW BLOOD OFF VENOUS DEVICE: CPT | Performed by: NURSE PRACTITIONER

## 2019-01-02 PROCEDURE — 86301 IMMUNOASSAY TUMOR CA 19-9: CPT

## 2019-01-02 PROCEDURE — 6360000002 HC RX W HCPCS: Performed by: INTERNAL MEDICINE

## 2019-01-02 PROCEDURE — 96368 THER/DIAG CONCURRENT INF: CPT | Performed by: NURSE PRACTITIONER

## 2019-01-02 PROCEDURE — 96375 TX/PRO/DX INJ NEW DRUG ADDON: CPT | Performed by: NURSE PRACTITIONER

## 2019-01-02 PROCEDURE — 96415 CHEMO IV INFUSION ADDL HR: CPT | Performed by: NURSE PRACTITIONER

## 2019-01-02 PROCEDURE — 2580000003 HC RX 258: Performed by: INTERNAL MEDICINE

## 2019-01-02 PROCEDURE — 96367 TX/PROPH/DG ADDL SEQ IV INF: CPT

## 2019-01-02 PROCEDURE — 96416 CHEMO PROLONG INFUSE W/PUMP: CPT | Performed by: NURSE PRACTITIONER

## 2019-01-02 PROCEDURE — 96366 THER/PROPH/DIAG IV INF ADDON: CPT | Performed by: NURSE PRACTITIONER

## 2019-01-02 PROCEDURE — 85025 COMPLETE CBC W/AUTO DIFF WBC: CPT

## 2019-01-02 PROCEDURE — 80053 COMPREHEN METABOLIC PANEL: CPT

## 2019-01-02 PROCEDURE — 96417 CHEMO IV INFUS EACH ADDL SEQ: CPT | Performed by: NURSE PRACTITIONER

## 2019-01-02 RX ORDER — SODIUM CHLORIDE 0.9 % (FLUSH) 0.9 %
10 SYRINGE (ML) INJECTION PRN
Status: DISCONTINUED | OUTPATIENT
Start: 2019-01-02 | End: 2019-01-03 | Stop reason: HOSPADM

## 2019-01-02 RX ORDER — DEXTROSE MONOHYDRATE 50 MG/ML
INJECTION, SOLUTION INTRAVENOUS ONCE
Status: COMPLETED | OUTPATIENT
Start: 2019-01-02 | End: 2019-01-02

## 2019-01-02 RX ORDER — DEXAMETHASONE SODIUM PHOSPHATE 10 MG/ML
10 INJECTION INTRAMUSCULAR; INTRAVENOUS ONCE
Status: COMPLETED | OUTPATIENT
Start: 2019-01-02 | End: 2019-01-02

## 2019-01-02 RX ORDER — PALONOSETRON 0.05 MG/ML
0.25 INJECTION, SOLUTION INTRAVENOUS ONCE
Status: COMPLETED | OUTPATIENT
Start: 2019-01-02 | End: 2019-01-02

## 2019-01-02 RX ORDER — HYDROCODONE BITARTRATE AND ACETAMINOPHEN 5; 325 MG/1; MG/1
1 TABLET ORAL EVERY 6 HOURS PRN
Qty: 120 TABLET | Refills: 0 | Status: ON HOLD | OUTPATIENT
Start: 2019-01-02 | End: 2019-01-16 | Stop reason: HOSPADM

## 2019-01-02 RX ORDER — ATROPINE SULFATE 0.4 MG/ML
0.4 AMPUL (ML) INJECTION
Status: COMPLETED | OUTPATIENT
Start: 2019-01-02 | End: 2019-01-02

## 2019-01-02 RX ADMIN — OXALIPLATIN 132 MG: 5 INJECTION, SOLUTION, CONCENTRATE INTRAVENOUS at 13:35

## 2019-01-02 RX ADMIN — Medication 10 ML: at 10:14

## 2019-01-02 RX ADMIN — FLUOROURACIL 3750 MG: 50 INJECTION, SOLUTION INTRAVENOUS at 15:36

## 2019-01-02 RX ADMIN — DEXTROSE MONOHYDRATE 200 MG: 50 INJECTION, SOLUTION INTRAVENOUS at 12:02

## 2019-01-02 RX ADMIN — Medication 10 ML: at 10:16

## 2019-01-02 RX ADMIN — DEXAMETHASONE SODIUM PHOSPHATE 10 MG: 10 INJECTION INTRAMUSCULAR; INTRAVENOUS at 11:08

## 2019-01-02 RX ADMIN — SODIUM CHLORIDE 150 MG: 900 INJECTION, SOLUTION INTRAVENOUS at 11:12

## 2019-01-02 RX ADMIN — LEUCOVORIN CALCIUM 600 MG: 200 INJECTION, POWDER, LYOPHILIZED, FOR SOLUTION INTRAMUSCULAR; INTRAVENOUS at 12:02

## 2019-01-02 RX ADMIN — ATROPINE SULFATE 0.4 MG: 0.4 INJECTION, SOLUTION INTRAMUSCULAR; INTRAVENOUS; SUBCUTANEOUS at 11:58

## 2019-01-02 RX ADMIN — DEXTROSE MONOHYDRATE: 50 INJECTION, SOLUTION INTRAVENOUS at 11:05

## 2019-01-02 RX ADMIN — PALONOSETRON 0.25 MG: 0.05 INJECTION, SOLUTION INTRAVENOUS at 11:07

## 2019-01-02 NOTE — PROGRESS NOTES
Patient tolerated c3d1 well. She had a little nausea after eating a large chicken sandwich, and after her LV and Camptosar. I offered something more for the nausea, patient refused. afew mins. Later she stated it was better. Then when I applied the pump she stated her toes were cramping. She stated it usually doesn't happen till the ride home. When she gets home and puts on her comfy clothes and socks it goes away. I believe this is from the oxaliplatin. She verbalized understanding. Patient stable and ambulatory at d/c'd.

## 2019-01-03 ENCOUNTER — TELEPHONE (OUTPATIENT)
Dept: ONCOLOGY | Age: 59
End: 2019-01-03

## 2019-01-04 ENCOUNTER — HOSPITAL ENCOUNTER (OUTPATIENT)
Dept: INFUSION THERAPY | Age: 59
Discharge: HOME | End: 2019-01-04
Payer: COMMERCIAL

## 2019-01-04 DIAGNOSIS — C25.1 MALIGNANT NEOPLASM OF BODY OF PANCREAS (HCC): ICD-10-CM

## 2019-01-04 PROCEDURE — 6360000002 HC RX W HCPCS: Performed by: INTERNAL MEDICINE

## 2019-01-04 PROCEDURE — 96523 IRRIG DRUG DELIVERY DEVICE: CPT

## 2019-01-04 PROCEDURE — 96377 APPLICATON ON-BODY INJECTOR: CPT

## 2019-01-04 PROCEDURE — 2580000003 HC RX 258: Performed by: INTERNAL MEDICINE

## 2019-01-04 RX ORDER — SODIUM CHLORIDE 0.9 % (FLUSH) 0.9 %
20 SYRINGE (ML) INJECTION PRN
Status: CANCELLED | OUTPATIENT
Start: 2019-01-04

## 2019-01-04 RX ORDER — HEPARIN SODIUM (PORCINE) LOCK FLUSH IV SOLN 100 UNIT/ML 100 UNIT/ML
500 SOLUTION INTRAVENOUS PRN
Status: CANCELLED | OUTPATIENT
Start: 2019-01-04

## 2019-01-04 RX ORDER — HEPARIN SODIUM (PORCINE) LOCK FLUSH IV SOLN 100 UNIT/ML 100 UNIT/ML
500 SOLUTION INTRAVENOUS PRN
Status: DISCONTINUED | OUTPATIENT
Start: 2019-01-04 | End: 2019-01-05 | Stop reason: HOSPADM

## 2019-01-04 RX ORDER — SODIUM CHLORIDE 0.9 % (FLUSH) 0.9 %
10 SYRINGE (ML) INJECTION PRN
Status: CANCELLED | OUTPATIENT
Start: 2019-01-04

## 2019-01-04 RX ORDER — SODIUM CHLORIDE 0.9 % (FLUSH) 0.9 %
10 SYRINGE (ML) INJECTION PRN
Status: DISCONTINUED | OUTPATIENT
Start: 2019-01-04 | End: 2019-01-05 | Stop reason: HOSPADM

## 2019-01-04 RX ADMIN — PEGFILGRASTIM 6 MG: 6 INJECTION SUBCUTANEOUS at 14:28

## 2019-01-04 RX ADMIN — SODIUM CHLORIDE, PRESERVATIVE FREE 500 UNITS: 5 INJECTION INTRAVENOUS at 14:25

## 2019-01-04 RX ADMIN — Medication 10 ML: at 14:25

## 2019-01-04 NOTE — PROGRESS NOTES
Pt is here for CADD pump d/c & neulasta. Pt states she has more nausea this time around, reports no vomiting, pt has been taking zofran and compazine. Pt was discharged in stable condition and will return on 1/15 for office visit.

## 2019-01-07 ENCOUNTER — TELEPHONE (OUTPATIENT)
Dept: ONCOLOGY | Age: 59
End: 2019-01-07

## 2019-01-08 ENCOUNTER — TELEPHONE (OUTPATIENT)
Dept: ONCOLOGY | Age: 59
End: 2019-01-08

## 2019-01-10 ENCOUNTER — TELEPHONE (OUTPATIENT)
Dept: ONCOLOGY | Age: 59
End: 2019-01-10

## 2019-01-10 ENCOUNTER — APPOINTMENT (OUTPATIENT)
Dept: CT IMAGING | Age: 59
DRG: 092 | End: 2019-01-10
Payer: COMMERCIAL

## 2019-01-10 ENCOUNTER — APPOINTMENT (OUTPATIENT)
Dept: MRI IMAGING | Age: 59
DRG: 092 | End: 2019-01-10
Payer: COMMERCIAL

## 2019-01-10 ENCOUNTER — HOSPITAL ENCOUNTER (INPATIENT)
Age: 59
LOS: 6 days | Discharge: NURSING, REHAB OR LONG-TERM CARE | DRG: 092 | End: 2019-01-16
Attending: EMERGENCY MEDICINE | Admitting: INTERNAL MEDICINE
Payer: COMMERCIAL

## 2019-01-10 DIAGNOSIS — R29.898 WEAKNESS OF BOTH LOWER EXTREMITIES: Primary | ICD-10-CM

## 2019-01-10 PROBLEM — R53.1 WEAKNESS: Status: ACTIVE | Noted: 2019-01-10

## 2019-01-10 LAB
ABSOLUTE EOS #: 0 K/UL (ref 0–0.4)
ABSOLUTE IMMATURE GRANULOCYTE: ABNORMAL K/UL (ref 0–0.3)
ABSOLUTE LYMPH #: 0.3 K/UL (ref 1–4.8)
ABSOLUTE MONO #: 0.9 K/UL (ref 0.2–0.8)
ANION GAP SERPL CALCULATED.3IONS-SCNC: 16 MMOL/L (ref 9–17)
BASOPHILS # BLD: 0 %
BASOPHILS ABSOLUTE: 0 K/UL (ref 0–0.2)
BUN BLDV-MCNC: 7 MG/DL (ref 6–20)
BUN/CREAT BLD: 14 (ref 9–20)
CALCIUM SERPL-MCNC: 9.6 MG/DL (ref 8.6–10.4)
CHLORIDE BLD-SCNC: 101 MMOL/L (ref 98–107)
CO2: 22 MMOL/L (ref 20–31)
CREAT SERPL-MCNC: 0.51 MG/DL (ref 0.5–0.9)
DIFFERENTIAL TYPE: ABNORMAL
EOSINOPHILS RELATIVE PERCENT: 0 % (ref 1–4)
GFR AFRICAN AMERICAN: >60 ML/MIN
GFR NON-AFRICAN AMERICAN: >60 ML/MIN
GFR SERPL CREATININE-BSD FRML MDRD: ABNORMAL ML/MIN/{1.73_M2}
GFR SERPL CREATININE-BSD FRML MDRD: ABNORMAL ML/MIN/{1.73_M2}
GLUCOSE BLD-MCNC: 112 MG/DL (ref 70–99)
HCT VFR BLD CALC: 38.6 % (ref 36–46)
HEMOGLOBIN: 12.9 G/DL (ref 12–16)
IMMATURE GRANULOCYTES: ABNORMAL %
LYMPHOCYTES # BLD: 2 % (ref 24–44)
MCH RBC QN AUTO: 29.9 PG (ref 26–34)
MCHC RBC AUTO-ENTMCNC: 33.3 G/DL (ref 31–37)
MCV RBC AUTO: 89.7 FL (ref 80–100)
MONOCYTES # BLD: 6 % (ref 1–7)
MORPHOLOGY: ABNORMAL
NRBC AUTOMATED: ABNORMAL PER 100 WBC
PDW BLD-RTO: 13.8 % (ref 11.5–14.5)
PLATELET # BLD: 127 K/UL (ref 130–400)
PLATELET ESTIMATE: ABNORMAL
PMV BLD AUTO: 9.7 FL (ref 6–12)
POTASSIUM SERPL-SCNC: 3.5 MMOL/L (ref 3.7–5.3)
RBC # BLD: 4.3 M/UL (ref 4–5.2)
RBC # BLD: ABNORMAL 10*6/UL
SEG NEUTROPHILS: 92 % (ref 36–66)
SEGMENTED NEUTROPHILS ABSOLUTE COUNT: 13.8 K/UL (ref 1.8–7.7)
SODIUM BLD-SCNC: 139 MMOL/L (ref 135–144)
WBC # BLD: 15 K/UL (ref 3.5–11)
WBC # BLD: ABNORMAL 10*3/UL

## 2019-01-10 PROCEDURE — 80048 BASIC METABOLIC PNL TOTAL CA: CPT

## 2019-01-10 PROCEDURE — 99285 EMERGENCY DEPT VISIT HI MDM: CPT

## 2019-01-10 PROCEDURE — A9579 GAD-BASE MR CONTRAST NOS,1ML: HCPCS | Performed by: EMERGENCY MEDICINE

## 2019-01-10 PROCEDURE — 72158 MRI LUMBAR SPINE W/O & W/DYE: CPT

## 2019-01-10 PROCEDURE — 1200000000 HC SEMI PRIVATE

## 2019-01-10 PROCEDURE — 2580000003 HC RX 258: Performed by: EMERGENCY MEDICINE

## 2019-01-10 PROCEDURE — 2580000003 HC RX 258: Performed by: NURSE PRACTITIONER

## 2019-01-10 PROCEDURE — 6370000000 HC RX 637 (ALT 250 FOR IP): Performed by: NURSE PRACTITIONER

## 2019-01-10 PROCEDURE — 6360000004 HC RX CONTRAST MEDICATION: Performed by: EMERGENCY MEDICINE

## 2019-01-10 PROCEDURE — 72157 MRI CHEST SPINE W/O & W/DYE: CPT

## 2019-01-10 PROCEDURE — 85025 COMPLETE CBC W/AUTO DIFF WBC: CPT

## 2019-01-10 RX ORDER — NICOTINE 21 MG/24HR
1 PATCH, TRANSDERMAL 24 HOURS TRANSDERMAL DAILY
Status: DISCONTINUED | OUTPATIENT
Start: 2019-01-11 | End: 2019-01-16 | Stop reason: HOSPADM

## 2019-01-10 RX ORDER — ONDANSETRON 2 MG/ML
4 INJECTION INTRAMUSCULAR; INTRAVENOUS EVERY 6 HOURS PRN
Status: DISCONTINUED | OUTPATIENT
Start: 2019-01-10 | End: 2019-01-16 | Stop reason: HOSPADM

## 2019-01-10 RX ORDER — POTASSIUM CHLORIDE 20MEQ/15ML
40 LIQUID (ML) ORAL PRN
Status: DISCONTINUED | OUTPATIENT
Start: 2019-01-10 | End: 2019-01-16 | Stop reason: HOSPADM

## 2019-01-10 RX ORDER — SODIUM CHLORIDE 0.9 % (FLUSH) 0.9 %
10 SYRINGE (ML) INJECTION
Status: COMPLETED | OUTPATIENT
Start: 2019-01-10 | End: 2019-01-10

## 2019-01-10 RX ORDER — MAGNESIUM SULFATE 1 G/100ML
1 INJECTION INTRAVENOUS PRN
Status: DISCONTINUED | OUTPATIENT
Start: 2019-01-10 | End: 2019-01-16 | Stop reason: HOSPADM

## 2019-01-10 RX ORDER — POTASSIUM CHLORIDE 7.45 MG/ML
10 INJECTION INTRAVENOUS PRN
Status: DISCONTINUED | OUTPATIENT
Start: 2019-01-10 | End: 2019-01-16 | Stop reason: HOSPADM

## 2019-01-10 RX ORDER — SENNA AND DOCUSATE SODIUM 50; 8.6 MG/1; MG/1
1 TABLET, FILM COATED ORAL DAILY PRN
Status: DISCONTINUED | OUTPATIENT
Start: 2019-01-10 | End: 2019-01-16 | Stop reason: HOSPADM

## 2019-01-10 RX ORDER — SODIUM CHLORIDE 0.9 % (FLUSH) 0.9 %
10 SYRINGE (ML) INJECTION EVERY 12 HOURS SCHEDULED
Status: DISCONTINUED | OUTPATIENT
Start: 2019-01-10 | End: 2019-01-16 | Stop reason: HOSPADM

## 2019-01-10 RX ORDER — BISACODYL 10 MG
10 SUPPOSITORY, RECTAL RECTAL DAILY PRN
Status: DISCONTINUED | OUTPATIENT
Start: 2019-01-10 | End: 2019-01-16 | Stop reason: HOSPADM

## 2019-01-10 RX ORDER — SODIUM CHLORIDE 0.9 % (FLUSH) 0.9 %
10 SYRINGE (ML) INJECTION PRN
Status: DISCONTINUED | OUTPATIENT
Start: 2019-01-10 | End: 2019-01-16 | Stop reason: HOSPADM

## 2019-01-10 RX ORDER — POTASSIUM CHLORIDE 20 MEQ/1
40 TABLET, EXTENDED RELEASE ORAL PRN
Status: DISCONTINUED | OUTPATIENT
Start: 2019-01-10 | End: 2019-01-16 | Stop reason: HOSPADM

## 2019-01-10 RX ORDER — OLANZAPINE 5 MG/1
2.5 TABLET ORAL NIGHTLY
Status: DISCONTINUED | OUTPATIENT
Start: 2019-01-10 | End: 2019-01-16 | Stop reason: HOSPADM

## 2019-01-10 RX ORDER — ONDANSETRON 4 MG/1
4 TABLET, ORALLY DISINTEGRATING ORAL EVERY 6 HOURS PRN
Status: DISCONTINUED | OUTPATIENT
Start: 2019-01-10 | End: 2019-01-16 | Stop reason: HOSPADM

## 2019-01-10 RX ORDER — ACETAMINOPHEN 325 MG/1
650 TABLET ORAL EVERY 4 HOURS PRN
Status: DISCONTINUED | OUTPATIENT
Start: 2019-01-10 | End: 2019-01-16 | Stop reason: HOSPADM

## 2019-01-10 RX ORDER — ONDANSETRON 2 MG/ML
4 INJECTION INTRAMUSCULAR; INTRAVENOUS EVERY 6 HOURS PRN
Status: DISCONTINUED | OUTPATIENT
Start: 2019-01-10 | End: 2019-01-10

## 2019-01-10 RX ADMIN — Medication 10 ML: at 22:29

## 2019-01-10 RX ADMIN — GADOTERIDOL 10 ML: 279.3 INJECTION, SOLUTION INTRAVENOUS at 18:25

## 2019-01-10 RX ADMIN — Medication 10 ML: at 18:25

## 2019-01-10 RX ADMIN — OLANZAPINE 2.5 MG: 5 TABLET, FILM COATED ORAL at 22:28

## 2019-01-10 ASSESSMENT — PAIN SCALES - GENERAL: PAINLEVEL_OUTOF10: 0

## 2019-01-11 PROBLEM — E44.0 MODERATE PROTEIN-CALORIE MALNUTRITION (HCC): Status: ACTIVE | Noted: 2019-01-11

## 2019-01-11 PROBLEM — E86.0 DEHYDRATION: Status: ACTIVE | Noted: 2019-01-11

## 2019-01-11 PROBLEM — R63.4 ABNORMAL LOSS OF WEIGHT: Status: ACTIVE | Noted: 2019-01-11

## 2019-01-11 PROBLEM — G82.20 PARTIAL PARALYSIS OF BOTH LOWER LIMBS (HCC): Status: ACTIVE | Noted: 2019-01-11

## 2019-01-11 LAB
ANION GAP SERPL CALCULATED.3IONS-SCNC: 13 MMOL/L (ref 9–17)
BUN BLDV-MCNC: 9 MG/DL (ref 6–20)
BUN/CREAT BLD: 13 (ref 9–20)
CALCIUM SERPL-MCNC: 9.3 MG/DL (ref 8.6–10.4)
CHLORIDE BLD-SCNC: 100 MMOL/L (ref 98–107)
CO2: 25 MMOL/L (ref 20–31)
CREAT SERPL-MCNC: 0.67 MG/DL (ref 0.5–0.9)
GFR AFRICAN AMERICAN: >60 ML/MIN
GFR NON-AFRICAN AMERICAN: >60 ML/MIN
GFR SERPL CREATININE-BSD FRML MDRD: NORMAL ML/MIN/{1.73_M2}
GFR SERPL CREATININE-BSD FRML MDRD: NORMAL ML/MIN/{1.73_M2}
GLUCOSE BLD-MCNC: 95 MG/DL (ref 70–99)
HCT VFR BLD CALC: 33.7 % (ref 36–46)
HEMOGLOBIN: 11.4 G/DL (ref 12–16)
INR BLD: 1.1
MCH RBC QN AUTO: 30 PG (ref 26–34)
MCHC RBC AUTO-ENTMCNC: 33.7 G/DL (ref 31–37)
MCV RBC AUTO: 88.9 FL (ref 80–100)
NRBC AUTOMATED: ABNORMAL PER 100 WBC
PDW BLD-RTO: 14 % (ref 11.5–14.5)
PLATELET # BLD: 134 K/UL (ref 130–400)
PMV BLD AUTO: 9 FL (ref 6–12)
POTASSIUM SERPL-SCNC: 3.5 MMOL/L (ref 3.7–5.3)
POTASSIUM SERPL-SCNC: 3.9 MMOL/L (ref 3.7–5.3)
PROTHROMBIN TIME: 11.4 SEC (ref 9.7–11.6)
RBC # BLD: 3.8 M/UL (ref 4–5.2)
SODIUM BLD-SCNC: 138 MMOL/L (ref 135–144)
WBC # BLD: 9.5 K/UL (ref 3.5–11)

## 2019-01-11 PROCEDURE — 97530 THERAPEUTIC ACTIVITIES: CPT

## 2019-01-11 PROCEDURE — 2580000003 HC RX 258: Performed by: PSYCHIATRY & NEUROLOGY

## 2019-01-11 PROCEDURE — 1200000000 HC SEMI PRIVATE

## 2019-01-11 PROCEDURE — 2580000003 HC RX 258: Performed by: NURSE PRACTITIONER

## 2019-01-11 PROCEDURE — 6370000000 HC RX 637 (ALT 250 FOR IP): Performed by: NURSE PRACTITIONER

## 2019-01-11 PROCEDURE — 6360000002 HC RX W HCPCS: Performed by: PSYCHIATRY & NEUROLOGY

## 2019-01-11 PROCEDURE — 97166 OT EVAL MOD COMPLEX 45 MIN: CPT

## 2019-01-11 PROCEDURE — 85610 PROTHROMBIN TIME: CPT

## 2019-01-11 PROCEDURE — 97162 PT EVAL MOD COMPLEX 30 MIN: CPT

## 2019-01-11 PROCEDURE — 80048 BASIC METABOLIC PNL TOTAL CA: CPT

## 2019-01-11 PROCEDURE — 84132 ASSAY OF SERUM POTASSIUM: CPT

## 2019-01-11 PROCEDURE — 97535 SELF CARE MNGMENT TRAINING: CPT

## 2019-01-11 PROCEDURE — 36415 COLL VENOUS BLD VENIPUNCTURE: CPT

## 2019-01-11 PROCEDURE — 99223 1ST HOSP IP/OBS HIGH 75: CPT | Performed by: INTERNAL MEDICINE

## 2019-01-11 PROCEDURE — 85027 COMPLETE CBC AUTOMATED: CPT

## 2019-01-11 PROCEDURE — 2580000003 HC RX 258: Performed by: INTERNAL MEDICINE

## 2019-01-11 RX ORDER — DEXTROSE AND SODIUM CHLORIDE 5; .9 G/100ML; G/100ML
INJECTION, SOLUTION INTRAVENOUS CONTINUOUS
Status: DISCONTINUED | OUTPATIENT
Start: 2019-01-11 | End: 2019-01-12

## 2019-01-11 RX ADMIN — POTASSIUM CHLORIDE 40 MEQ: 20 TABLET, EXTENDED RELEASE ORAL at 04:26

## 2019-01-11 RX ADMIN — SODIUM CHLORIDE 250 MG: 9 INJECTION, SOLUTION INTRAVENOUS at 13:10

## 2019-01-11 RX ADMIN — Medication 10 ML: at 11:08

## 2019-01-11 RX ADMIN — DEXTROSE AND SODIUM CHLORIDE: 5; 900 INJECTION, SOLUTION INTRAVENOUS at 21:33

## 2019-01-11 RX ADMIN — DEXTROSE AND SODIUM CHLORIDE: 5; 900 INJECTION, SOLUTION INTRAVENOUS at 11:07

## 2019-01-12 LAB
ANION GAP SERPL CALCULATED.3IONS-SCNC: 1 MMOL/L (ref 9–17)
BUN BLDV-MCNC: 8 MG/DL (ref 6–20)
BUN/CREAT BLD: 15 (ref 9–20)
CALCIUM SERPL-MCNC: 9.2 MG/DL (ref 8.6–10.4)
CHLORIDE BLD-SCNC: 108 MMOL/L (ref 98–107)
CO2: 33 MMOL/L (ref 20–31)
CREAT SERPL-MCNC: 0.53 MG/DL (ref 0.5–0.9)
GFR AFRICAN AMERICAN: >60 ML/MIN
GFR NON-AFRICAN AMERICAN: >60 ML/MIN
GFR SERPL CREATININE-BSD FRML MDRD: ABNORMAL ML/MIN/{1.73_M2}
GFR SERPL CREATININE-BSD FRML MDRD: ABNORMAL ML/MIN/{1.73_M2}
GLUCOSE BLD-MCNC: 224 MG/DL (ref 70–99)
POTASSIUM SERPL-SCNC: 4.2 MMOL/L (ref 3.7–5.3)
PREALBUMIN: 12.6 MG/DL (ref 20–40)
SODIUM BLD-SCNC: 142 MMOL/L (ref 135–144)

## 2019-01-12 PROCEDURE — 84134 ASSAY OF PREALBUMIN: CPT

## 2019-01-12 PROCEDURE — 97530 THERAPEUTIC ACTIVITIES: CPT

## 2019-01-12 PROCEDURE — 1200000000 HC SEMI PRIVATE

## 2019-01-12 PROCEDURE — 36415 COLL VENOUS BLD VENIPUNCTURE: CPT

## 2019-01-12 PROCEDURE — 2580000003 HC RX 258: Performed by: NURSE PRACTITIONER

## 2019-01-12 PROCEDURE — 80048 BASIC METABOLIC PNL TOTAL CA: CPT

## 2019-01-12 PROCEDURE — 99232 SBSQ HOSP IP/OBS MODERATE 35: CPT | Performed by: INTERNAL MEDICINE

## 2019-01-12 PROCEDURE — 97535 SELF CARE MNGMENT TRAINING: CPT

## 2019-01-12 PROCEDURE — 97110 THERAPEUTIC EXERCISES: CPT

## 2019-01-12 PROCEDURE — 2580000003 HC RX 258: Performed by: PSYCHIATRY & NEUROLOGY

## 2019-01-12 PROCEDURE — 6360000002 HC RX W HCPCS: Performed by: PSYCHIATRY & NEUROLOGY

## 2019-01-12 RX ORDER — POLYETHYLENE GLYCOL 3350 17 G/17G
17 POWDER, FOR SOLUTION ORAL DAILY PRN
COMMUNITY
End: 2019-02-12 | Stop reason: ALTCHOICE

## 2019-01-12 RX ORDER — METHYLPREDNISOLONE SODIUM SUCCINATE 125 MG/2ML
125 INJECTION, POWDER, LYOPHILIZED, FOR SOLUTION INTRAMUSCULAR; INTRAVENOUS EVERY 8 HOURS
Status: COMPLETED | OUTPATIENT
Start: 2019-01-12 | End: 2019-01-12

## 2019-01-12 RX ADMIN — Medication 10 ML: at 19:21

## 2019-01-12 RX ADMIN — SODIUM CHLORIDE 250 MG: 9 INJECTION, SOLUTION INTRAVENOUS at 00:41

## 2019-01-12 RX ADMIN — METHYLPREDNISOLONE SODIUM SUCCINATE 125 MG: 125 INJECTION, POWDER, FOR SOLUTION INTRAMUSCULAR; INTRAVENOUS at 19:21

## 2019-01-12 RX ADMIN — METHYLPREDNISOLONE SODIUM SUCCINATE 125 MG: 125 INJECTION, POWDER, FOR SOLUTION INTRAMUSCULAR; INTRAVENOUS at 10:39

## 2019-01-13 PROCEDURE — 99232 SBSQ HOSP IP/OBS MODERATE 35: CPT | Performed by: INTERNAL MEDICINE

## 2019-01-13 PROCEDURE — 2580000003 HC RX 258: Performed by: NURSE PRACTITIONER

## 2019-01-13 PROCEDURE — 97530 THERAPEUTIC ACTIVITIES: CPT

## 2019-01-13 PROCEDURE — 6360000002 HC RX W HCPCS: Performed by: PSYCHIATRY & NEUROLOGY

## 2019-01-13 PROCEDURE — 97110 THERAPEUTIC EXERCISES: CPT

## 2019-01-13 PROCEDURE — 1200000000 HC SEMI PRIVATE

## 2019-01-13 RX ORDER — METHYLPREDNISOLONE SODIUM SUCCINATE 125 MG/2ML
75 INJECTION, POWDER, LYOPHILIZED, FOR SOLUTION INTRAMUSCULAR; INTRAVENOUS EVERY 8 HOURS
Status: COMPLETED | OUTPATIENT
Start: 2019-01-13 | End: 2019-01-13

## 2019-01-13 RX ADMIN — METHYLPREDNISOLONE SODIUM SUCCINATE 75 MG: 125 INJECTION, POWDER, FOR SOLUTION INTRAMUSCULAR; INTRAVENOUS at 21:16

## 2019-01-13 RX ADMIN — Medication 10 ML: at 10:23

## 2019-01-13 RX ADMIN — METHYLPREDNISOLONE SODIUM SUCCINATE 75 MG: 125 INJECTION, POWDER, FOR SOLUTION INTRAMUSCULAR; INTRAVENOUS at 14:45

## 2019-01-13 RX ADMIN — Medication 10 ML: at 21:19

## 2019-01-13 ASSESSMENT — PAIN SCALES - GENERAL
PAINLEVEL_OUTOF10: 0

## 2019-01-14 ENCOUNTER — APPOINTMENT (OUTPATIENT)
Dept: CT IMAGING | Age: 59
DRG: 092 | End: 2019-01-14
Payer: COMMERCIAL

## 2019-01-14 ENCOUNTER — APPOINTMENT (OUTPATIENT)
Dept: MRI IMAGING | Age: 59
DRG: 092 | End: 2019-01-14
Payer: COMMERCIAL

## 2019-01-14 ENCOUNTER — TELEPHONE (OUTPATIENT)
Dept: ONCOLOGY | Age: 59
End: 2019-01-14

## 2019-01-14 PROBLEM — G95.11 SPINAL CORD INFARCTION (HCC): Status: ACTIVE | Noted: 2019-01-14

## 2019-01-14 PROCEDURE — 6360000004 HC RX CONTRAST MEDICATION: Performed by: PSYCHIATRY & NEUROLOGY

## 2019-01-14 PROCEDURE — 6370000000 HC RX 637 (ALT 250 FOR IP): Performed by: PSYCHIATRY & NEUROLOGY

## 2019-01-14 PROCEDURE — 97116 GAIT TRAINING THERAPY: CPT

## 2019-01-14 PROCEDURE — 72148 MRI LUMBAR SPINE W/O DYE: CPT

## 2019-01-14 PROCEDURE — 2580000003 HC RX 258: Performed by: INTERNAL MEDICINE

## 2019-01-14 PROCEDURE — 72157 MRI CHEST SPINE W/O & W/DYE: CPT

## 2019-01-14 PROCEDURE — 99232 SBSQ HOSP IP/OBS MODERATE 35: CPT | Performed by: INTERNAL MEDICINE

## 2019-01-14 PROCEDURE — 1200000000 HC SEMI PRIVATE

## 2019-01-14 PROCEDURE — 6360000004 HC RX CONTRAST MEDICATION: Performed by: INTERNAL MEDICINE

## 2019-01-14 PROCEDURE — A9579 GAD-BASE MR CONTRAST NOS,1ML: HCPCS | Performed by: INTERNAL MEDICINE

## 2019-01-14 PROCEDURE — 97110 THERAPEUTIC EXERCISES: CPT

## 2019-01-14 PROCEDURE — 6370000000 HC RX 637 (ALT 250 FOR IP): Performed by: INTERNAL MEDICINE

## 2019-01-14 PROCEDURE — 2580000003 HC RX 258: Performed by: NURSE PRACTITIONER

## 2019-01-14 PROCEDURE — 97530 THERAPEUTIC ACTIVITIES: CPT

## 2019-01-14 PROCEDURE — 71275 CT ANGIOGRAPHY CHEST: CPT

## 2019-01-14 PROCEDURE — 2580000003 HC RX 258: Performed by: PSYCHIATRY & NEUROLOGY

## 2019-01-14 PROCEDURE — 6370000000 HC RX 637 (ALT 250 FOR IP): Performed by: NURSE PRACTITIONER

## 2019-01-14 PROCEDURE — 99254 IP/OBS CNSLTJ NEW/EST MOD 60: CPT | Performed by: INTERNAL MEDICINE

## 2019-01-14 RX ORDER — ASPIRIN 81 MG/1
81 TABLET, CHEWABLE ORAL DAILY
Status: DISCONTINUED | OUTPATIENT
Start: 2019-01-14 | End: 2019-01-16 | Stop reason: HOSPADM

## 2019-01-14 RX ORDER — POLYETHYLENE GLYCOL 3350 17 G/17G
17 POWDER, FOR SOLUTION ORAL DAILY PRN
Status: DISCONTINUED | OUTPATIENT
Start: 2019-01-14 | End: 2019-01-16 | Stop reason: HOSPADM

## 2019-01-14 RX ORDER — SODIUM CHLORIDE 0.9 % (FLUSH) 0.9 %
10 SYRINGE (ML) INJECTION
Status: COMPLETED | OUTPATIENT
Start: 2019-01-14 | End: 2019-01-14

## 2019-01-14 RX ORDER — 0.9 % SODIUM CHLORIDE 0.9 %
50 INTRAVENOUS SOLUTION INTRAVENOUS ONCE
Status: COMPLETED | OUTPATIENT
Start: 2019-01-14 | End: 2019-01-14

## 2019-01-14 RX ORDER — PREDNISONE 10 MG/1
10 TABLET ORAL 2 TIMES DAILY
Status: DISCONTINUED | OUTPATIENT
Start: 2019-01-14 | End: 2019-01-16 | Stop reason: HOSPADM

## 2019-01-14 RX ORDER — SODIUM CHLORIDE 0.9 % (FLUSH) 0.9 %
10 SYRINGE (ML) INJECTION PRN
Status: DISCONTINUED | OUTPATIENT
Start: 2019-01-14 | End: 2019-01-16 | Stop reason: HOSPADM

## 2019-01-14 RX ORDER — 0.9 % SODIUM CHLORIDE 0.9 %
80 INTRAVENOUS SOLUTION INTRAVENOUS ONCE
Status: COMPLETED | OUTPATIENT
Start: 2019-01-14 | End: 2019-01-14

## 2019-01-14 RX ADMIN — Medication 10 ML: at 11:45

## 2019-01-14 RX ADMIN — GADOTERIDOL 10 ML: 279.3 INJECTION, SOLUTION INTRAVENOUS at 11:44

## 2019-01-14 RX ADMIN — SODIUM CHLORIDE 80 ML: 0.9 INJECTION, SOLUTION INTRAVENOUS at 15:23

## 2019-01-14 RX ADMIN — Medication 10 ML: at 15:18

## 2019-01-14 RX ADMIN — PREDNISONE 10 MG: 10 TABLET ORAL at 10:13

## 2019-01-14 RX ADMIN — SODIUM CHLORIDE 50 ML: 0.9 INJECTION, SOLUTION INTRAVENOUS at 15:22

## 2019-01-14 RX ADMIN — ASPIRIN 81 MG 81 MG: 81 TABLET ORAL at 14:21

## 2019-01-14 RX ADMIN — IOPAMIDOL 100 ML: 755 INJECTION, SOLUTION INTRAVENOUS at 15:18

## 2019-01-14 RX ADMIN — PREDNISONE 10 MG: 10 TABLET ORAL at 21:34

## 2019-01-14 RX ADMIN — DOCUSATE SODIUM AND SENNOSIDES 1 TABLET: 50; 8.6 TABLET, FILM COATED ORAL at 08:44

## 2019-01-14 RX ADMIN — Medication 10 ML: at 21:35

## 2019-01-14 ASSESSMENT — PAIN SCALES - GENERAL: PAINLEVEL_OUTOF10: 0

## 2019-01-15 ENCOUNTER — TELEPHONE (OUTPATIENT)
Dept: ONCOLOGY | Age: 59
End: 2019-01-15

## 2019-01-15 DIAGNOSIS — C25.1 MALIGNANT NEOPLASM OF BODY OF PANCREAS (HCC): Primary | ICD-10-CM

## 2019-01-15 PROCEDURE — 97116 GAIT TRAINING THERAPY: CPT

## 2019-01-15 PROCEDURE — 1200000000 HC SEMI PRIVATE

## 2019-01-15 PROCEDURE — 6370000000 HC RX 637 (ALT 250 FOR IP): Performed by: PSYCHIATRY & NEUROLOGY

## 2019-01-15 PROCEDURE — 97535 SELF CARE MNGMENT TRAINING: CPT

## 2019-01-15 PROCEDURE — 6370000000 HC RX 637 (ALT 250 FOR IP): Performed by: INTERNAL MEDICINE

## 2019-01-15 PROCEDURE — 99232 SBSQ HOSP IP/OBS MODERATE 35: CPT | Performed by: INTERNAL MEDICINE

## 2019-01-15 PROCEDURE — 97530 THERAPEUTIC ACTIVITIES: CPT

## 2019-01-15 PROCEDURE — 2580000003 HC RX 258: Performed by: NURSE PRACTITIONER

## 2019-01-15 PROCEDURE — 97110 THERAPEUTIC EXERCISES: CPT

## 2019-01-15 PROCEDURE — 6370000000 HC RX 637 (ALT 250 FOR IP): Performed by: NURSE PRACTITIONER

## 2019-01-15 RX ORDER — NIFEDIPINE 30 MG/1
30 TABLET, EXTENDED RELEASE ORAL DAILY
Status: DISCONTINUED | OUTPATIENT
Start: 2019-01-15 | End: 2019-01-16 | Stop reason: HOSPADM

## 2019-01-15 RX ADMIN — PREDNISONE 10 MG: 10 TABLET ORAL at 19:54

## 2019-01-15 RX ADMIN — POLYETHYLENE GLYCOL (3350) 17 G: 17 POWDER, FOR SOLUTION ORAL at 10:38

## 2019-01-15 RX ADMIN — Medication 10 ML: at 10:32

## 2019-01-15 RX ADMIN — NIFEDIPINE 30 MG: 30 TABLET, FILM COATED, EXTENDED RELEASE ORAL at 17:34

## 2019-01-15 RX ADMIN — ASPIRIN 81 MG 81 MG: 81 TABLET ORAL at 10:29

## 2019-01-15 RX ADMIN — Medication 10 ML: at 19:54

## 2019-01-15 RX ADMIN — PREDNISONE 10 MG: 10 TABLET ORAL at 10:29

## 2019-01-15 ASSESSMENT — PAIN SCALES - GENERAL
PAINLEVEL_OUTOF10: 0
PAINLEVEL_OUTOF10: 0

## 2019-01-16 VITALS
HEART RATE: 97 BPM | TEMPERATURE: 98.7 F | DIASTOLIC BLOOD PRESSURE: 67 MMHG | BODY MASS INDEX: 17.83 KG/M2 | WEIGHT: 104.4 LBS | RESPIRATION RATE: 16 BRPM | HEIGHT: 64 IN | OXYGEN SATURATION: 97 % | SYSTOLIC BLOOD PRESSURE: 103 MMHG

## 2019-01-16 LAB
ANION GAP SERPL CALCULATED.3IONS-SCNC: 10 MMOL/L (ref 9–17)
BUN BLDV-MCNC: 19 MG/DL (ref 6–20)
BUN/CREAT BLD: 33 (ref 9–20)
CALCIUM SERPL-MCNC: 9.6 MG/DL (ref 8.6–10.4)
CHLORIDE BLD-SCNC: 99 MMOL/L (ref 98–107)
CO2: 29 MMOL/L (ref 20–31)
CREAT SERPL-MCNC: 0.58 MG/DL (ref 0.5–0.9)
GFR AFRICAN AMERICAN: >60 ML/MIN
GFR NON-AFRICAN AMERICAN: >60 ML/MIN
GFR SERPL CREATININE-BSD FRML MDRD: ABNORMAL ML/MIN/{1.73_M2}
GFR SERPL CREATININE-BSD FRML MDRD: ABNORMAL ML/MIN/{1.73_M2}
GLUCOSE BLD-MCNC: 95 MG/DL (ref 70–99)
MAGNESIUM: 2.6 MG/DL (ref 1.6–2.6)
POTASSIUM SERPL-SCNC: 4 MMOL/L (ref 3.7–5.3)
SODIUM BLD-SCNC: 138 MMOL/L (ref 135–144)

## 2019-01-16 PROCEDURE — 6370000000 HC RX 637 (ALT 250 FOR IP): Performed by: NURSE PRACTITIONER

## 2019-01-16 PROCEDURE — 6370000000 HC RX 637 (ALT 250 FOR IP): Performed by: INTERNAL MEDICINE

## 2019-01-16 PROCEDURE — 2580000003 HC RX 258: Performed by: NURSE PRACTITIONER

## 2019-01-16 PROCEDURE — 97116 GAIT TRAINING THERAPY: CPT

## 2019-01-16 PROCEDURE — 80048 BASIC METABOLIC PNL TOTAL CA: CPT

## 2019-01-16 PROCEDURE — 36415 COLL VENOUS BLD VENIPUNCTURE: CPT

## 2019-01-16 PROCEDURE — 99232 SBSQ HOSP IP/OBS MODERATE 35: CPT | Performed by: INTERNAL MEDICINE

## 2019-01-16 PROCEDURE — 97530 THERAPEUTIC ACTIVITIES: CPT

## 2019-01-16 PROCEDURE — 97535 SELF CARE MNGMENT TRAINING: CPT

## 2019-01-16 PROCEDURE — 6370000000 HC RX 637 (ALT 250 FOR IP): Performed by: PSYCHIATRY & NEUROLOGY

## 2019-01-16 PROCEDURE — 83735 ASSAY OF MAGNESIUM: CPT

## 2019-01-16 RX ORDER — NIFEDIPINE 30 MG/1
30 TABLET, FILM COATED, EXTENDED RELEASE ORAL DAILY
Qty: 30 TABLET | Refills: 3 | DISCHARGE
Start: 2019-01-17 | End: 2019-02-12 | Stop reason: ALTCHOICE

## 2019-01-16 RX ORDER — PREDNISONE 10 MG/1
10 TABLET ORAL 2 TIMES DAILY
Qty: 20 TABLET | Refills: 0 | DISCHARGE
Start: 2019-01-16 | End: 2019-01-26

## 2019-01-16 RX ADMIN — ASPIRIN 81 MG 81 MG: 81 TABLET ORAL at 08:37

## 2019-01-16 RX ADMIN — Medication 10 ML: at 08:37

## 2019-01-16 RX ADMIN — NIFEDIPINE 30 MG: 30 TABLET, FILM COATED, EXTENDED RELEASE ORAL at 08:37

## 2019-01-16 RX ADMIN — DOCUSATE SODIUM AND SENNOSIDES 1 TABLET: 50; 8.6 TABLET, FILM COATED ORAL at 08:37

## 2019-01-16 RX ADMIN — PREDNISONE 10 MG: 10 TABLET ORAL at 08:37

## 2019-01-16 RX ADMIN — POLYETHYLENE GLYCOL (3350) 17 G: 17 POWDER, FOR SOLUTION ORAL at 08:37

## 2019-01-16 ASSESSMENT — PAIN SCALES - GENERAL: PAINLEVEL_OUTOF10: 0

## 2019-01-21 ENCOUNTER — TELEPHONE (OUTPATIENT)
Dept: ONCOLOGY | Age: 59
End: 2019-01-21

## 2019-01-22 ENCOUNTER — HOSPITAL ENCOUNTER (OUTPATIENT)
Dept: CT IMAGING | Age: 59
Discharge: HOME | End: 2019-01-24
Payer: COMMERCIAL

## 2019-01-22 ENCOUNTER — TELEPHONE (OUTPATIENT)
Dept: ONCOLOGY | Age: 59
End: 2019-01-22

## 2019-01-22 DIAGNOSIS — C25.1 MALIGNANT NEOPLASM OF BODY OF PANCREAS (HCC): ICD-10-CM

## 2019-01-22 PROCEDURE — 2580000003 HC RX 258: Performed by: INTERNAL MEDICINE

## 2019-01-22 PROCEDURE — 74160 CT ABDOMEN W/CONTRAST: CPT

## 2019-01-22 PROCEDURE — 6360000004 HC RX CONTRAST MEDICATION: Performed by: INTERNAL MEDICINE

## 2019-01-22 RX ORDER — 0.9 % SODIUM CHLORIDE 0.9 %
80 INTRAVENOUS SOLUTION INTRAVENOUS ONCE
Status: COMPLETED | OUTPATIENT
Start: 2019-01-22 | End: 2019-01-22

## 2019-01-22 RX ORDER — SODIUM CHLORIDE 0.9 % (FLUSH) 0.9 %
10 SYRINGE (ML) INJECTION PRN
Status: DISCONTINUED | OUTPATIENT
Start: 2019-01-22 | End: 2019-01-25 | Stop reason: HOSPADM

## 2019-01-22 RX ADMIN — SODIUM CHLORIDE 80 ML: 9 INJECTION, SOLUTION INTRAVENOUS at 11:46

## 2019-01-22 RX ADMIN — Medication 10 ML: at 11:46

## 2019-01-22 RX ADMIN — IOHEXOL 50 ML: 240 INJECTION, SOLUTION INTRATHECAL; INTRAVASCULAR; INTRAVENOUS; ORAL at 11:46

## 2019-01-22 RX ADMIN — IOPAMIDOL 100 ML: 755 INJECTION, SOLUTION INTRAVENOUS at 11:45

## 2019-01-29 ENCOUNTER — OFFICE VISIT (OUTPATIENT)
Dept: ONCOLOGY | Age: 59
End: 2019-01-29
Payer: COMMERCIAL

## 2019-01-29 ENCOUNTER — TELEPHONE (OUTPATIENT)
Dept: ONCOLOGY | Age: 59
End: 2019-01-29

## 2019-01-29 VITALS
TEMPERATURE: 98.9 F | BODY MASS INDEX: 19.48 KG/M2 | HEART RATE: 113 BPM | WEIGHT: 113.5 LBS | DIASTOLIC BLOOD PRESSURE: 60 MMHG | SYSTOLIC BLOOD PRESSURE: 87 MMHG

## 2019-01-29 DIAGNOSIS — C25.1 MALIGNANT NEOPLASM OF BODY OF PANCREAS (HCC): ICD-10-CM

## 2019-01-29 PROCEDURE — 99215 OFFICE O/P EST HI 40 MIN: CPT | Performed by: INTERNAL MEDICINE

## 2019-01-29 PROCEDURE — 99211 OFF/OP EST MAY X REQ PHY/QHP: CPT | Performed by: INTERNAL MEDICINE

## 2019-01-29 RX ORDER — SODIUM CHLORIDE 0.9 % (FLUSH) 0.9 %
5 SYRINGE (ML) INJECTION PRN
Status: CANCELLED | OUTPATIENT
Start: 2019-02-26

## 2019-01-29 RX ORDER — PALONOSETRON 0.05 MG/ML
0.25 INJECTION, SOLUTION INTRAVENOUS ONCE
Status: CANCELLED | OUTPATIENT
Start: 2019-02-12

## 2019-01-29 RX ORDER — SODIUM CHLORIDE 0.9 % (FLUSH) 0.9 %
10 SYRINGE (ML) INJECTION PRN
Status: CANCELLED | OUTPATIENT
Start: 2019-02-26

## 2019-01-29 RX ORDER — ATROPINE SULFATE 0.4 MG/ML
0.4 AMPUL (ML) INJECTION
Status: CANCELLED | OUTPATIENT
Start: 2019-02-12

## 2019-01-29 RX ORDER — ATROPINE SULFATE 0.4 MG/ML
0.4 AMPUL (ML) INJECTION
Status: CANCELLED | OUTPATIENT
Start: 2019-02-26

## 2019-01-29 RX ORDER — SODIUM CHLORIDE 9 MG/ML
INJECTION, SOLUTION INTRAVENOUS ONCE
Status: CANCELLED | OUTPATIENT
Start: 2019-02-26 | End: 2019-02-26

## 2019-01-29 RX ORDER — SODIUM CHLORIDE 0.9 % (FLUSH) 0.9 %
5 SYRINGE (ML) INJECTION PRN
Status: CANCELLED | OUTPATIENT
Start: 2019-02-12

## 2019-01-29 RX ORDER — METHYLPREDNISOLONE SODIUM SUCCINATE 125 MG/2ML
125 INJECTION, POWDER, LYOPHILIZED, FOR SOLUTION INTRAMUSCULAR; INTRAVENOUS ONCE
Status: CANCELLED | OUTPATIENT
Start: 2019-02-12 | End: 2019-02-12

## 2019-01-29 RX ORDER — SODIUM CHLORIDE 9 MG/ML
INJECTION, SOLUTION INTRAVENOUS ONCE
Status: CANCELLED | OUTPATIENT
Start: 2019-02-12 | End: 2019-02-12

## 2019-01-29 RX ORDER — SODIUM CHLORIDE 0.9 % (FLUSH) 0.9 %
10 SYRINGE (ML) INJECTION PRN
Status: CANCELLED | OUTPATIENT
Start: 2019-02-12

## 2019-01-29 RX ORDER — 0.9 % SODIUM CHLORIDE 0.9 %
10 VIAL (ML) INJECTION ONCE
Status: CANCELLED | OUTPATIENT
Start: 2019-02-26 | End: 2019-02-26

## 2019-01-29 RX ORDER — OXYCODONE HYDROCHLORIDE 5 MG/1
5 CAPSULE ORAL EVERY 4 HOURS PRN
COMMUNITY
End: 2019-02-12

## 2019-01-29 RX ORDER — 0.9 % SODIUM CHLORIDE 0.9 %
10 VIAL (ML) INJECTION ONCE
Status: CANCELLED | OUTPATIENT
Start: 2019-02-12 | End: 2019-02-12

## 2019-01-29 RX ORDER — DIPHENHYDRAMINE HYDROCHLORIDE 50 MG/ML
50 INJECTION INTRAMUSCULAR; INTRAVENOUS ONCE
Status: CANCELLED | OUTPATIENT
Start: 2019-02-12 | End: 2019-02-12

## 2019-01-29 RX ORDER — METHYLPREDNISOLONE SODIUM SUCCINATE 125 MG/2ML
125 INJECTION, POWDER, LYOPHILIZED, FOR SOLUTION INTRAMUSCULAR; INTRAVENOUS ONCE
Status: CANCELLED | OUTPATIENT
Start: 2019-02-26 | End: 2019-02-26

## 2019-01-29 RX ORDER — SODIUM CHLORIDE 9 MG/ML
INJECTION, SOLUTION INTRAVENOUS CONTINUOUS
Status: CANCELLED | OUTPATIENT
Start: 2019-02-12

## 2019-01-29 RX ORDER — PROCHLORPERAZINE MALEATE 5 MG/1
5 TABLET ORAL EVERY 6 HOURS PRN
COMMUNITY
End: 2019-02-14 | Stop reason: SDUPTHER

## 2019-01-29 RX ORDER — PALONOSETRON 0.05 MG/ML
0.25 INJECTION, SOLUTION INTRAVENOUS ONCE
Status: CANCELLED | OUTPATIENT
Start: 2019-02-26

## 2019-01-29 RX ORDER — HEPARIN SODIUM (PORCINE) LOCK FLUSH IV SOLN 100 UNIT/ML 100 UNIT/ML
500 SOLUTION INTRAVENOUS PRN
Status: CANCELLED | OUTPATIENT
Start: 2019-02-12

## 2019-01-29 RX ORDER — HEPARIN SODIUM (PORCINE) LOCK FLUSH IV SOLN 100 UNIT/ML 100 UNIT/ML
500 SOLUTION INTRAVENOUS PRN
Status: CANCELLED | OUTPATIENT
Start: 2019-02-26

## 2019-01-29 RX ORDER — DIPHENHYDRAMINE HYDROCHLORIDE 50 MG/ML
50 INJECTION INTRAMUSCULAR; INTRAVENOUS ONCE
Status: CANCELLED | OUTPATIENT
Start: 2019-02-26 | End: 2019-02-26

## 2019-01-29 RX ORDER — DEXTROSE MONOHYDRATE 50 MG/ML
INJECTION, SOLUTION INTRAVENOUS ONCE
Status: CANCELLED | OUTPATIENT
Start: 2019-02-26 | End: 2019-02-26

## 2019-01-29 RX ORDER — DEXTROSE MONOHYDRATE 50 MG/ML
INJECTION, SOLUTION INTRAVENOUS ONCE
Status: CANCELLED | OUTPATIENT
Start: 2019-02-12 | End: 2019-02-12

## 2019-01-29 RX ORDER — SODIUM CHLORIDE 9 MG/ML
INJECTION, SOLUTION INTRAVENOUS CONTINUOUS
Status: CANCELLED | OUTPATIENT
Start: 2019-02-26

## 2019-02-04 ENCOUNTER — TELEPHONE (OUTPATIENT)
Dept: ONCOLOGY | Age: 59
End: 2019-02-04

## 2019-02-10 PROBLEM — E86.0 DEHYDRATION: Status: RESOLVED | Noted: 2019-01-11 | Resolved: 2019-02-10

## 2019-02-12 ENCOUNTER — TELEPHONE (OUTPATIENT)
Dept: ONCOLOGY | Age: 59
End: 2019-02-12

## 2019-02-12 ENCOUNTER — HOSPITAL ENCOUNTER (OUTPATIENT)
Dept: INFUSION THERAPY | Age: 59
Discharge: HOME | End: 2019-02-12
Payer: COMMERCIAL

## 2019-02-12 ENCOUNTER — OFFICE VISIT (OUTPATIENT)
Dept: ONCOLOGY | Age: 59
End: 2019-02-12
Payer: COMMERCIAL

## 2019-02-12 VITALS
TEMPERATURE: 97.7 F | HEART RATE: 81 BPM | BODY MASS INDEX: 19.22 KG/M2 | WEIGHT: 112 LBS | SYSTOLIC BLOOD PRESSURE: 131 MMHG | DIASTOLIC BLOOD PRESSURE: 82 MMHG

## 2019-02-12 DIAGNOSIS — F41.9 ANXIETY: ICD-10-CM

## 2019-02-12 DIAGNOSIS — C25.1 MALIGNANT NEOPLASM OF BODY OF PANCREAS (HCC): ICD-10-CM

## 2019-02-12 LAB
ABSOLUTE EOS #: 0.33 K/UL (ref 0–0.4)
ABSOLUTE IMMATURE GRANULOCYTE: ABNORMAL K/UL (ref 0–0.3)
ABSOLUTE LYMPH #: 0.94 K/UL (ref 1–4.8)
ABSOLUTE MONO #: 0.45 K/UL (ref 0.1–1.2)
ALBUMIN SERPL-MCNC: 3.6 G/DL (ref 3.5–5.2)
ALBUMIN/GLOBULIN RATIO: 1.3 (ref 1–2.5)
ALP BLD-CCNC: 64 U/L (ref 35–104)
ALT SERPL-CCNC: 29 U/L (ref 5–33)
ANION GAP SERPL CALCULATED.3IONS-SCNC: 11 MMOL/L (ref 9–17)
AST SERPL-CCNC: 22 U/L
BASOPHILS # BLD: 1 % (ref 0–2)
BASOPHILS ABSOLUTE: 0.02 K/UL (ref 0–0.2)
BILIRUB SERPL-MCNC: 0.34 MG/DL (ref 0.3–1.2)
BUN BLDV-MCNC: 13 MG/DL (ref 6–20)
BUN/CREAT BLD: ABNORMAL (ref 9–20)
CALCIUM SERPL-MCNC: 9.4 MG/DL (ref 8.6–10.4)
CHLORIDE BLD-SCNC: 104 MMOL/L (ref 98–107)
CO2: 27 MMOL/L (ref 20–31)
CREAT SERPL-MCNC: 0.57 MG/DL (ref 0.5–0.9)
DIFFERENTIAL TYPE: ABNORMAL
EOSINOPHILS RELATIVE PERCENT: 10 % (ref 1–4)
GFR AFRICAN AMERICAN: >60 ML/MIN
GFR NON-AFRICAN AMERICAN: >60 ML/MIN
GFR SERPL CREATININE-BSD FRML MDRD: ABNORMAL ML/MIN/{1.73_M2}
GFR SERPL CREATININE-BSD FRML MDRD: ABNORMAL ML/MIN/{1.73_M2}
GLUCOSE BLD-MCNC: 100 MG/DL (ref 70–99)
HCT VFR BLD CALC: 33.9 % (ref 36–46)
HEMOGLOBIN: 10.8 G/DL (ref 12–16)
IMMATURE GRANULOCYTES: ABNORMAL %
LYMPHOCYTES # BLD: 29 % (ref 24–44)
MCH RBC QN AUTO: 30.7 PG (ref 26–34)
MCHC RBC AUTO-ENTMCNC: 31.9 G/DL (ref 31–37)
MCV RBC AUTO: 96.3 FL (ref 80–100)
MONOCYTES # BLD: 14 % (ref 2–11)
NRBC AUTOMATED: ABNORMAL PER 100 WBC
PDW BLD-RTO: 15.8 % (ref 12.5–15.4)
PLATELET # BLD: 197 K/UL (ref 140–450)
PLATELET ESTIMATE: ABNORMAL
PMV BLD AUTO: 10.2 FL (ref 8–14)
POTASSIUM SERPL-SCNC: 4.2 MMOL/L (ref 3.7–5.3)
RBC # BLD: 3.52 M/UL (ref 4–5.2)
RBC # BLD: ABNORMAL 10*6/UL
SEG NEUTROPHILS: 46 % (ref 36–66)
SEGMENTED NEUTROPHILS ABSOLUTE COUNT: 1.51 K/UL (ref 1.8–7.7)
SODIUM BLD-SCNC: 142 MMOL/L (ref 135–144)
TOTAL PROTEIN: 6.3 G/DL (ref 6.4–8.3)
WBC # BLD: 3.3 K/UL (ref 3.5–11)
WBC # BLD: ABNORMAL 10*3/UL

## 2019-02-12 PROCEDURE — 99214 OFFICE O/P EST MOD 30 MIN: CPT | Performed by: INTERNAL MEDICINE

## 2019-02-12 PROCEDURE — 96413 CHEMO IV INFUSION 1 HR: CPT

## 2019-02-12 PROCEDURE — 96368 THER/DIAG CONCURRENT INF: CPT

## 2019-02-12 PROCEDURE — 96367 TX/PROPH/DG ADDL SEQ IV INF: CPT

## 2019-02-12 PROCEDURE — 6360000002 HC RX W HCPCS: Performed by: INTERNAL MEDICINE

## 2019-02-12 PROCEDURE — 2580000003 HC RX 258: Performed by: INTERNAL MEDICINE

## 2019-02-12 PROCEDURE — 80053 COMPREHEN METABOLIC PANEL: CPT

## 2019-02-12 PROCEDURE — 96416 CHEMO PROLONG INFUSE W/PUMP: CPT

## 2019-02-12 PROCEDURE — 96375 TX/PRO/DX INJ NEW DRUG ADDON: CPT

## 2019-02-12 PROCEDURE — 36591 DRAW BLOOD OFF VENOUS DEVICE: CPT

## 2019-02-12 PROCEDURE — 96415 CHEMO IV INFUSION ADDL HR: CPT

## 2019-02-12 PROCEDURE — 85025 COMPLETE CBC W/AUTO DIFF WBC: CPT

## 2019-02-12 RX ORDER — SODIUM CHLORIDE 0.9 % (FLUSH) 0.9 %
10 SYRINGE (ML) INJECTION PRN
Status: DISCONTINUED | OUTPATIENT
Start: 2019-02-12 | End: 2019-02-13 | Stop reason: HOSPADM

## 2019-02-12 RX ORDER — PALONOSETRON 0.05 MG/ML
0.25 INJECTION, SOLUTION INTRAVENOUS ONCE
Status: COMPLETED | OUTPATIENT
Start: 2019-02-12 | End: 2019-02-12

## 2019-02-12 RX ORDER — LORAZEPAM 0.5 MG/1
0.5 TABLET ORAL EVERY 8 HOURS PRN
Qty: 60 TABLET | Refills: 0 | Status: SHIPPED | OUTPATIENT
Start: 2019-02-12 | End: 2019-03-14

## 2019-02-12 RX ORDER — SODIUM CHLORIDE 9 MG/ML
INJECTION, SOLUTION INTRAVENOUS ONCE
Status: DISCONTINUED | OUTPATIENT
Start: 2019-02-12 | End: 2019-02-13 | Stop reason: HOSPADM

## 2019-02-12 RX ORDER — OXYCODONE HYDROCHLORIDE 5 MG/1
5 CAPSULE ORAL EVERY 4 HOURS PRN
Qty: 120 CAPSULE | Refills: 0 | Status: CANCELLED | OUTPATIENT
Start: 2019-02-12 | End: 2019-03-14

## 2019-02-12 RX ORDER — OXYCODONE HYDROCHLORIDE 10 MG/1
10 TABLET ORAL EVERY 6 HOURS PRN
Qty: 120 TABLET | Refills: 0 | Status: SHIPPED | OUTPATIENT
Start: 2019-02-12 | End: 2019-03-19 | Stop reason: SDUPTHER

## 2019-02-12 RX ORDER — ASPIRIN 81 MG/1
81 TABLET, CHEWABLE ORAL DAILY
COMMUNITY

## 2019-02-12 RX ORDER — ATROPINE SULFATE 0.4 MG/ML
0.4 AMPUL (ML) INJECTION
Status: COMPLETED | OUTPATIENT
Start: 2019-02-12 | End: 2019-02-12

## 2019-02-12 RX ORDER — DEXTROSE MONOHYDRATE 50 MG/ML
INJECTION, SOLUTION INTRAVENOUS ONCE
Status: COMPLETED | OUTPATIENT
Start: 2019-02-12 | End: 2019-02-12

## 2019-02-12 RX ORDER — HEPARIN SODIUM (PORCINE) LOCK FLUSH IV SOLN 100 UNIT/ML 100 UNIT/ML
500 SOLUTION INTRAVENOUS PRN
Status: DISCONTINUED | OUTPATIENT
Start: 2019-02-12 | End: 2019-02-13 | Stop reason: HOSPADM

## 2019-02-12 RX ORDER — DEXAMETHASONE SODIUM PHOSPHATE 10 MG/ML
10 INJECTION, SOLUTION INTRAMUSCULAR; INTRAVENOUS ONCE
Status: COMPLETED | OUTPATIENT
Start: 2019-02-12 | End: 2019-02-12

## 2019-02-12 RX ADMIN — DEXAMETHASONE SODIUM PHOSPHATE 10 MG: 10 INJECTION, SOLUTION INTRAMUSCULAR; INTRAVENOUS at 09:37

## 2019-02-12 RX ADMIN — ATROPINE SULFATE 0.4 MG: 0.4 INJECTION, SOLUTION INTRAMUSCULAR; INTRAVENOUS; SUBCUTANEOUS at 10:19

## 2019-02-12 RX ADMIN — DEXTROSE MONOHYDRATE: 50 INJECTION, SOLUTION INTRAVENOUS at 09:35

## 2019-02-12 RX ADMIN — LEUCOVORIN CALCIUM 600 MG: 200 INJECTION, POWDER, LYOPHILIZED, FOR SOLUTION INTRAMUSCULAR; INTRAVENOUS at 10:20

## 2019-02-12 RX ADMIN — Medication 10 ML: at 09:25

## 2019-02-12 RX ADMIN — IRINOTECAN HYDROCHLORIDE 280 MG: 40 INJECTION, SOLUTION INTRAVENOUS at 10:20

## 2019-02-12 RX ADMIN — PALONOSETRON 0.25 MG: 0.05 INJECTION, SOLUTION INTRAVENOUS at 09:35

## 2019-02-12 RX ADMIN — Medication 10 ML: at 12:05

## 2019-02-12 RX ADMIN — FLUOROURACIL 3750 MG: 50 INJECTION, SOLUTION INTRAVENOUS at 12:06

## 2019-02-12 RX ADMIN — SODIUM CHLORIDE 150 MG: 900 INJECTION, SOLUTION INTRAVENOUS at 09:45

## 2019-02-12 NOTE — PROGRESS NOTES
Pt here for C.4D.1 Irinotecan, Leucovorin, 5Fu. Pt seen by Dr Jen Andrade at chair side for follow up, refer to his note. Labs drawn from port and results reviewed. Pt was treated without incident and d/c'd in stable condition. Pt will return in 2 days for pump dc.

## 2019-02-13 ENCOUNTER — TELEPHONE (OUTPATIENT)
Dept: ONCOLOGY | Age: 59
End: 2019-02-13

## 2019-02-14 ENCOUNTER — HOSPITAL ENCOUNTER (OUTPATIENT)
Dept: INFUSION THERAPY | Age: 59
Discharge: HOME | End: 2019-02-14
Payer: COMMERCIAL

## 2019-02-14 VITALS
TEMPERATURE: 98.2 F | HEART RATE: 98 BPM | SYSTOLIC BLOOD PRESSURE: 161 MMHG | DIASTOLIC BLOOD PRESSURE: 104 MMHG | RESPIRATION RATE: 16 BRPM

## 2019-02-14 DIAGNOSIS — C25.1 MALIGNANT NEOPLASM OF BODY OF PANCREAS (HCC): ICD-10-CM

## 2019-02-14 PROCEDURE — 96375 TX/PRO/DX INJ NEW DRUG ADDON: CPT

## 2019-02-14 PROCEDURE — 96360 HYDRATION IV INFUSION INIT: CPT

## 2019-02-14 PROCEDURE — 2580000003 HC RX 258: Performed by: INTERNAL MEDICINE

## 2019-02-14 PROCEDURE — 96361 HYDRATE IV INFUSION ADD-ON: CPT

## 2019-02-14 PROCEDURE — 6360000002 HC RX W HCPCS: Performed by: INTERNAL MEDICINE

## 2019-02-14 PROCEDURE — 96372 THER/PROPH/DIAG INJ SC/IM: CPT

## 2019-02-14 RX ORDER — PROMETHAZINE HYDROCHLORIDE 12.5 MG/1
12.5 TABLET ORAL EVERY 6 HOURS PRN
Qty: 120 TABLET | Refills: 2 | Status: ON HOLD | OUTPATIENT
Start: 2019-02-14 | End: 2019-07-21 | Stop reason: SDUPTHER

## 2019-02-14 RX ORDER — PROCHLORPERAZINE MALEATE 10 MG
10 TABLET ORAL EVERY 6 HOURS PRN
Qty: 120 TABLET | Refills: 2 | Status: SHIPPED | OUTPATIENT
Start: 2019-02-14

## 2019-02-14 RX ORDER — HEPARIN SODIUM (PORCINE) LOCK FLUSH IV SOLN 100 UNIT/ML 100 UNIT/ML
500 SOLUTION INTRAVENOUS PRN
Status: CANCELLED | OUTPATIENT
Start: 2019-02-14

## 2019-02-14 RX ORDER — SODIUM CHLORIDE 0.9 % (FLUSH) 0.9 %
20 SYRINGE (ML) INJECTION PRN
Status: CANCELLED | OUTPATIENT
Start: 2019-02-14

## 2019-02-14 RX ORDER — PROMETHAZINE HYDROCHLORIDE 25 MG/ML
12.5 INJECTION, SOLUTION INTRAMUSCULAR; INTRAVENOUS ONCE
Status: COMPLETED | OUTPATIENT
Start: 2019-02-14 | End: 2019-02-14

## 2019-02-14 RX ORDER — SODIUM CHLORIDE 0.9 % (FLUSH) 0.9 %
10 SYRINGE (ML) INJECTION PRN
Status: DISCONTINUED | OUTPATIENT
Start: 2019-02-14 | End: 2019-02-15 | Stop reason: HOSPADM

## 2019-02-14 RX ORDER — HEPARIN SODIUM (PORCINE) LOCK FLUSH IV SOLN 100 UNIT/ML 100 UNIT/ML
500 SOLUTION INTRAVENOUS PRN
Status: DISCONTINUED | OUTPATIENT
Start: 2019-02-14 | End: 2019-02-15 | Stop reason: HOSPADM

## 2019-02-14 RX ORDER — 0.9 % SODIUM CHLORIDE 0.9 %
1000 INTRAVENOUS SOLUTION INTRAVENOUS ONCE
Status: COMPLETED | OUTPATIENT
Start: 2019-02-14 | End: 2019-02-14

## 2019-02-14 RX ORDER — SODIUM CHLORIDE 0.9 % (FLUSH) 0.9 %
10 SYRINGE (ML) INJECTION PRN
Status: CANCELLED | OUTPATIENT
Start: 2019-02-14

## 2019-02-14 RX ADMIN — PEGFILGRASTIM 6 MG: 6 INJECTION SUBCUTANEOUS at 15:41

## 2019-02-14 RX ADMIN — SODIUM CHLORIDE 1000 ML: 9 INJECTION, SOLUTION INTRAVENOUS at 13:50

## 2019-02-14 RX ADMIN — SODIUM CHLORIDE, PRESERVATIVE FREE 500 UNITS: 5 INJECTION INTRAVENOUS at 15:40

## 2019-02-14 RX ADMIN — Medication 10 ML: at 15:40

## 2019-02-14 RX ADMIN — PROMETHAZINE HYDROCHLORIDE 12.5 MG: 25 INJECTION INTRAMUSCULAR; INTRAVENOUS at 14:02

## 2019-02-14 NOTE — PROGRESS NOTES
Pt is here for CADD pump d/c, presents with N/V. Pt states she has felt this way since she received chemo 2 days ago. Eugene RN spoke with Dr. Ja Joseph orders received for phenergan 12.5 mg IV and 1 L NS. Pt felt better and was discharged in stable condition. Pt will return on 2/26 for C5D1 and office visit.

## 2019-02-14 NOTE — PLAN OF CARE
Problem:  Bowel/Gastric:  Goal: Occurrences of nausea will decrease  Occurrences of nausea will decrease  Outcome: Met This Shift

## 2019-02-14 NOTE — PROGRESS NOTES
See note from [facility] from today. Pt reports relief of nausea after IV Phenergan. Dr. Mir Majano ordered script for oral Phenergan at home and also to increase oral Compazine from 5mg to 10mg. Scripts were escribed to [facility] in [place], [place]. Pt advised to alternate Phenergan and Compazine around the clock and increase oral fluids with Gatorade/Powerade and supplement with Boost/Ensure. Pt also informed that her script for Ativan can be used for nausea if needed as well.

## 2019-02-25 ENCOUNTER — TELEPHONE (OUTPATIENT)
Dept: ONCOLOGY | Age: 59
End: 2019-02-25

## 2019-02-26 ENCOUNTER — TELEPHONE (OUTPATIENT)
Dept: ONCOLOGY | Age: 59
End: 2019-02-26

## 2019-02-26 ENCOUNTER — INITIAL CONSULT (OUTPATIENT)
Dept: ONCOLOGY | Age: 59
End: 2019-02-26
Payer: COMMERCIAL

## 2019-02-26 ENCOUNTER — OFFICE VISIT (OUTPATIENT)
Dept: ONCOLOGY | Age: 59
End: 2019-02-26
Payer: COMMERCIAL

## 2019-02-26 ENCOUNTER — HOSPITAL ENCOUNTER (OUTPATIENT)
Dept: INFUSION THERAPY | Age: 59
Discharge: HOME | End: 2019-02-26
Payer: COMMERCIAL

## 2019-02-26 VITALS
TEMPERATURE: 98.2 F | WEIGHT: 114.5 LBS | SYSTOLIC BLOOD PRESSURE: 114 MMHG | DIASTOLIC BLOOD PRESSURE: 74 MMHG | BODY MASS INDEX: 19.65 KG/M2 | RESPIRATION RATE: 16 BRPM

## 2019-02-26 DIAGNOSIS — G95.11 SPINAL CORD INFARCTION (HCC): ICD-10-CM

## 2019-02-26 DIAGNOSIS — R29.898 WEAKNESS OF BOTH LOWER EXTREMITIES: ICD-10-CM

## 2019-02-26 DIAGNOSIS — Z80.41 FAMILY HISTORY OF OVARIAN CANCER: ICD-10-CM

## 2019-02-26 DIAGNOSIS — C25.1 MALIGNANT NEOPLASM OF BODY OF PANCREAS (HCC): Primary | ICD-10-CM

## 2019-02-26 DIAGNOSIS — C25.1 MALIGNANT NEOPLASM OF BODY OF PANCREAS (HCC): ICD-10-CM

## 2019-02-26 DIAGNOSIS — Z80.3 FAMILY HISTORY OF BREAST CANCER: ICD-10-CM

## 2019-02-26 LAB
-: ABNORMAL
ABSOLUTE EOS #: 0.18 K/UL (ref 0–0.4)
ABSOLUTE IMMATURE GRANULOCYTE: ABNORMAL K/UL (ref 0–0.3)
ABSOLUTE LYMPH #: 0.82 K/UL (ref 1–4.8)
ABSOLUTE MONO #: 0.27 K/UL (ref 0.1–0.8)
ALBUMIN SERPL-MCNC: 3.5 G/DL (ref 3.5–5.2)
ALBUMIN/GLOBULIN RATIO: 1.3 (ref 1–2.5)
ALP BLD-CCNC: 78 U/L (ref 35–104)
ALT SERPL-CCNC: 13 U/L (ref 5–33)
AMORPHOUS: ABNORMAL
ANION GAP SERPL CALCULATED.3IONS-SCNC: 9 MMOL/L (ref 9–17)
AST SERPL-CCNC: 17 U/L
BACTERIA: ABNORMAL
BASOPHILS # BLD: 1 % (ref 0–2)
BASOPHILS ABSOLUTE: 0.09 K/UL (ref 0–0.2)
BILIRUB SERPL-MCNC: 0.2 MG/DL (ref 0.3–1.2)
BILIRUBIN URINE: NEGATIVE
BUN BLDV-MCNC: 14 MG/DL (ref 6–20)
BUN/CREAT BLD: ABNORMAL (ref 9–20)
CA 19-9: 1821 U/ML (ref 0–35)
CALCIUM SERPL-MCNC: 9.5 MG/DL (ref 8.6–10.4)
CASTS UA: ABNORMAL /LPF
CHLORIDE BLD-SCNC: 102 MMOL/L (ref 98–107)
CO2: 27 MMOL/L (ref 20–31)
COLOR: YELLOW
COMMENT UA: ABNORMAL
CREAT SERPL-MCNC: 0.54 MG/DL (ref 0.5–0.9)
CRYSTALS, UA: ABNORMAL /HPF
DIFFERENTIAL TYPE: ABNORMAL
EOSINOPHILS RELATIVE PERCENT: 2 % (ref 1–4)
EPITHELIAL CELLS UA: ABNORMAL /HPF (ref 0–5)
GFR AFRICAN AMERICAN: >60 ML/MIN
GFR NON-AFRICAN AMERICAN: >60 ML/MIN
GFR SERPL CREATININE-BSD FRML MDRD: ABNORMAL ML/MIN/{1.73_M2}
GFR SERPL CREATININE-BSD FRML MDRD: ABNORMAL ML/MIN/{1.73_M2}
GLUCOSE BLD-MCNC: 124 MG/DL (ref 70–99)
GLUCOSE URINE: NEGATIVE
HCT VFR BLD CALC: 33.2 % (ref 36–46)
HEMOGLOBIN: 10.5 G/DL (ref 12–16)
IMMATURE GRANULOCYTES: ABNORMAL %
KETONES, URINE: NEGATIVE
LEUKOCYTE ESTERASE, URINE: ABNORMAL
LYMPHOCYTES # BLD: 9 % (ref 24–44)
MCH RBC QN AUTO: 30.6 PG (ref 26–34)
MCHC RBC AUTO-ENTMCNC: 31.6 G/DL (ref 31–37)
MCV RBC AUTO: 96.8 FL (ref 80–100)
MONOCYTES # BLD: 3 % (ref 1–7)
MORPHOLOGY: NORMAL
MUCUS: ABNORMAL
NITRITE, URINE: POSITIVE
NRBC AUTOMATED: ABNORMAL PER 100 WBC
OTHER OBSERVATIONS UA: ABNORMAL
PDW BLD-RTO: 16.1 % (ref 12.5–15.4)
PH UA: 6.5 (ref 5–8)
PLATELET # BLD: 207 K/UL (ref 140–450)
PLATELET ESTIMATE: ABNORMAL
PMV BLD AUTO: 10.4 FL (ref 8–14)
POTASSIUM SERPL-SCNC: 4 MMOL/L (ref 3.7–5.3)
PROTEIN UA: ABNORMAL
RBC # BLD: 3.43 M/UL (ref 4–5.2)
RBC # BLD: ABNORMAL 10*6/UL
RBC UA: ABNORMAL /HPF (ref 0–2)
RENAL EPITHELIAL, UA: ABNORMAL /HPF
SEG NEUTROPHILS: 85 % (ref 36–66)
SEGMENTED NEUTROPHILS ABSOLUTE COUNT: 7.74 K/UL (ref 1.8–7.7)
SODIUM BLD-SCNC: 138 MMOL/L (ref 135–144)
SPECIFIC GRAVITY UA: 1.02 (ref 1–1.03)
TOTAL PROTEIN: 6.1 G/DL (ref 6.4–8.3)
TRICHOMONAS: ABNORMAL
TURBIDITY: ABNORMAL
URINE HGB: ABNORMAL
UROBILINOGEN, URINE: NORMAL
WBC # BLD: 9.1 K/UL (ref 3.5–11)
WBC # BLD: ABNORMAL 10*3/UL
WBC UA: ABNORMAL /HPF (ref 0–5)
YEAST: ABNORMAL

## 2019-02-26 PROCEDURE — 80053 COMPREHEN METABOLIC PANEL: CPT

## 2019-02-26 PROCEDURE — 36591 DRAW BLOOD OFF VENOUS DEVICE: CPT

## 2019-02-26 PROCEDURE — 96375 TX/PRO/DX INJ NEW DRUG ADDON: CPT

## 2019-02-26 PROCEDURE — 96368 THER/DIAG CONCURRENT INF: CPT

## 2019-02-26 PROCEDURE — 2580000003 HC RX 258: Performed by: INTERNAL MEDICINE

## 2019-02-26 PROCEDURE — 87086 URINE CULTURE/COLONY COUNT: CPT

## 2019-02-26 PROCEDURE — 86403 PARTICLE AGGLUT ANTBDY SCRN: CPT

## 2019-02-26 PROCEDURE — 81001 URINALYSIS AUTO W/SCOPE: CPT

## 2019-02-26 PROCEDURE — 6360000002 HC RX W HCPCS: Performed by: INTERNAL MEDICINE

## 2019-02-26 PROCEDURE — 86301 IMMUNOASSAY TUMOR CA 19-9: CPT

## 2019-02-26 PROCEDURE — 96040 PR GENETIC COUNSELING, EACH 30 MIN: CPT | Performed by: GENETIC COUNSELOR, MS

## 2019-02-26 PROCEDURE — 87186 SC STD MICRODIL/AGAR DIL: CPT

## 2019-02-26 PROCEDURE — 96413 CHEMO IV INFUSION 1 HR: CPT

## 2019-02-26 PROCEDURE — 96415 CHEMO IV INFUSION ADDL HR: CPT

## 2019-02-26 PROCEDURE — 96367 TX/PROPH/DG ADDL SEQ IV INF: CPT

## 2019-02-26 PROCEDURE — 96416 CHEMO PROLONG INFUSE W/PUMP: CPT

## 2019-02-26 PROCEDURE — 85025 COMPLETE CBC W/AUTO DIFF WBC: CPT

## 2019-02-26 PROCEDURE — 99214 OFFICE O/P EST MOD 30 MIN: CPT | Performed by: INTERNAL MEDICINE

## 2019-02-26 RX ORDER — ATROPINE SULFATE 0.4 MG/ML
0.4 AMPUL (ML) INJECTION
Status: COMPLETED | OUTPATIENT
Start: 2019-02-26 | End: 2019-02-26

## 2019-02-26 RX ORDER — SODIUM CHLORIDE 9 MG/ML
INJECTION, SOLUTION INTRAVENOUS ONCE
Status: DISCONTINUED | OUTPATIENT
Start: 2019-02-26 | End: 2019-02-27 | Stop reason: HOSPADM

## 2019-02-26 RX ORDER — HEPARIN SODIUM (PORCINE) LOCK FLUSH IV SOLN 100 UNIT/ML 100 UNIT/ML
500 SOLUTION INTRAVENOUS PRN
Status: DISCONTINUED | OUTPATIENT
Start: 2019-02-26 | End: 2019-02-27 | Stop reason: HOSPADM

## 2019-02-26 RX ORDER — DEXTROSE MONOHYDRATE 50 MG/ML
INJECTION, SOLUTION INTRAVENOUS ONCE
Status: DISCONTINUED | OUTPATIENT
Start: 2019-02-26 | End: 2019-02-27 | Stop reason: HOSPADM

## 2019-02-26 RX ORDER — SODIUM CHLORIDE 0.9 % (FLUSH) 0.9 %
10 SYRINGE (ML) INJECTION PRN
Status: DISCONTINUED | OUTPATIENT
Start: 2019-02-26 | End: 2019-02-27 | Stop reason: HOSPADM

## 2019-02-26 RX ORDER — DEXAMETHASONE SODIUM PHOSPHATE 10 MG/ML
10 INJECTION, SOLUTION INTRAMUSCULAR; INTRAVENOUS ONCE
Status: COMPLETED | OUTPATIENT
Start: 2019-02-26 | End: 2019-02-26

## 2019-02-26 RX ORDER — PALONOSETRON 0.05 MG/ML
0.25 INJECTION, SOLUTION INTRAVENOUS ONCE
Status: COMPLETED | OUTPATIENT
Start: 2019-02-26 | End: 2019-02-26

## 2019-02-26 RX ADMIN — Medication 10 ML: at 10:02

## 2019-02-26 RX ADMIN — LEUCOVORIN CALCIUM 600 MG: 200 INJECTION, POWDER, LYOPHILIZED, FOR SOLUTION INTRAMUSCULAR; INTRAVENOUS at 12:11

## 2019-02-26 RX ADMIN — DEXTROSE MONOHYDRATE: 50 INJECTION, SOLUTION INTRAVENOUS at 11:09

## 2019-02-26 RX ADMIN — DEXAMETHASONE SODIUM PHOSPHATE 10 MG: 10 INJECTION, SOLUTION INTRAMUSCULAR; INTRAVENOUS at 11:12

## 2019-02-26 RX ADMIN — Medication 10 ML: at 13:50

## 2019-02-26 RX ADMIN — PALONOSETRON 0.25 MG: 0.05 INJECTION, SOLUTION INTRAVENOUS at 11:10

## 2019-02-26 RX ADMIN — Medication 10 ML: at 10:03

## 2019-02-26 RX ADMIN — IRINOTECAN HYDROCHLORIDE 280 MG: 40 INJECTION, SOLUTION INTRAVENOUS at 12:11

## 2019-02-26 RX ADMIN — ATROPINE SULFATE 0.4 MG: 0.4 INJECTION, SOLUTION INTRAMUSCULAR; INTRAVENOUS; SUBCUTANEOUS at 12:06

## 2019-02-26 RX ADMIN — FLUOROURACIL 3750 MG: 50 INJECTION, SOLUTION INTRAVENOUS at 13:50

## 2019-02-26 RX ADMIN — SODIUM CHLORIDE 150 MG: 900 INJECTION, SOLUTION INTRAVENOUS at 11:31

## 2019-02-26 NOTE — PROGRESS NOTES
Pt here for C5D1. Denies any new complaints. Labs drawn from port and results reviewed. Pt was seen by Dr. Archana Dave prior to treatment. Urine specimen obtained and sent to lab for ua possible culture. Urine results obtained and Dr. Archana Dave notiifed per Serenity Parrish F.MA. Dr. Archana Dave to send electronic script for antibiotic. Pt informed. Pt was treated without incident and d/c'd in stable condition. Pt returns 2/28/19 for pump d/c.

## 2019-02-27 ENCOUNTER — TELEPHONE (OUTPATIENT)
Dept: ONCOLOGY | Age: 59
End: 2019-02-27

## 2019-02-27 RX ORDER — CIPROFLOXACIN 500 MG/1
500 TABLET, FILM COATED ORAL 2 TIMES DAILY
Qty: 10 TABLET | Refills: 0 | Status: SHIPPED | OUTPATIENT
Start: 2019-02-27 | End: 2019-03-04

## 2019-02-28 ENCOUNTER — HOSPITAL ENCOUNTER (OUTPATIENT)
Dept: INFUSION THERAPY | Age: 59
Discharge: HOME | End: 2019-02-28
Payer: COMMERCIAL

## 2019-02-28 ENCOUNTER — HOSPITAL ENCOUNTER (OUTPATIENT)
Dept: WOUND CARE | Age: 59
Discharge: HOME | End: 2019-02-28
Payer: COMMERCIAL

## 2019-02-28 VITALS
SYSTOLIC BLOOD PRESSURE: 98 MMHG | RESPIRATION RATE: 20 BRPM | DIASTOLIC BLOOD PRESSURE: 62 MMHG | TEMPERATURE: 97.6 F | BODY MASS INDEX: 19.46 KG/M2 | HEART RATE: 101 BPM | HEIGHT: 64 IN | WEIGHT: 114 LBS

## 2019-02-28 DIAGNOSIS — C25.1 MALIGNANT NEOPLASM OF BODY OF PANCREAS (HCC): ICD-10-CM

## 2019-02-28 DIAGNOSIS — E44.0 MODERATE PROTEIN-CALORIE MALNUTRITION (HCC): Primary | ICD-10-CM

## 2019-02-28 DIAGNOSIS — L89.152 PRESSURE ULCER OF COCCYGEAL REGION, STAGE 2 (HCC): ICD-10-CM

## 2019-02-28 DIAGNOSIS — G82.20 PARTIAL PARALYSIS OF BOTH LOWER LIMBS (HCC): ICD-10-CM

## 2019-02-28 DIAGNOSIS — R53.1 WEAKNESS: ICD-10-CM

## 2019-02-28 LAB
CULTURE: ABNORMAL
Lab: ABNORMAL
SPECIMEN DESCRIPTION: ABNORMAL

## 2019-02-28 PROCEDURE — 6360000002 HC RX W HCPCS: Performed by: INTERNAL MEDICINE

## 2019-02-28 PROCEDURE — 96523 IRRIG DRUG DELIVERY DEVICE: CPT

## 2019-02-28 PROCEDURE — 2580000003 HC RX 258: Performed by: INTERNAL MEDICINE

## 2019-02-28 PROCEDURE — 96372 THER/PROPH/DIAG INJ SC/IM: CPT

## 2019-02-28 PROCEDURE — 99202 OFFICE O/P NEW SF 15 MIN: CPT | Performed by: NURSE PRACTITIONER

## 2019-02-28 PROCEDURE — 6370000000 HC RX 637 (ALT 250 FOR IP): Performed by: NURSE PRACTITIONER

## 2019-02-28 PROCEDURE — 11042 DBRDMT SUBQ TIS 1ST 20SQCM/<: CPT | Performed by: NURSE PRACTITIONER

## 2019-02-28 PROCEDURE — 99213 OFFICE O/P EST LOW 20 MIN: CPT

## 2019-02-28 PROCEDURE — 11042 DBRDMT SUBQ TIS 1ST 20SQCM/<: CPT

## 2019-02-28 RX ORDER — SODIUM CHLORIDE 0.9 % (FLUSH) 0.9 %
10 SYRINGE (ML) INJECTION PRN
Status: DISCONTINUED | OUTPATIENT
Start: 2019-02-28 | End: 2019-03-01 | Stop reason: HOSPADM

## 2019-02-28 RX ORDER — HEPARIN SODIUM (PORCINE) LOCK FLUSH IV SOLN 100 UNIT/ML 100 UNIT/ML
500 SOLUTION INTRAVENOUS PRN
Status: CANCELLED | OUTPATIENT
Start: 2019-02-28

## 2019-02-28 RX ORDER — LIDOCAINE HYDROCHLORIDE 40 MG/ML
SOLUTION TOPICAL ONCE
Status: COMPLETED | OUTPATIENT
Start: 2019-02-28 | End: 2019-02-28

## 2019-02-28 RX ORDER — SODIUM CHLORIDE 0.9 % (FLUSH) 0.9 %
10 SYRINGE (ML) INJECTION PRN
Status: CANCELLED | OUTPATIENT
Start: 2019-02-28

## 2019-02-28 RX ORDER — HEPARIN SODIUM (PORCINE) LOCK FLUSH IV SOLN 100 UNIT/ML 100 UNIT/ML
500 SOLUTION INTRAVENOUS PRN
Status: DISCONTINUED | OUTPATIENT
Start: 2019-02-28 | End: 2019-03-01 | Stop reason: HOSPADM

## 2019-02-28 RX ORDER — SODIUM CHLORIDE 0.9 % (FLUSH) 0.9 %
20 SYRINGE (ML) INJECTION PRN
Status: CANCELLED | OUTPATIENT
Start: 2019-02-28

## 2019-02-28 RX ADMIN — Medication 500 UNITS: at 12:52

## 2019-02-28 RX ADMIN — PEGFILGRASTIM 6 MG: 6 INJECTION SUBCUTANEOUS at 13:09

## 2019-02-28 RX ADMIN — Medication 10 ML: at 12:52

## 2019-02-28 RX ADMIN — LIDOCAINE HYDROCHLORIDE 5 ML: 40 SOLUTION TOPICAL at 10:55

## 2019-02-28 ASSESSMENT — PAIN SCALES - GENERAL: PAINLEVEL_OUTOF10: 0

## 2019-02-28 NOTE — PROGRESS NOTES
Pt is here for CADD pump d/c and neulasta injection. Denies any problems at this time. Pt was discharged in stable condition and will return on 3/12 for office visit & C6D1.

## 2019-03-01 ENCOUNTER — TELEPHONE (OUTPATIENT)
Dept: ONCOLOGY | Age: 59
End: 2019-03-01

## 2019-03-05 ENCOUNTER — HOSPITAL ENCOUNTER (OUTPATIENT)
Dept: WOUND CARE | Age: 59
Discharge: HOME | End: 2019-03-05
Payer: COMMERCIAL

## 2019-03-05 VITALS
TEMPERATURE: 97.6 F | HEIGHT: 64 IN | SYSTOLIC BLOOD PRESSURE: 104 MMHG | BODY MASS INDEX: 19.46 KG/M2 | RESPIRATION RATE: 18 BRPM | WEIGHT: 114 LBS | HEART RATE: 103 BPM | DIASTOLIC BLOOD PRESSURE: 70 MMHG

## 2019-03-05 DIAGNOSIS — L89.152 PRESSURE ULCER OF COCCYGEAL REGION, STAGE 2 (HCC): Primary | ICD-10-CM

## 2019-03-05 PROCEDURE — 11042 DBRDMT SUBQ TIS 1ST 20SQCM/<: CPT | Performed by: NURSE PRACTITIONER

## 2019-03-05 PROCEDURE — 11042 DBRDMT SUBQ TIS 1ST 20SQCM/<: CPT

## 2019-03-05 PROCEDURE — 6370000000 HC RX 637 (ALT 250 FOR IP): Performed by: NURSE PRACTITIONER

## 2019-03-05 RX ORDER — LIDOCAINE HYDROCHLORIDE 40 MG/ML
SOLUTION TOPICAL ONCE
Status: COMPLETED | OUTPATIENT
Start: 2019-03-05 | End: 2019-03-05

## 2019-03-05 RX ADMIN — LIDOCAINE HYDROCHLORIDE: 40 SOLUTION TOPICAL at 13:15

## 2019-03-05 ASSESSMENT — PAIN SCALES - GENERAL: PAINLEVEL_OUTOF10: 0

## 2019-03-06 ENCOUNTER — TELEPHONE (OUTPATIENT)
Dept: ONCOLOGY | Age: 59
End: 2019-03-06

## 2019-03-12 ENCOUNTER — HOSPITAL ENCOUNTER (OUTPATIENT)
Dept: INFUSION THERAPY | Age: 59
Discharge: HOME | End: 2019-03-12
Payer: COMMERCIAL

## 2019-03-12 ENCOUNTER — TELEPHONE (OUTPATIENT)
Dept: ONCOLOGY | Age: 59
End: 2019-03-12

## 2019-03-12 ENCOUNTER — OFFICE VISIT (OUTPATIENT)
Dept: ONCOLOGY | Age: 59
End: 2019-03-12
Payer: COMMERCIAL

## 2019-03-12 VITALS
WEIGHT: 115 LBS | BODY MASS INDEX: 19.74 KG/M2 | HEART RATE: 100 BPM | TEMPERATURE: 98.2 F | SYSTOLIC BLOOD PRESSURE: 118 MMHG | DIASTOLIC BLOOD PRESSURE: 83 MMHG

## 2019-03-12 DIAGNOSIS — G95.11 SPINAL CORD INFARCTION (HCC): ICD-10-CM

## 2019-03-12 DIAGNOSIS — R29.898 WEAKNESS OF BOTH LOWER EXTREMITIES: ICD-10-CM

## 2019-03-12 DIAGNOSIS — M79.89 LEG SWELLING: ICD-10-CM

## 2019-03-12 DIAGNOSIS — C25.1 MALIGNANT NEOPLASM OF BODY OF PANCREAS (HCC): Primary | ICD-10-CM

## 2019-03-12 LAB
ABSOLUTE EOS #: 0.62 K/UL (ref 0–0.4)
ABSOLUTE IMMATURE GRANULOCYTE: ABNORMAL K/UL (ref 0–0.3)
ABSOLUTE LYMPH #: 1.23 K/UL (ref 1–4.8)
ABSOLUTE MONO #: 0.92 K/UL (ref 0.1–0.8)
ALBUMIN SERPL-MCNC: 3.5 G/DL (ref 3.5–5.2)
ALBUMIN/GLOBULIN RATIO: 1.3 (ref 1–2.5)
ALP BLD-CCNC: 102 U/L (ref 35–104)
ALT SERPL-CCNC: 15 U/L (ref 5–33)
ANION GAP SERPL CALCULATED.3IONS-SCNC: 12 MMOL/L (ref 9–17)
AST SERPL-CCNC: 23 U/L
BASOPHILS # BLD: 0 % (ref 0–2)
BASOPHILS ABSOLUTE: 0 K/UL (ref 0–0.2)
BILIRUB SERPL-MCNC: 0.15 MG/DL (ref 0.3–1.2)
BUN BLDV-MCNC: 11 MG/DL (ref 6–20)
BUN/CREAT BLD: ABNORMAL (ref 9–20)
CALCIUM SERPL-MCNC: 9.6 MG/DL (ref 8.6–10.4)
CHLORIDE BLD-SCNC: 105 MMOL/L (ref 98–107)
CO2: 26 MMOL/L (ref 20–31)
CREAT SERPL-MCNC: 0.59 MG/DL (ref 0.5–0.9)
DIFFERENTIAL TYPE: ABNORMAL
EOSINOPHILS RELATIVE PERCENT: 4 % (ref 1–4)
GFR AFRICAN AMERICAN: >60 ML/MIN
GFR NON-AFRICAN AMERICAN: >60 ML/MIN
GFR SERPL CREATININE-BSD FRML MDRD: ABNORMAL ML/MIN/{1.73_M2}
GFR SERPL CREATININE-BSD FRML MDRD: ABNORMAL ML/MIN/{1.73_M2}
GLUCOSE BLD-MCNC: 112 MG/DL (ref 70–99)
HCT VFR BLD CALC: 31.3 % (ref 36–46)
HEMOGLOBIN: 10.6 G/DL (ref 12–16)
IMMATURE GRANULOCYTES: ABNORMAL %
LYMPHOCYTES # BLD: 8 % (ref 24–44)
MCH RBC QN AUTO: 31.6 PG (ref 26–34)
MCHC RBC AUTO-ENTMCNC: 34 G/DL (ref 31–37)
MCV RBC AUTO: 93.2 FL (ref 80–100)
MONOCYTES # BLD: 6 % (ref 1–7)
MORPHOLOGY: ABNORMAL
NRBC AUTOMATED: ABNORMAL PER 100 WBC
PDW BLD-RTO: 17.6 % (ref 12.5–15.4)
PLATELET # BLD: 236 K/UL (ref 140–450)
PLATELET ESTIMATE: ABNORMAL
PMV BLD AUTO: 8.7 FL (ref 6–12)
POTASSIUM SERPL-SCNC: 4.2 MMOL/L (ref 3.7–5.3)
RBC # BLD: 3.35 M/UL (ref 4–5.2)
RBC # BLD: ABNORMAL 10*6/UL
SEG NEUTROPHILS: 82 % (ref 36–66)
SEGMENTED NEUTROPHILS ABSOLUTE COUNT: 12.63 K/UL (ref 1.8–7.7)
SODIUM BLD-SCNC: 143 MMOL/L (ref 135–144)
TOTAL PROTEIN: 6.3 G/DL (ref 6.4–8.3)
WBC # BLD: 15.4 K/UL (ref 3.5–11)
WBC # BLD: ABNORMAL 10*3/UL

## 2019-03-12 PROCEDURE — 96413 CHEMO IV INFUSION 1 HR: CPT

## 2019-03-12 PROCEDURE — 6360000002 HC RX W HCPCS: Performed by: INTERNAL MEDICINE

## 2019-03-12 PROCEDURE — 96368 THER/DIAG CONCURRENT INF: CPT

## 2019-03-12 PROCEDURE — 96416 CHEMO PROLONG INFUSE W/PUMP: CPT

## 2019-03-12 PROCEDURE — 96375 TX/PRO/DX INJ NEW DRUG ADDON: CPT

## 2019-03-12 PROCEDURE — 85025 COMPLETE CBC W/AUTO DIFF WBC: CPT

## 2019-03-12 PROCEDURE — 80053 COMPREHEN METABOLIC PANEL: CPT

## 2019-03-12 PROCEDURE — 96367 TX/PROPH/DG ADDL SEQ IV INF: CPT

## 2019-03-12 PROCEDURE — 36591 DRAW BLOOD OFF VENOUS DEVICE: CPT

## 2019-03-12 PROCEDURE — 96417 CHEMO IV INFUS EACH ADDL SEQ: CPT

## 2019-03-12 PROCEDURE — 96415 CHEMO IV INFUSION ADDL HR: CPT

## 2019-03-12 PROCEDURE — 2580000003 HC RX 258: Performed by: INTERNAL MEDICINE

## 2019-03-12 PROCEDURE — 99214 OFFICE O/P EST MOD 30 MIN: CPT | Performed by: INTERNAL MEDICINE

## 2019-03-12 RX ORDER — SODIUM CHLORIDE 0.9 % (FLUSH) 0.9 %
5 SYRINGE (ML) INJECTION PRN
Status: CANCELLED | OUTPATIENT
Start: 2019-04-09

## 2019-03-12 RX ORDER — 0.9 % SODIUM CHLORIDE 0.9 %
10 VIAL (ML) INJECTION ONCE
Status: CANCELLED | OUTPATIENT
Start: 2019-04-09 | End: 2019-04-09

## 2019-03-12 RX ORDER — SODIUM CHLORIDE 9 MG/ML
INJECTION, SOLUTION INTRAVENOUS ONCE
Status: CANCELLED | OUTPATIENT
Start: 2019-03-26 | End: 2019-03-26

## 2019-03-12 RX ORDER — SODIUM CHLORIDE 9 MG/ML
INJECTION, SOLUTION INTRAVENOUS ONCE
Status: CANCELLED | OUTPATIENT
Start: 2019-03-12 | End: 2019-03-12

## 2019-03-12 RX ORDER — SODIUM CHLORIDE 0.9 % (FLUSH) 0.9 %
5 SYRINGE (ML) INJECTION PRN
Status: CANCELLED | OUTPATIENT
Start: 2019-03-12

## 2019-03-12 RX ORDER — HEPARIN SODIUM (PORCINE) LOCK FLUSH IV SOLN 100 UNIT/ML 100 UNIT/ML
500 SOLUTION INTRAVENOUS PRN
Status: CANCELLED | OUTPATIENT
Start: 2019-04-09

## 2019-03-12 RX ORDER — METHYLPREDNISOLONE SODIUM SUCCINATE 125 MG/2ML
125 INJECTION, POWDER, LYOPHILIZED, FOR SOLUTION INTRAMUSCULAR; INTRAVENOUS ONCE
Status: CANCELLED | OUTPATIENT
Start: 2019-04-09 | End: 2019-04-09

## 2019-03-12 RX ORDER — ATROPINE SULFATE 0.4 MG/ML
0.4 AMPUL (ML) INJECTION
Status: COMPLETED | OUTPATIENT
Start: 2019-03-12 | End: 2019-03-12

## 2019-03-12 RX ORDER — METHYLPREDNISOLONE SODIUM SUCCINATE 125 MG/2ML
125 INJECTION, POWDER, LYOPHILIZED, FOR SOLUTION INTRAMUSCULAR; INTRAVENOUS ONCE
Status: CANCELLED | OUTPATIENT
Start: 2019-03-26 | End: 2019-03-26

## 2019-03-12 RX ORDER — SODIUM CHLORIDE 0.9 % (FLUSH) 0.9 %
10 SYRINGE (ML) INJECTION PRN
Status: CANCELLED | OUTPATIENT
Start: 2019-03-12

## 2019-03-12 RX ORDER — DEXTROSE MONOHYDRATE 50 MG/ML
INJECTION, SOLUTION INTRAVENOUS ONCE
Status: CANCELLED | OUTPATIENT
Start: 2019-04-09 | End: 2019-04-09

## 2019-03-12 RX ORDER — SODIUM CHLORIDE 0.9 % (FLUSH) 0.9 %
5 SYRINGE (ML) INJECTION PRN
Status: CANCELLED | OUTPATIENT
Start: 2019-03-26

## 2019-03-12 RX ORDER — HEPARIN SODIUM (PORCINE) LOCK FLUSH IV SOLN 100 UNIT/ML 100 UNIT/ML
500 SOLUTION INTRAVENOUS PRN
Status: CANCELLED | OUTPATIENT
Start: 2019-03-12

## 2019-03-12 RX ORDER — PALONOSETRON 0.05 MG/ML
0.25 INJECTION, SOLUTION INTRAVENOUS ONCE
Status: CANCELLED | OUTPATIENT
Start: 2019-03-26

## 2019-03-12 RX ORDER — DIPHENHYDRAMINE HYDROCHLORIDE 50 MG/ML
50 INJECTION INTRAMUSCULAR; INTRAVENOUS ONCE
Status: CANCELLED | OUTPATIENT
Start: 2019-03-12 | End: 2019-03-12

## 2019-03-12 RX ORDER — SODIUM CHLORIDE 9 MG/ML
INJECTION, SOLUTION INTRAVENOUS ONCE
Status: CANCELLED | OUTPATIENT
Start: 2019-04-09 | End: 2019-04-09

## 2019-03-12 RX ORDER — FUROSEMIDE 20 MG/1
20 TABLET ORAL DAILY
Qty: 60 TABLET | Refills: 3 | Status: SHIPPED | OUTPATIENT
Start: 2019-03-12 | End: 2019-03-19 | Stop reason: ALTCHOICE

## 2019-03-12 RX ORDER — ATROPINE SULFATE 0.4 MG/ML
0.4 AMPUL (ML) INJECTION
Status: CANCELLED | OUTPATIENT
Start: 2019-03-26

## 2019-03-12 RX ORDER — SODIUM CHLORIDE 0.9 % (FLUSH) 0.9 %
10 SYRINGE (ML) INJECTION PRN
Status: DISCONTINUED | OUTPATIENT
Start: 2019-03-12 | End: 2019-03-13 | Stop reason: HOSPADM

## 2019-03-12 RX ORDER — DEXTROSE MONOHYDRATE 50 MG/ML
INJECTION, SOLUTION INTRAVENOUS ONCE
Status: DISCONTINUED | OUTPATIENT
Start: 2019-03-12 | End: 2019-03-13 | Stop reason: HOSPADM

## 2019-03-12 RX ORDER — HEPARIN SODIUM (PORCINE) LOCK FLUSH IV SOLN 100 UNIT/ML 100 UNIT/ML
500 SOLUTION INTRAVENOUS PRN
Status: CANCELLED | OUTPATIENT
Start: 2019-03-26

## 2019-03-12 RX ORDER — ATROPINE SULFATE 0.4 MG/ML
0.4 AMPUL (ML) INJECTION
Status: CANCELLED | OUTPATIENT
Start: 2019-04-09

## 2019-03-12 RX ORDER — DIPHENHYDRAMINE HYDROCHLORIDE 50 MG/ML
50 INJECTION INTRAMUSCULAR; INTRAVENOUS ONCE
Status: CANCELLED | OUTPATIENT
Start: 2019-04-09 | End: 2019-04-09

## 2019-03-12 RX ORDER — PALONOSETRON 0.05 MG/ML
0.25 INJECTION, SOLUTION INTRAVENOUS ONCE
Status: CANCELLED | OUTPATIENT
Start: 2019-04-09

## 2019-03-12 RX ORDER — DEXAMETHASONE SODIUM PHOSPHATE 10 MG/ML
10 INJECTION, SOLUTION INTRAMUSCULAR; INTRAVENOUS ONCE
Status: COMPLETED | OUTPATIENT
Start: 2019-03-12 | End: 2019-03-12

## 2019-03-12 RX ORDER — SODIUM CHLORIDE 0.9 % (FLUSH) 0.9 %
10 SYRINGE (ML) INJECTION PRN
Status: CANCELLED | OUTPATIENT
Start: 2019-03-26

## 2019-03-12 RX ORDER — DEXTROSE MONOHYDRATE 50 MG/ML
INJECTION, SOLUTION INTRAVENOUS ONCE
Status: CANCELLED | OUTPATIENT
Start: 2019-03-26 | End: 2019-03-26

## 2019-03-12 RX ORDER — PALONOSETRON 0.05 MG/ML
0.25 INJECTION, SOLUTION INTRAVENOUS ONCE
Status: COMPLETED | OUTPATIENT
Start: 2019-03-12 | End: 2019-03-12

## 2019-03-12 RX ORDER — SODIUM CHLORIDE 9 MG/ML
INJECTION, SOLUTION INTRAVENOUS CONTINUOUS
Status: CANCELLED | OUTPATIENT
Start: 2019-03-12

## 2019-03-12 RX ORDER — ATROPINE SULFATE 0.4 MG/ML
0.4 AMPUL (ML) INJECTION
Status: CANCELLED | OUTPATIENT
Start: 2019-03-12

## 2019-03-12 RX ORDER — DEXTROSE MONOHYDRATE 50 MG/ML
INJECTION, SOLUTION INTRAVENOUS ONCE
Status: CANCELLED | OUTPATIENT
Start: 2019-03-12 | End: 2019-03-12

## 2019-03-12 RX ORDER — PALONOSETRON 0.05 MG/ML
0.25 INJECTION, SOLUTION INTRAVENOUS ONCE
Status: CANCELLED | OUTPATIENT
Start: 2019-03-12

## 2019-03-12 RX ORDER — 0.9 % SODIUM CHLORIDE 0.9 %
10 VIAL (ML) INJECTION ONCE
Status: CANCELLED | OUTPATIENT
Start: 2019-03-12 | End: 2019-03-12

## 2019-03-12 RX ORDER — METHYLPREDNISOLONE SODIUM SUCCINATE 125 MG/2ML
125 INJECTION, POWDER, LYOPHILIZED, FOR SOLUTION INTRAMUSCULAR; INTRAVENOUS ONCE
Status: CANCELLED | OUTPATIENT
Start: 2019-03-12 | End: 2019-03-12

## 2019-03-12 RX ORDER — DIPHENHYDRAMINE HYDROCHLORIDE 50 MG/ML
50 INJECTION INTRAMUSCULAR; INTRAVENOUS ONCE
Status: CANCELLED | OUTPATIENT
Start: 2019-03-26 | End: 2019-03-26

## 2019-03-12 RX ORDER — SODIUM CHLORIDE 9 MG/ML
INJECTION, SOLUTION INTRAVENOUS CONTINUOUS
Status: CANCELLED | OUTPATIENT
Start: 2019-03-26

## 2019-03-12 RX ORDER — HEPARIN SODIUM (PORCINE) LOCK FLUSH IV SOLN 100 UNIT/ML 100 UNIT/ML
500 SOLUTION INTRAVENOUS PRN
Status: DISCONTINUED | OUTPATIENT
Start: 2019-03-12 | End: 2019-03-13 | Stop reason: HOSPADM

## 2019-03-12 RX ORDER — SODIUM CHLORIDE 0.9 % (FLUSH) 0.9 %
10 SYRINGE (ML) INJECTION PRN
Status: CANCELLED | OUTPATIENT
Start: 2019-04-09

## 2019-03-12 RX ORDER — 0.9 % SODIUM CHLORIDE 0.9 %
10 VIAL (ML) INJECTION ONCE
Status: CANCELLED | OUTPATIENT
Start: 2019-03-26 | End: 2019-03-26

## 2019-03-12 RX ORDER — SODIUM CHLORIDE 9 MG/ML
INJECTION, SOLUTION INTRAVENOUS CONTINUOUS
Status: CANCELLED | OUTPATIENT
Start: 2019-04-09

## 2019-03-12 RX ADMIN — IRINOTECAN HYDROCHLORIDE 280 MG: 40 INJECTION, SOLUTION INTRAVENOUS at 12:31

## 2019-03-12 RX ADMIN — FLUOROURACIL 3750 MG: 50 INJECTION, SOLUTION INTRAVENOUS at 14:00

## 2019-03-12 RX ADMIN — LEUCOVORIN CALCIUM 600 MG: 200 INJECTION, POWDER, LYOPHILIZED, FOR SOLUTION INTRAMUSCULAR; INTRAVENOUS at 12:31

## 2019-03-12 RX ADMIN — ATROPINE SULFATE 0.4 MG: 0.4 INJECTION, SOLUTION INTRAMUSCULAR; INTRAVENOUS; SUBCUTANEOUS at 11:35

## 2019-03-12 RX ADMIN — DEXAMETHASONE SODIUM PHOSPHATE 10 MG: 10 INJECTION, SOLUTION INTRAMUSCULAR; INTRAVENOUS at 11:33

## 2019-03-12 RX ADMIN — SODIUM CHLORIDE 150 MG: 900 INJECTION, SOLUTION INTRAVENOUS at 11:50

## 2019-03-12 RX ADMIN — PALONOSETRON HYDROCHLORIDE 0.25 MG: 0.25 INJECTION, SOLUTION INTRAVENOUS at 11:32

## 2019-03-12 RX ADMIN — DEXTROSE MONOHYDRATE: 50 INJECTION, SOLUTION INTRAVENOUS at 11:31

## 2019-03-12 NOTE — PROGRESS NOTES
Patient saw Dr. Tiffanie Guzmán today see his dictation. Labs reviewed. Dr. Tiffanie Guzmán aware WBC 15.4  Patient here for C6D1. Patient tolerated treatment well and was discharged home with spouse in stable condition.

## 2019-03-14 ENCOUNTER — HOSPITAL ENCOUNTER (OUTPATIENT)
Dept: INFUSION THERAPY | Age: 59
Discharge: HOME | End: 2019-03-14
Payer: COMMERCIAL

## 2019-03-14 DIAGNOSIS — C25.1 MALIGNANT NEOPLASM OF BODY OF PANCREAS (HCC): Primary | ICD-10-CM

## 2019-03-14 PROCEDURE — 96372 THER/PROPH/DIAG INJ SC/IM: CPT

## 2019-03-14 PROCEDURE — 6360000002 HC RX W HCPCS: Performed by: INTERNAL MEDICINE

## 2019-03-14 PROCEDURE — 2580000003 HC RX 258: Performed by: INTERNAL MEDICINE

## 2019-03-14 PROCEDURE — 96523 IRRIG DRUG DELIVERY DEVICE: CPT

## 2019-03-14 RX ORDER — HEPARIN SODIUM (PORCINE) LOCK FLUSH IV SOLN 100 UNIT/ML 100 UNIT/ML
500 SOLUTION INTRAVENOUS PRN
Status: CANCELLED | OUTPATIENT
Start: 2019-03-14

## 2019-03-14 RX ORDER — SODIUM CHLORIDE 0.9 % (FLUSH) 0.9 %
20 SYRINGE (ML) INJECTION PRN
Status: CANCELLED | OUTPATIENT
Start: 2019-03-14

## 2019-03-14 RX ORDER — HEPARIN SODIUM (PORCINE) LOCK FLUSH IV SOLN 100 UNIT/ML 100 UNIT/ML
500 SOLUTION INTRAVENOUS PRN
Status: DISCONTINUED | OUTPATIENT
Start: 2019-03-14 | End: 2019-03-15 | Stop reason: HOSPADM

## 2019-03-14 RX ORDER — SODIUM CHLORIDE 0.9 % (FLUSH) 0.9 %
10 SYRINGE (ML) INJECTION PRN
Status: DISCONTINUED | OUTPATIENT
Start: 2019-03-14 | End: 2019-03-15 | Stop reason: HOSPADM

## 2019-03-14 RX ORDER — SODIUM CHLORIDE 0.9 % (FLUSH) 0.9 %
10 SYRINGE (ML) INJECTION PRN
Status: CANCELLED | OUTPATIENT
Start: 2019-03-14

## 2019-03-14 RX ADMIN — Medication 500 UNITS: at 14:53

## 2019-03-14 RX ADMIN — Medication 10 ML: at 14:53

## 2019-03-14 RX ADMIN — PEGFILGRASTIM 6 MG: 6 INJECTION SUBCUTANEOUS at 14:55

## 2019-03-18 DIAGNOSIS — F41.9 ANXIETY: ICD-10-CM

## 2019-03-18 DIAGNOSIS — C25.1 MALIGNANT NEOPLASM OF BODY OF PANCREAS (HCC): ICD-10-CM

## 2019-03-19 ENCOUNTER — HOSPITAL ENCOUNTER (OUTPATIENT)
Dept: WOUND CARE | Age: 59
Discharge: HOME | End: 2019-03-19
Payer: COMMERCIAL

## 2019-03-19 VITALS
TEMPERATURE: 97.3 F | RESPIRATION RATE: 18 BRPM | DIASTOLIC BLOOD PRESSURE: 60 MMHG | HEART RATE: 99 BPM | SYSTOLIC BLOOD PRESSURE: 108 MMHG

## 2019-03-19 DIAGNOSIS — L89.152 PRESSURE ULCER OF COCCYGEAL REGION, STAGE 2 (HCC): Primary | ICD-10-CM

## 2019-03-19 PROCEDURE — 11042 DBRDMT SUBQ TIS 1ST 20SQCM/<: CPT | Performed by: NURSE PRACTITIONER

## 2019-03-19 PROCEDURE — 11042 DBRDMT SUBQ TIS 1ST 20SQCM/<: CPT

## 2019-03-19 PROCEDURE — 6370000000 HC RX 637 (ALT 250 FOR IP): Performed by: NURSE PRACTITIONER

## 2019-03-19 RX ORDER — LIDOCAINE HYDROCHLORIDE 40 MG/ML
SOLUTION TOPICAL ONCE
Status: COMPLETED | OUTPATIENT
Start: 2019-03-19 | End: 2019-03-19

## 2019-03-19 RX ORDER — OLANZAPINE 5 MG/1
5 TABLET ORAL PRN
Status: ON HOLD | COMMUNITY
End: 2019-07-18 | Stop reason: SDUPTHER

## 2019-03-19 RX ORDER — OXYCODONE HYDROCHLORIDE 10 MG/1
10 TABLET ORAL EVERY 6 HOURS PRN
Qty: 120 TABLET | Refills: 0 | Status: SHIPPED | OUTPATIENT
Start: 2019-03-19 | End: 2019-05-06 | Stop reason: SDUPTHER

## 2019-03-19 RX ADMIN — LIDOCAINE HYDROCHLORIDE 5 ML: 40 SOLUTION TOPICAL at 13:20

## 2019-03-20 ENCOUNTER — HOSPITAL ENCOUNTER (EMERGENCY)
Age: 59
Discharge: HOME | End: 2019-03-20
Attending: EMERGENCY MEDICINE
Payer: COMMERCIAL

## 2019-03-20 ENCOUNTER — TELEPHONE (OUTPATIENT)
Dept: ONCOLOGY | Age: 59
End: 2019-03-20

## 2019-03-20 VITALS
SYSTOLIC BLOOD PRESSURE: 114 MMHG | HEART RATE: 110 BPM | OXYGEN SATURATION: 98 % | WEIGHT: 115 LBS | BODY MASS INDEX: 19.63 KG/M2 | RESPIRATION RATE: 16 BRPM | TEMPERATURE: 98.3 F | HEIGHT: 64 IN | DIASTOLIC BLOOD PRESSURE: 71 MMHG

## 2019-03-20 DIAGNOSIS — K56.41 FECAL IMPACTION IN RECTUM (HCC): Primary | ICD-10-CM

## 2019-03-20 PROCEDURE — 99283 EMERGENCY DEPT VISIT LOW MDM: CPT

## 2019-03-20 PROCEDURE — 6370000000 HC RX 637 (ALT 250 FOR IP): Performed by: EMERGENCY MEDICINE

## 2019-03-20 RX ADMIN — MAGNESIUM CITRATE 296 ML: 1.75 LIQUID ORAL at 13:37

## 2019-03-20 ASSESSMENT — ENCOUNTER SYMPTOMS
NAUSEA: 0
COUGH: 0
DIARRHEA: 0
VOMITING: 0
BLOOD IN STOOL: 0
EYE PAIN: 0
CONSTIPATION: 1
CHEST TIGHTNESS: 0
RHINORRHEA: 0
SHORTNESS OF BREATH: 0
EYE REDNESS: 0
FACIAL SWELLING: 0
COLOR CHANGE: 0
EYE DISCHARGE: 0
SORE THROAT: 0
TROUBLE SWALLOWING: 0
SINUS PRESSURE: 0
WHEEZING: 0
ABDOMINAL PAIN: 0
BACK PAIN: 0

## 2019-03-20 ASSESSMENT — PAIN DESCRIPTION - PAIN TYPE: TYPE: ACUTE PAIN

## 2019-03-20 ASSESSMENT — PAIN SCALES - GENERAL: PAINLEVEL_OUTOF10: 2

## 2019-03-21 ENCOUNTER — TELEPHONE (OUTPATIENT)
Dept: ONCOLOGY | Age: 59
End: 2019-03-21

## 2019-03-26 ENCOUNTER — HOSPITAL ENCOUNTER (OUTPATIENT)
Dept: INFUSION THERAPY | Age: 59
Discharge: HOME | End: 2019-03-26
Payer: COMMERCIAL

## 2019-03-26 ENCOUNTER — OFFICE VISIT (OUTPATIENT)
Dept: ONCOLOGY | Age: 59
End: 2019-03-26
Payer: COMMERCIAL

## 2019-03-26 ENCOUNTER — TELEPHONE (OUTPATIENT)
Dept: ONCOLOGY | Age: 59
End: 2019-03-26

## 2019-03-26 VITALS
WEIGHT: 114.8 LBS | DIASTOLIC BLOOD PRESSURE: 70 MMHG | BODY MASS INDEX: 19.71 KG/M2 | SYSTOLIC BLOOD PRESSURE: 108 MMHG | HEART RATE: 100 BPM | TEMPERATURE: 98.4 F

## 2019-03-26 DIAGNOSIS — R29.898 WEAKNESS OF BOTH LOWER EXTREMITIES: ICD-10-CM

## 2019-03-26 DIAGNOSIS — G95.11 SPINAL CORD INFARCTION (HCC): ICD-10-CM

## 2019-03-26 DIAGNOSIS — C25.1 MALIGNANT NEOPLASM OF BODY OF PANCREAS (HCC): Primary | ICD-10-CM

## 2019-03-26 DIAGNOSIS — R11.2 CINV (CHEMOTHERAPY-INDUCED NAUSEA AND VOMITING): ICD-10-CM

## 2019-03-26 DIAGNOSIS — T45.1X5A CINV (CHEMOTHERAPY-INDUCED NAUSEA AND VOMITING): ICD-10-CM

## 2019-03-26 LAB
ABSOLUTE EOS #: 0.5 K/UL (ref 0–0.4)
ABSOLUTE IMMATURE GRANULOCYTE: ABNORMAL K/UL (ref 0–0.3)
ABSOLUTE LYMPH #: 1.3 K/UL (ref 1–4.8)
ABSOLUTE MONO #: 0.8 K/UL (ref 0.1–1.2)
ALBUMIN SERPL-MCNC: 3.6 G/DL (ref 3.5–5.2)
ALBUMIN/GLOBULIN RATIO: 1.4 (ref 1–2.5)
ALP BLD-CCNC: 100 U/L (ref 35–104)
ALT SERPL-CCNC: 17 U/L (ref 5–33)
ANION GAP SERPL CALCULATED.3IONS-SCNC: 11 MMOL/L (ref 9–17)
AST SERPL-CCNC: 20 U/L
BASOPHILS # BLD: 1 % (ref 0–2)
BASOPHILS ABSOLUTE: 0.1 K/UL (ref 0–0.2)
BILIRUB SERPL-MCNC: 0.15 MG/DL (ref 0.3–1.2)
BUN BLDV-MCNC: 12 MG/DL (ref 6–20)
BUN/CREAT BLD: ABNORMAL (ref 9–20)
CALCIUM SERPL-MCNC: 9.7 MG/DL (ref 8.6–10.4)
CHLORIDE BLD-SCNC: 105 MMOL/L (ref 98–107)
CO2: 27 MMOL/L (ref 20–31)
CREAT SERPL-MCNC: 0.56 MG/DL (ref 0.5–0.9)
DIFFERENTIAL TYPE: ABNORMAL
EOSINOPHILS RELATIVE PERCENT: 4 % (ref 1–4)
GFR AFRICAN AMERICAN: >60 ML/MIN
GFR NON-AFRICAN AMERICAN: >60 ML/MIN
GFR SERPL CREATININE-BSD FRML MDRD: ABNORMAL ML/MIN/{1.73_M2}
GFR SERPL CREATININE-BSD FRML MDRD: ABNORMAL ML/MIN/{1.73_M2}
GLUCOSE BLD-MCNC: 112 MG/DL (ref 70–99)
HCT VFR BLD CALC: 31.4 % (ref 36–46)
HEMOGLOBIN: 10.5 G/DL (ref 12–16)
IMMATURE GRANULOCYTES: ABNORMAL %
LYMPHOCYTES # BLD: 10 % (ref 24–44)
MCH RBC QN AUTO: 31.3 PG (ref 26–34)
MCHC RBC AUTO-ENTMCNC: 33.4 G/DL (ref 31–37)
MCV RBC AUTO: 93.9 FL (ref 80–100)
MONOCYTES # BLD: 6 % (ref 2–11)
NRBC AUTOMATED: ABNORMAL PER 100 WBC
PDW BLD-RTO: 16.8 % (ref 12.5–15.4)
PLATELET # BLD: 250 K/UL (ref 140–450)
PLATELET ESTIMATE: ABNORMAL
PMV BLD AUTO: 8.5 FL (ref 6–12)
POTASSIUM SERPL-SCNC: 4.3 MMOL/L (ref 3.7–5.3)
RBC # BLD: 3.35 M/UL (ref 4–5.2)
RBC # BLD: ABNORMAL 10*6/UL
SEG NEUTROPHILS: 79 % (ref 36–66)
SEGMENTED NEUTROPHILS ABSOLUTE COUNT: 9.5 K/UL (ref 1.8–7.7)
SODIUM BLD-SCNC: 143 MMOL/L (ref 135–144)
TOTAL PROTEIN: 6.1 G/DL (ref 6.4–8.3)
WBC # BLD: 12.1 K/UL (ref 3.5–11)
WBC # BLD: ABNORMAL 10*3/UL

## 2019-03-26 PROCEDURE — 96367 TX/PROPH/DG ADDL SEQ IV INF: CPT

## 2019-03-26 PROCEDURE — 96368 THER/DIAG CONCURRENT INF: CPT

## 2019-03-26 PROCEDURE — 99214 OFFICE O/P EST MOD 30 MIN: CPT | Performed by: INTERNAL MEDICINE

## 2019-03-26 PROCEDURE — 96413 CHEMO IV INFUSION 1 HR: CPT

## 2019-03-26 PROCEDURE — 96416 CHEMO PROLONG INFUSE W/PUMP: CPT

## 2019-03-26 PROCEDURE — 2580000003 HC RX 258: Performed by: INTERNAL MEDICINE

## 2019-03-26 PROCEDURE — 96366 THER/PROPH/DIAG IV INF ADDON: CPT

## 2019-03-26 PROCEDURE — 85025 COMPLETE CBC W/AUTO DIFF WBC: CPT

## 2019-03-26 PROCEDURE — 96409 CHEMO IV PUSH SNGL DRUG: CPT

## 2019-03-26 PROCEDURE — 96375 TX/PRO/DX INJ NEW DRUG ADDON: CPT

## 2019-03-26 PROCEDURE — 80053 COMPREHEN METABOLIC PANEL: CPT

## 2019-03-26 PROCEDURE — 96415 CHEMO IV INFUSION ADDL HR: CPT

## 2019-03-26 PROCEDURE — 36591 DRAW BLOOD OFF VENOUS DEVICE: CPT

## 2019-03-26 PROCEDURE — 6360000002 HC RX W HCPCS: Performed by: INTERNAL MEDICINE

## 2019-03-26 RX ORDER — DEXAMETHASONE SODIUM PHOSPHATE 10 MG/ML
10 INJECTION, SOLUTION INTRAMUSCULAR; INTRAVENOUS ONCE
Status: COMPLETED | OUTPATIENT
Start: 2019-03-26 | End: 2019-03-26

## 2019-03-26 RX ORDER — PALONOSETRON 0.05 MG/ML
0.25 INJECTION, SOLUTION INTRAVENOUS ONCE
Status: COMPLETED | OUTPATIENT
Start: 2019-03-26 | End: 2019-03-26

## 2019-03-26 RX ORDER — DEXTROSE MONOHYDRATE 50 MG/ML
INJECTION, SOLUTION INTRAVENOUS ONCE
Status: COMPLETED | OUTPATIENT
Start: 2019-03-26 | End: 2019-03-26

## 2019-03-26 RX ORDER — HEPARIN SODIUM (PORCINE) LOCK FLUSH IV SOLN 100 UNIT/ML 100 UNIT/ML
500 SOLUTION INTRAVENOUS PRN
Status: DISCONTINUED | OUTPATIENT
Start: 2019-03-26 | End: 2019-03-27 | Stop reason: HOSPADM

## 2019-03-26 RX ORDER — ATROPINE SULFATE 0.4 MG/ML
0.4 AMPUL (ML) INJECTION
Status: COMPLETED | OUTPATIENT
Start: 2019-03-26 | End: 2019-03-26

## 2019-03-26 RX ORDER — SODIUM CHLORIDE 0.9 % (FLUSH) 0.9 %
10 SYRINGE (ML) INJECTION PRN
Status: DISCONTINUED | OUTPATIENT
Start: 2019-03-26 | End: 2019-03-27 | Stop reason: HOSPADM

## 2019-03-26 RX ADMIN — SODIUM CHLORIDE 150 MG: 900 INJECTION, SOLUTION INTRAVENOUS at 11:30

## 2019-03-26 RX ADMIN — Medication 10 ML: at 13:52

## 2019-03-26 RX ADMIN — FLUOROURACIL 3750 MG: 50 INJECTION, SOLUTION INTRAVENOUS at 13:52

## 2019-03-26 RX ADMIN — LEUCOVORIN CALCIUM 600 MG: 200 INJECTION, POWDER, LYOPHILIZED, FOR SOLUTION INTRAMUSCULAR; INTRAVENOUS at 12:07

## 2019-03-26 RX ADMIN — DEXAMETHASONE SODIUM PHOSPHATE 10 MG: 10 INJECTION, SOLUTION INTRAMUSCULAR; INTRAVENOUS at 11:25

## 2019-03-26 RX ADMIN — ATROPINE SULFATE 0.4 MG: 0.4 INJECTION, SOLUTION INTRAMUSCULAR; INTRAVENOUS; SUBCUTANEOUS at 11:25

## 2019-03-26 RX ADMIN — DEXTROSE MONOHYDRATE: 50 INJECTION, SOLUTION INTRAVENOUS at 11:24

## 2019-03-26 RX ADMIN — IRINOTECAN HYDROCHLORIDE 280 MG: 40 INJECTION, SOLUTION INTRAVENOUS at 12:07

## 2019-03-26 RX ADMIN — PALONOSETRON 0.25 MG: 0.05 INJECTION, SOLUTION INTRAVENOUS at 11:25

## 2019-03-26 NOTE — PROGRESS NOTES
Pt here for C.7D.1 Irinotecan/Leucovorin/5FU. Pt seen by Dr. Garima Sheehan prior to tx. Labs drawn from port at MD visit and results reviewed. Pt was treated without incident and d/c'd in stable condition. Pt will return on 3/28/19 for 5FU pump d/c.

## 2019-03-28 ENCOUNTER — HOSPITAL ENCOUNTER (OUTPATIENT)
Dept: INFUSION THERAPY | Age: 59
Discharge: HOME | End: 2019-03-28
Payer: COMMERCIAL

## 2019-03-28 DIAGNOSIS — C25.1 MALIGNANT NEOPLASM OF BODY OF PANCREAS (HCC): Primary | ICD-10-CM

## 2019-03-28 PROCEDURE — 96523 IRRIG DRUG DELIVERY DEVICE: CPT

## 2019-03-28 PROCEDURE — 96372 THER/PROPH/DIAG INJ SC/IM: CPT

## 2019-03-28 PROCEDURE — 2580000003 HC RX 258: Performed by: INTERNAL MEDICINE

## 2019-03-28 PROCEDURE — 6360000002 HC RX W HCPCS: Performed by: INTERNAL MEDICINE

## 2019-03-28 RX ORDER — SODIUM CHLORIDE 0.9 % (FLUSH) 0.9 %
10 SYRINGE (ML) INJECTION PRN
Status: DISCONTINUED | OUTPATIENT
Start: 2019-03-28 | End: 2019-03-29 | Stop reason: HOSPADM

## 2019-03-28 RX ORDER — HEPARIN SODIUM (PORCINE) LOCK FLUSH IV SOLN 100 UNIT/ML 100 UNIT/ML
500 SOLUTION INTRAVENOUS PRN
Status: DISCONTINUED | OUTPATIENT
Start: 2019-03-28 | End: 2019-03-29 | Stop reason: HOSPADM

## 2019-03-28 RX ORDER — HEPARIN SODIUM (PORCINE) LOCK FLUSH IV SOLN 100 UNIT/ML 100 UNIT/ML
500 SOLUTION INTRAVENOUS PRN
Status: CANCELLED | OUTPATIENT
Start: 2019-03-28

## 2019-03-28 RX ORDER — SODIUM CHLORIDE 0.9 % (FLUSH) 0.9 %
10 SYRINGE (ML) INJECTION PRN
Status: CANCELLED | OUTPATIENT
Start: 2019-03-28

## 2019-03-28 RX ORDER — SODIUM CHLORIDE 0.9 % (FLUSH) 0.9 %
20 SYRINGE (ML) INJECTION PRN
Status: CANCELLED | OUTPATIENT
Start: 2019-03-28

## 2019-03-28 RX ADMIN — Medication 500 UNITS: at 13:17

## 2019-03-28 RX ADMIN — Medication 10 ML: at 13:17

## 2019-03-28 RX ADMIN — PEGFILGRASTIM 6 MG: 6 INJECTION SUBCUTANEOUS at 13:26

## 2019-03-28 NOTE — PROGRESS NOTES
Pt is here for CADD pump d/c & neulasta. Denies any problems at this time. Pt was discharged in stable condition and will return on 4/9 for C8D1.

## 2019-04-02 ENCOUNTER — HOSPITAL ENCOUNTER (OUTPATIENT)
Dept: WOUND CARE | Age: 59
Discharge: HOME | End: 2019-04-02
Payer: COMMERCIAL

## 2019-04-02 VITALS
HEART RATE: 105 BPM | HEIGHT: 64 IN | WEIGHT: 114 LBS | BODY MASS INDEX: 19.46 KG/M2 | SYSTOLIC BLOOD PRESSURE: 106 MMHG | RESPIRATION RATE: 18 BRPM | TEMPERATURE: 97.6 F | DIASTOLIC BLOOD PRESSURE: 76 MMHG

## 2019-04-02 DIAGNOSIS — E44.0 MODERATE PROTEIN-CALORIE MALNUTRITION (HCC): ICD-10-CM

## 2019-04-02 DIAGNOSIS — L89.152 PRESSURE ULCER OF COCCYGEAL REGION, STAGE 2 (HCC): ICD-10-CM

## 2019-04-02 DIAGNOSIS — C25.1 MALIGNANT NEOPLASM OF BODY OF PANCREAS (HCC): Primary | ICD-10-CM

## 2019-04-02 PROCEDURE — 6370000000 HC RX 637 (ALT 250 FOR IP): Performed by: NURSE PRACTITIONER

## 2019-04-02 PROCEDURE — 11042 DBRDMT SUBQ TIS 1ST 20SQCM/<: CPT | Performed by: NURSE PRACTITIONER

## 2019-04-02 PROCEDURE — 11042 DBRDMT SUBQ TIS 1ST 20SQCM/<: CPT

## 2019-04-02 RX ORDER — LIDOCAINE HYDROCHLORIDE 40 MG/ML
SOLUTION TOPICAL ONCE
Status: COMPLETED | OUTPATIENT
Start: 2019-04-02 | End: 2019-04-02

## 2019-04-02 RX ADMIN — LIDOCAINE HYDROCHLORIDE 5 ML: 40 SOLUTION TOPICAL at 13:14

## 2019-04-02 ASSESSMENT — PAIN SCALES - GENERAL: PAINLEVEL_OUTOF10: 0

## 2019-04-02 NOTE — PROGRESS NOTES
11/7/2018    ENDOSCOPIC ULTRASOUND WITH FNA PANCREAS performed by Davonte Strickland MD at [address] W/ SUBCUTANEOUS PORT         FAMILY HISTORY    Family History   Problem Relation Age of Onset    Breast Cancer Mother 64    Heart Disease Father     Coronary Art Dis Father     Ovarian Cancer Maternal Grandmother 79    Pancreatic Cancer Paternal Uncle 80    Cancer Paternal Uncle        SOCIAL HISTORY    Social History     Tobacco Use    Smoking status: Never Smoker    Smokeless tobacco: Never Used   Substance Use Topics    Alcohol use: No    Drug use: No       ALLERGIES    No Known Allergies    MEDICATIONS    Current Outpatient Medications on File Prior to Encounter   Medication Sig Dispense Refill    oxyCODONE HCl (OXY-IR) 10 MG immediate release tablet Take 1 tablet by mouth every 6 hours as needed for Pain for up to 30 days. 120 tablet 0    OLANZapine (ZYPREXA) 5 MG tablet Take 5 mg by mouth      prochlorperazine (COMPAZINE) 10 MG tablet Take 1 tablet by mouth every 6 hours as needed for Nausea 120 tablet 2    promethazine (PHENERGAN) 12.5 MG tablet Take 1 tablet by mouth every 6 hours as needed for Nausea 120 tablet 2    aspirin 81 MG chewable tablet Take 81 mg by mouth daily      sennosides-docusate sodium (SENOKOT-S) 8.6-50 MG tablet Take 1 tablet by mouth daily as needed for Constipation      lidocaine-prilocaine (EMLA) 2.5-2.5 % cream Apply topically as needed. Apply a quarter size amount to port site 1 hour before chemotherapy appointment. Cover with plastic wrap. 30 g 1    ondansetron (ZOFRAN-ODT) 8 MG TBDP disintegrating tablet Take 1 tablet by mouth every 8 hours as needed for Nausea or Vomiting 90 tablet 3     No current facility-administered medications on file prior to encounter. REVIEW OF SYSTEMS    Pertinent items are noted in HPI.     Objective:     Problem List Items Addressed This Visit     Malignant neoplasm of body of pancreas ([facility] [address] - Primary    Moderate protein-calorie malnutrition (HCC)    Pressure ulcer of coccygeal region, stage 2           /76   Pulse 105   Temp 97.6 °F (36.4 °C) (Tympanic)   Resp 18   Ht 5' 4\" (1.626 m)   Wt 114 lb (51.7 kg)   LMP 09/07/2012   BMI 19.57 kg/m²       Wt Readings from Last 3 Encounters:   04/02/19 114 lb (51.7 kg)   03/26/19 114 lb 12.8 oz (52.1 kg)   03/20/19 115 lb (52.2 kg)       PHYSICAL EXAM    General Appearance: alert and oriented to person, place and time, well-developed and well-nourished, in no acute distress, in no acute distress, alert and frail-appearing  Skin: warm and dry and open wound to coccyx that is pink, clean, and smaller in size this week. Healing blister to right medial heel. Area is dry, no surrounding erythema. Assessment:         Procedure Note  Indications:  Based on my examination of this patient's wound(s)/ulcer(s) today, debridement is required to promote healing and evaluate the wound base. Performed by: Tawnya Goltz, APRN - CNP    Consent obtained:  Yes    Time out taken:  Yes    Pain Control: Anesthetic  Anesthetic: 4% Lidocaine Liquid Topical       Debridement:Excisional Debridement    Using curette the wound(s)/ulcer(s) was/were sharply debrided down through and including the removal of subcutaneous tissue.         Devitalized Tissue Debrided:  fibrin, biofilm and slough    Pre Debridement Measurements:  Are located in the [place]  Documentation Flow Sheet    Wound/Ulcer #: 1    Post Debridement Measurements:  Wound/Ulcer Descriptions are Pre Debridement except measurements:    Wound 02/28/19 Coccyx Wound #1  (Active)   Wound Image   2/28/2019 10:49 AM   Wound Pressure Stage  3 4/2/2019  1:11 PM   Dressing Status Dry 4/2/2019  1:11 PM   Dressing Changed Changed/New 4/2/2019  1:11 PM   Wound Cleansed Rinsed/Irrigated with saline 4/2/2019  1:11 PM   Wound Length (cm) 1 cm 4/2/2019  1:11 PM   Wound Width (cm) 0.2 cm 4/2/2019  1:11 PM   Wound Depth (cm) 0.2 cm 4/2/2019  1:11 PM   Wound Surface Area (cm^2) 0.2 cm^2 4/2/2019  1:11 PM   Change in Wound Size % (l*w) 90 4/2/2019  1:11 PM   Wound Volume (cm^3) 0.04 cm^3 4/2/2019  1:11 PM   Wound Healing % 90 4/2/2019  1:11 PM   Post-Procedure Length (cm) 1 cm 4/2/2019  1:11 PM   Post-Procedure Width (cm) 0.2 cm 4/2/2019  1:11 PM   Post-Procedure Depth (cm) 0.2 cm 4/2/2019  1:11 PM   Post-Procedure Surface Area (cm^2) 0.2 cm^2 4/2/2019  1:11 PM   Post-Procedure Volume (cm^3) 0.04 cm^3 4/2/2019  1:11 PM   Wound Assessment Clean;Dry 4/2/2019  1:11 PM   Drainage Amount Moderate 4/2/2019  1:11 PM   Drainage Description Serous 4/2/2019  1:11 PM   Odor None 4/2/2019  1:11 PM   Margins Defined edges 4/2/2019  1:11 PM   Natalia-wound Assessment Pink 4/2/2019  1:11 PM   [place]%Wound Bed 80 4/2/2019  1:11 PM   Red%Wound Bed 20 4/2/2019  1:11 PM   Yellow%Wound Bed 80 3/19/2019  1:17 PM   Purple%Wound Bed 0 3/5/2019  1:08 PM   Number of days: 33          Percent of Wound(s)/Ulcer(s) Debrided: 100%    Total Surface Area Debrided:  0.2 sq cm       Diabetic/Pressure/Non Pressure Ulcers only:  Ulcer: Pressure ulcer, Stage 2      Estimated Blood Loss:  Minimal    Hemostasis Achieved:  by pressure    Procedural Pain:  0  / 10     Post Procedural Pain:  0 / 10     Response to treatment:  Well tolerated by patient. Plan:     Treatment Note please see attached Discharge Instructions    Written patient dismissal instructions given to patient and signed by patient or POA.          Discharge Instructions         [address] [place] [address] [facility]                                 Visit Cecilia Instructions / Physician Orders  DATE: 04/02/19     Home Care:      SUPPLIES ORDERED THRU:  [facility] ([facility])     Wound Location:  Coccyx     Cleanse with:  Liquid antibacterial soap and water, rinse well     Dressing Orders: Primary: Cynthia to wound, Secondary: cover with sacral mepilex border     Frequency: Every 3 days x30 days     Additional Orders: Increase protein to diet (meat, cheese, eggs, fish, peanut butter, nuts and beans)  Multivitamin daily  May apply skin prep and mepilex border to protect blister on right heel     HYPERBARIC TREATMENT-                TREATMENT #     Your next appointment with [address] [place] [address] [facility]     ROOM TYPE   [] CHAIR     [] BED   [] EITHER        TIME [] 45 MIN     [] 60 MIN     (Please note your next appointment above and if you are unable to keep, kindly give a 24 hour notice.  Thank you.)     If you experience any of the following, please call the [address] [place] [address] during business hours:  [phone]     * Increase in Pain  * Temperature over 101  * Increase in drainage from your wound  * Drainage with a foul odor  * Bleeding  * Increase in swelling  * Need for compression bandage changes due to slippage, breakthrough drainage.     If you need medical attention outside of the business hours of the [address] [place] [address] please contact your PCP or go to the nearest emergency room.     AVS REVIEWED    YES []     Patient Signature:__________________________________________________Date:_______  Electronically signed by Darrel Campbell RN on 4/2/2019 at 1:22 PM  Electronically signed by BREANNA Darby CNP on 4/2/2019 at 1:32 PM              Electronically signed by BREANNA Darby CNP on 4/3/2019 at 8:49 AM

## 2019-04-02 NOTE — PLAN OF CARE
Problem: Wound:  Goal: Will show signs of wound healing; wound closure and no evidence of infection  Description  Will show signs of wound healing; wound closure and no evidence of infection   Outcome: Ongoing     Problem: Pressure Ulcer:  Goal: Signs of wound healing will improve  Description  Signs of wound healing will improve   Outcome: Ongoing  Goal: Absence of new pressure ulcer  Description  Absence of new pressure ulcer   Outcome: Ongoing  Goal: Will show no infection signs and symptoms  Description  Will show no infection signs and symptoms   Outcome: Ongoing     Problem: Falls - Risk of:  Goal: Will remain free from falls  Description  Will remain free from falls   Outcome: Ongoing

## 2019-04-09 ENCOUNTER — HOSPITAL ENCOUNTER (OUTPATIENT)
Dept: INFUSION THERAPY | Age: 59
Discharge: HOME | End: 2019-04-09
Payer: COMMERCIAL

## 2019-04-09 ENCOUNTER — OFFICE VISIT (OUTPATIENT)
Dept: ONCOLOGY | Age: 59
End: 2019-04-09
Payer: COMMERCIAL

## 2019-04-09 VITALS
WEIGHT: 114.3 LBS | RESPIRATION RATE: 18 BRPM | TEMPERATURE: 98.1 F | DIASTOLIC BLOOD PRESSURE: 88 MMHG | SYSTOLIC BLOOD PRESSURE: 134 MMHG | HEART RATE: 102 BPM | BODY MASS INDEX: 19.62 KG/M2

## 2019-04-09 DIAGNOSIS — R29.898 WEAKNESS OF BOTH LOWER EXTREMITIES: ICD-10-CM

## 2019-04-09 DIAGNOSIS — C25.1 MALIGNANT NEOPLASM OF BODY OF PANCREAS (HCC): Primary | ICD-10-CM

## 2019-04-09 DIAGNOSIS — G95.11 SPINAL CORD INFARCTION (HCC): ICD-10-CM

## 2019-04-09 LAB
ABSOLUTE EOS #: 0.3 K/UL (ref 0–0.4)
ABSOLUTE IMMATURE GRANULOCYTE: ABNORMAL K/UL (ref 0–0.3)
ABSOLUTE LYMPH #: 1.2 K/UL (ref 1–4.8)
ABSOLUTE MONO #: 0.7 K/UL (ref 0.1–1.2)
ALBUMIN SERPL-MCNC: 3.8 G/DL (ref 3.5–5.2)
ALBUMIN/GLOBULIN RATIO: 1.4 (ref 1–2.5)
ALP BLD-CCNC: 106 U/L (ref 35–104)
ALT SERPL-CCNC: 22 U/L (ref 5–33)
ANION GAP SERPL CALCULATED.3IONS-SCNC: 11 MMOL/L (ref 9–17)
AST SERPL-CCNC: 26 U/L
BASOPHILS # BLD: 0 % (ref 0–2)
BASOPHILS ABSOLUTE: 0 K/UL (ref 0–0.2)
BILIRUB SERPL-MCNC: 0.22 MG/DL (ref 0.3–1.2)
BUN BLDV-MCNC: 12 MG/DL (ref 6–20)
BUN/CREAT BLD: ABNORMAL (ref 9–20)
CA 19-9: 1532 U/ML (ref 0–35)
CALCIUM SERPL-MCNC: 9.3 MG/DL (ref 8.6–10.4)
CHLORIDE BLD-SCNC: 103 MMOL/L (ref 98–107)
CO2: 26 MMOL/L (ref 20–31)
CREAT SERPL-MCNC: 0.54 MG/DL (ref 0.5–0.9)
DIFFERENTIAL TYPE: ABNORMAL
EOSINOPHILS RELATIVE PERCENT: 2 % (ref 1–4)
GFR AFRICAN AMERICAN: >60 ML/MIN
GFR NON-AFRICAN AMERICAN: >60 ML/MIN
GFR SERPL CREATININE-BSD FRML MDRD: ABNORMAL ML/MIN/{1.73_M2}
GFR SERPL CREATININE-BSD FRML MDRD: ABNORMAL ML/MIN/{1.73_M2}
GLUCOSE BLD-MCNC: 97 MG/DL (ref 70–99)
HCT VFR BLD CALC: 32.8 % (ref 36–46)
HEMOGLOBIN: 10.8 G/DL (ref 12–16)
IMMATURE GRANULOCYTES: ABNORMAL %
LYMPHOCYTES # BLD: 10 % (ref 24–44)
MCH RBC QN AUTO: 31 PG (ref 26–34)
MCHC RBC AUTO-ENTMCNC: 32.8 G/DL (ref 31–37)
MCV RBC AUTO: 94.4 FL (ref 80–100)
MONOCYTES # BLD: 6 % (ref 2–11)
NRBC AUTOMATED: ABNORMAL PER 100 WBC
PDW BLD-RTO: 16.4 % (ref 12.5–15.4)
PLATELET # BLD: 235 K/UL (ref 140–450)
PLATELET ESTIMATE: ABNORMAL
PMV BLD AUTO: 8.9 FL (ref 6–12)
POTASSIUM SERPL-SCNC: 4.1 MMOL/L (ref 3.7–5.3)
RBC # BLD: 3.47 M/UL (ref 4–5.2)
RBC # BLD: ABNORMAL 10*6/UL
SEG NEUTROPHILS: 82 % (ref 36–66)
SEGMENTED NEUTROPHILS ABSOLUTE COUNT: 10.3 K/UL (ref 1.8–7.7)
SODIUM BLD-SCNC: 140 MMOL/L (ref 135–144)
TOTAL PROTEIN: 6.5 G/DL (ref 6.4–8.3)
WBC # BLD: 12.5 K/UL (ref 3.5–11)
WBC # BLD: ABNORMAL 10*3/UL

## 2019-04-09 PROCEDURE — 6360000002 HC RX W HCPCS: Performed by: INTERNAL MEDICINE

## 2019-04-09 PROCEDURE — 96368 THER/DIAG CONCURRENT INF: CPT

## 2019-04-09 PROCEDURE — 96367 TX/PROPH/DG ADDL SEQ IV INF: CPT

## 2019-04-09 PROCEDURE — 80053 COMPREHEN METABOLIC PANEL: CPT

## 2019-04-09 PROCEDURE — 86301 IMMUNOASSAY TUMOR CA 19-9: CPT

## 2019-04-09 PROCEDURE — 96413 CHEMO IV INFUSION 1 HR: CPT

## 2019-04-09 PROCEDURE — 96416 CHEMO PROLONG INFUSE W/PUMP: CPT

## 2019-04-09 PROCEDURE — 2580000003 HC RX 258: Performed by: INTERNAL MEDICINE

## 2019-04-09 PROCEDURE — 96415 CHEMO IV INFUSION ADDL HR: CPT

## 2019-04-09 PROCEDURE — 96375 TX/PRO/DX INJ NEW DRUG ADDON: CPT

## 2019-04-09 PROCEDURE — 99214 OFFICE O/P EST MOD 30 MIN: CPT | Performed by: INTERNAL MEDICINE

## 2019-04-09 PROCEDURE — 36591 DRAW BLOOD OFF VENOUS DEVICE: CPT

## 2019-04-09 PROCEDURE — 85025 COMPLETE CBC W/AUTO DIFF WBC: CPT

## 2019-04-09 RX ORDER — DIPHENHYDRAMINE HYDROCHLORIDE 50 MG/ML
50 INJECTION INTRAMUSCULAR; INTRAVENOUS ONCE
Status: CANCELLED | OUTPATIENT
Start: 2019-05-07

## 2019-04-09 RX ORDER — SODIUM CHLORIDE 9 MG/ML
INJECTION, SOLUTION INTRAVENOUS CONTINUOUS
Status: CANCELLED | OUTPATIENT
Start: 2019-04-23

## 2019-04-09 RX ORDER — SODIUM CHLORIDE 0.9 % (FLUSH) 0.9 %
10 SYRINGE (ML) INJECTION PRN
Status: DISCONTINUED | OUTPATIENT
Start: 2019-04-09 | End: 2019-04-10 | Stop reason: HOSPADM

## 2019-04-09 RX ORDER — SODIUM CHLORIDE 0.9 % (FLUSH) 0.9 %
10 SYRINGE (ML) INJECTION PRN
Status: CANCELLED | OUTPATIENT
Start: 2019-04-23

## 2019-04-09 RX ORDER — SODIUM CHLORIDE 9 MG/ML
INJECTION, SOLUTION INTRAVENOUS ONCE
Status: CANCELLED | OUTPATIENT
Start: 2019-04-23

## 2019-04-09 RX ORDER — 0.9 % SODIUM CHLORIDE 0.9 %
10 VIAL (ML) INJECTION ONCE
Status: CANCELLED | OUTPATIENT
Start: 2019-05-07

## 2019-04-09 RX ORDER — ATROPINE SULFATE 0.4 MG/ML
0.4 AMPUL (ML) INJECTION
Status: CANCELLED | OUTPATIENT
Start: 2019-04-23

## 2019-04-09 RX ORDER — SODIUM CHLORIDE 0.9 % (FLUSH) 0.9 %
5 SYRINGE (ML) INJECTION PRN
Status: CANCELLED | OUTPATIENT
Start: 2019-05-07

## 2019-04-09 RX ORDER — SODIUM CHLORIDE 0.9 % (FLUSH) 0.9 %
10 SYRINGE (ML) INJECTION PRN
Status: CANCELLED | OUTPATIENT
Start: 2019-05-07

## 2019-04-09 RX ORDER — SODIUM CHLORIDE 9 MG/ML
INJECTION, SOLUTION INTRAVENOUS CONTINUOUS
Status: CANCELLED | OUTPATIENT
Start: 2019-05-07

## 2019-04-09 RX ORDER — METHYLPREDNISOLONE SODIUM SUCCINATE 125 MG/2ML
125 INJECTION, POWDER, LYOPHILIZED, FOR SOLUTION INTRAMUSCULAR; INTRAVENOUS ONCE
Status: CANCELLED | OUTPATIENT
Start: 2019-05-07

## 2019-04-09 RX ORDER — METHYLPREDNISOLONE SODIUM SUCCINATE 125 MG/2ML
125 INJECTION, POWDER, LYOPHILIZED, FOR SOLUTION INTRAMUSCULAR; INTRAVENOUS ONCE
Status: CANCELLED | OUTPATIENT
Start: 2019-04-23

## 2019-04-09 RX ORDER — PALONOSETRON 0.05 MG/ML
0.25 INJECTION, SOLUTION INTRAVENOUS ONCE
Status: COMPLETED | OUTPATIENT
Start: 2019-04-09 | End: 2019-04-09

## 2019-04-09 RX ORDER — DEXTROSE MONOHYDRATE 50 MG/ML
INJECTION, SOLUTION INTRAVENOUS ONCE
Status: CANCELLED | OUTPATIENT
Start: 2019-05-07

## 2019-04-09 RX ORDER — PALONOSETRON 0.05 MG/ML
0.25 INJECTION, SOLUTION INTRAVENOUS ONCE
Status: CANCELLED | OUTPATIENT
Start: 2019-04-23

## 2019-04-09 RX ORDER — HEPARIN SODIUM (PORCINE) LOCK FLUSH IV SOLN 100 UNIT/ML 100 UNIT/ML
500 SOLUTION INTRAVENOUS PRN
Status: CANCELLED | OUTPATIENT
Start: 2019-04-23

## 2019-04-09 RX ORDER — SODIUM CHLORIDE 0.9 % (FLUSH) 0.9 %
5 SYRINGE (ML) INJECTION PRN
Status: CANCELLED | OUTPATIENT
Start: 2019-04-23

## 2019-04-09 RX ORDER — HEPARIN SODIUM (PORCINE) LOCK FLUSH IV SOLN 100 UNIT/ML 100 UNIT/ML
500 SOLUTION INTRAVENOUS PRN
Status: DISCONTINUED | OUTPATIENT
Start: 2019-04-09 | End: 2019-04-10 | Stop reason: HOSPADM

## 2019-04-09 RX ORDER — DEXTROSE MONOHYDRATE 50 MG/ML
INJECTION, SOLUTION INTRAVENOUS ONCE
Status: CANCELLED | OUTPATIENT
Start: 2019-04-23

## 2019-04-09 RX ORDER — DEXAMETHASONE SODIUM PHOSPHATE 10 MG/ML
10 INJECTION INTRAMUSCULAR; INTRAVENOUS ONCE
Status: COMPLETED | OUTPATIENT
Start: 2019-04-09 | End: 2019-04-09

## 2019-04-09 RX ORDER — PALONOSETRON 0.05 MG/ML
0.25 INJECTION, SOLUTION INTRAVENOUS ONCE
Status: CANCELLED | OUTPATIENT
Start: 2019-05-07

## 2019-04-09 RX ORDER — 0.9 % SODIUM CHLORIDE 0.9 %
10 VIAL (ML) INJECTION ONCE
Status: CANCELLED | OUTPATIENT
Start: 2019-04-23

## 2019-04-09 RX ORDER — ATROPINE SULFATE 0.4 MG/ML
0.4 AMPUL (ML) INJECTION
Status: COMPLETED | OUTPATIENT
Start: 2019-04-09 | End: 2019-04-09

## 2019-04-09 RX ORDER — SODIUM CHLORIDE 9 MG/ML
INJECTION, SOLUTION INTRAVENOUS ONCE
Status: CANCELLED | OUTPATIENT
Start: 2019-05-07

## 2019-04-09 RX ORDER — ATROPINE SULFATE 0.4 MG/ML
0.4 AMPUL (ML) INJECTION
Status: CANCELLED | OUTPATIENT
Start: 2019-05-07

## 2019-04-09 RX ORDER — HEPARIN SODIUM (PORCINE) LOCK FLUSH IV SOLN 100 UNIT/ML 100 UNIT/ML
500 SOLUTION INTRAVENOUS PRN
Status: CANCELLED | OUTPATIENT
Start: 2019-05-07

## 2019-04-09 RX ORDER — DIPHENHYDRAMINE HYDROCHLORIDE 50 MG/ML
50 INJECTION INTRAMUSCULAR; INTRAVENOUS ONCE
Status: CANCELLED | OUTPATIENT
Start: 2019-04-23

## 2019-04-09 RX ORDER — DEXTROSE MONOHYDRATE 50 MG/ML
INJECTION, SOLUTION INTRAVENOUS ONCE
Status: COMPLETED | OUTPATIENT
Start: 2019-04-09 | End: 2019-04-09

## 2019-04-09 RX ADMIN — FLUOROURACIL 3750 MG: 50 INJECTION, SOLUTION INTRAVENOUS at 13:46

## 2019-04-09 RX ADMIN — PALONOSETRON HYDROCHLORIDE 0.25 MG: 0.25 INJECTION, SOLUTION INTRAVENOUS at 10:59

## 2019-04-09 RX ADMIN — DEXTROSE MONOHYDRATE 600 MG: 50 INJECTION, SOLUTION INTRAVENOUS at 11:58

## 2019-04-09 RX ADMIN — IRINOTECAN HYDROCHLORIDE 280 MG: 40 INJECTION, SOLUTION INTRAVENOUS at 11:58

## 2019-04-09 RX ADMIN — SODIUM CHLORIDE 150 MG: 900 INJECTION, SOLUTION INTRAVENOUS at 11:11

## 2019-04-09 RX ADMIN — DEXTROSE MONOHYDRATE: 50 INJECTION, SOLUTION INTRAVENOUS at 10:58

## 2019-04-09 RX ADMIN — Medication 10 MG: at 11:00

## 2019-04-09 RX ADMIN — ATROPINE SULFATE 0.4 MG: 0.4 INJECTION, SOLUTION INTRAMUSCULAR; INTRAVENOUS; SUBCUTANEOUS at 11:49

## 2019-04-09 NOTE — PROGRESS NOTES
DIAGNOSIS:   1. Pancreatic invasive ductal adenocarcinoma, stage III  2. 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 FOLFIRINOX - started 12/04/2018, received three cycles. With radiologic response  2. Palliative therapy / rehab for the LE weakness     BRIEF CASE HISTORY:  Milton Lyons is a very pleasant 66 y.o. female who is referred to us for pancreatic cancer. She reports abdominal pain started 09/2018, worse at night, she consulted with PCP and she started acid reducer. She was referred to GI and EGD and colonoscopy was done - both negative, CT was done showing mass in the body of the pancreas measuring 3.6 x 2.6 x 2.5 cm. She underwent endoscopic ultrasound for biopsy, pathology confirmed pancreatic invasive ductal adenocarcinoma. She has had some weight loss, she has had a lot of stress and is unsure if it is disease related. She has had insomnia due to discomfort. She has consult scheduled with surgeon. She underwent diagnostic laparoscopy. Mother had breast cancer. She has no history of chemical or radiation exposure. Patient was seen by Nai justice for laparoscopy. Intraoperatively, a liver lesion was appreciated. Biopsy was negative for metastatic disease. The patient was found to have locally advanced disease and the recommendation was to proceed with neoadjuvant chemotherapy and possibly radiation depending on response. Treat with FOLFIRINOX was started 12/04/2018, she experienced significant nausea and opoid induced constipation. Following cycle #3 she underwent celiac block, she experienced significant neuropathy and work up indicated cord infarction. She was released to rehab facility and is undergoing PT. CT done 01/22/2019 showed decrease in pancreatic mass. Decided to hold oxaliplatin for 2 cycles to allow neurological recovery, continue holding.     INTERIM HISTORY: The patient presents today for follow up of pancreatic cancer and cycle #8 of treatment. Her mobility continues to improve, steps are still difficult. She has had some constipation and is managing. Her nausea is improved with Zyprexa. Her appetite remains stable but poor, weight is stable. She has follow up with Dr. Donna Peguero 04/23/2019. Her bedsore is nearly closed, she continue with wound care. Sore on right heel has completely healed. She denies heartburn. PAST MEDICAL HISTORY: has a past medical history of Asthma, Cancer ([facility] [address] Congenital heart disease, Gastritis without bleeding, History of abnormal cervical Pap smear, Hyperlipidemia, Pressure ulcer of coccygeal region, stage 2, and Seasonal allergies. PAST SURGICAL HISTORY: has a past surgical history that includes Breast reduction surgery (1982); Colonoscopy (2002); Breast biopsy (2009); Endoscopy, colon, diagnostic; pr gi endoscopic ultrasound (N/A, 11/7/2018); TUNNELED CENTRAL VENOUS CATHETER W/ SUBCUTANEOUS PORT; and laparoscopy. CURRENT MEDICATIONS:  has a current medication list which includes the following prescription(s): oxycodone hcl, olanzapine, prochlorperazine, promethazine, aspirin, sennosides-docusate sodium, lidocaine-prilocaine, and ondansetron. ALLERGIES:  has No Known Allergies. FAMILY HISTORY: Mother: breast cancer    SOCIAL HISTORY:  reports that she has never smoked. She has never used smokeless tobacco. She reports that she does not drink alcohol or use drugs. REVIEW OF SYSTEMS:   General: No fever or night sweats. Weight stable, appetite stable but poor  ENT: No double or blurred vision, no tinnitus or hearing problem, no dysphagia or sore throat   Respiratory: No chest pain, no shortness of breath, no cough or hemoptysis. Cardiovascular: Denies chest pain, PND or orthopnea or palpitations. + LE swelling  Gastrointestinal: No vomiting, diarrhea, ++nausea - controlled.  + abdominal pain with constipation - managed  Genitourinary: Denies dysuria, hematuria, frequency, urgency or incontinence. Neurological: Denies headaches, decreased LOC, no sensory or motor focal deficits. Sensory neuropathy around L2 - improving. Musculoskeletal: No arthralgia no back pain or joint swelling. Skin: There are no rashes or bleeding.+grade 1 bedsore in sacral area - improving; wound on right heel is healed  Psychiatric:  No anxiety, no depression. Endocrine: No diabetes or thyroid disease. Hematologic: No bleeding, no adenopathy. PHYSICAL EXAM: Shows a well appearing 66y.o.-year-old female who is not in pain or distress. Vital Signs: Blood pressure 134/88, pulse 102, temperature 98.1 °F (36.7 °C), temperature source Oral, resp. rate 18, weight 114 lb 4.8 oz (51.8 kg), last menstrual period 09/07/2012, not currently breastfeeding. HEENT: Normocephalic and atraumatic. Pupils are equal, round, reactive to light and accommodation. Extraocular muscles are intact. Neck: Showed no JVD, no carotid bruit . Lungs: Clear to auscultation bilaterally. Heart: Regular without any murmur. Abdomen: Soft, nontender. No hepatosplenomegaly. Surgery for laparoscopy is healing well. Extremities: Lower extremities show +1 edema on the left and trace on the right, pressure sore on medial aspect of right heel - completely healed. No clubbing, or cyanosis. Breasts: Examination not done today.  Neuro exam: intact cranial nerves bilaterally, she continues to have weakness and numbness in both lower extremities but continued improvement compared to last exam Lymphatic: no adenopathy appreciated in the supraclavicular, axillary, cervical or inguinal area  Skin: grade 1 bedsore in sacral area    REVIEW OF LABORATORY DATA:   Lab Results   Component Value Date    WBC 12.5 (H) 04/09/2019    HGB 10.8 (L) 04/09/2019    HCT 32.8 (L) 04/09/2019    MCV 94.4 04/09/2019     04/09/2019     Lab Results   Component Value Date    NEUTROABS 10.30 (H) 04/09/2019         Chemistry        Component Value Date/Time     04/09/2019 0953    K 4.1 04/09/2019 0953     04/09/2019 0953    CO2 26 04/09/2019 0953    BUN 12 04/09/2019 0953    CREATININE 0.54 04/09/2019 0953        Component Value Date/Time    CALCIUM 9.3 04/09/2019 0953    ALKPHOS 106 (H) 04/09/2019 0953    AST 26 04/09/2019 0953    ALT 22 04/09/2019 0953    BILITOT 0.22 (L) 04/09/2019 0953              PATHOLOGY:     REVIEW OF RADIOLOGICAL RESULTS:     PATHOLOGY:       IMPRESSION:   1. Invasive ductal adenocarcinoma of the pancreas, stage III  2. Uche adjuvant FOLFIRINOX started 12/04/2018  3. Opioid induced constipation, stool compaction - taking Senakot to manage  4. Plan to resume chemo 02/12/2019, plan for 6 cycles and then evaluation   5. Due to neuropathic risk, we will hold oxaliplatin while recovering neurologically   6. Grade 1 bedsore on sacrum, refer to wound care - continuing to heal  7. Severe nausea - add Zyprexa - well controlled  8. Plan for imaging following cycle #8    PLAN:   1. We reviewed her lab work, her counts are stable. 2. I completed toxicity check - some nausea and constipation. 3. We discussed plan for imaging following cycle #8 and further treatment based on results. She has a great response and she is resectable, she should move to surgery. Otherwise, options for chemoradiation, changing chemotherapy or clinical trials would be discussed. 4. Treat today as planned. 5. I am ordering pancreatic protocol CT. she will have an appointment with Avinash Metzger in 2 weeks, based on the result of the CT scan, we will discuss options including surgery, chemo radiation or further chemotherapy. I will reach out to Dr. Negro Bedolla after the CT scan. 6. Based on the degree of response in the tumor marker, I'm very optimistic that she has a great clinical and radiological response. 7. Return in 3 week, reschedule if surgery is scheduled.

## 2019-04-09 NOTE — PROGRESS NOTES
Pt here for C8D.1. Pt seen by Dr. Wynne August prior to treatment. Lab results reviewed. Pt was treated without incident and d/c'd in stable condition. Pt will return in 2 days for pump d/c.

## 2019-04-09 NOTE — PATIENT INSTRUCTIONS
Proceed with chemo per orders.  rv in 3 weeks with potential chemo  Need Ct scan on Friday 4/19   Need CA 19-9 with todays labs

## 2019-04-10 ENCOUNTER — TELEPHONE (OUTPATIENT)
Dept: ONCOLOGY | Age: 59
End: 2019-04-10

## 2019-04-11 ENCOUNTER — HOSPITAL ENCOUNTER (OUTPATIENT)
Dept: INFUSION THERAPY | Age: 59
Discharge: HOME | End: 2019-04-11
Payer: COMMERCIAL

## 2019-04-11 DIAGNOSIS — C25.1 MALIGNANT NEOPLASM OF BODY OF PANCREAS (HCC): Primary | ICD-10-CM

## 2019-04-11 PROCEDURE — 2580000003 HC RX 258: Performed by: INTERNAL MEDICINE

## 2019-04-11 PROCEDURE — 96372 THER/PROPH/DIAG INJ SC/IM: CPT

## 2019-04-11 PROCEDURE — 6360000002 HC RX W HCPCS: Performed by: INTERNAL MEDICINE

## 2019-04-11 PROCEDURE — 96523 IRRIG DRUG DELIVERY DEVICE: CPT

## 2019-04-11 RX ORDER — SODIUM CHLORIDE 0.9 % (FLUSH) 0.9 %
20 SYRINGE (ML) INJECTION PRN
Status: CANCELLED | OUTPATIENT
Start: 2019-04-11

## 2019-04-11 RX ORDER — SODIUM CHLORIDE 0.9 % (FLUSH) 0.9 %
10 SYRINGE (ML) INJECTION PRN
Status: DISCONTINUED | OUTPATIENT
Start: 2019-04-11 | End: 2019-04-12 | Stop reason: HOSPADM

## 2019-04-11 RX ORDER — HEPARIN SODIUM (PORCINE) LOCK FLUSH IV SOLN 100 UNIT/ML 100 UNIT/ML
500 SOLUTION INTRAVENOUS PRN
Status: CANCELLED | OUTPATIENT
Start: 2019-04-11

## 2019-04-11 RX ORDER — HEPARIN SODIUM (PORCINE) LOCK FLUSH IV SOLN 100 UNIT/ML 100 UNIT/ML
500 SOLUTION INTRAVENOUS PRN
Status: DISCONTINUED | OUTPATIENT
Start: 2019-04-11 | End: 2019-04-12 | Stop reason: HOSPADM

## 2019-04-11 RX ORDER — SODIUM CHLORIDE 0.9 % (FLUSH) 0.9 %
10 SYRINGE (ML) INJECTION PRN
Status: CANCELLED | OUTPATIENT
Start: 2019-04-11

## 2019-04-11 RX ADMIN — Medication 500 UNITS: at 14:00

## 2019-04-11 RX ADMIN — PEGFILGRASTIM 6 MG: 6 INJECTION SUBCUTANEOUS at 14:03

## 2019-04-11 RX ADMIN — Medication 10 ML: at 14:00

## 2019-04-11 NOTE — PROGRESS NOTES
Patient here for pump dc. Infusion complete. Patient got neulasta. Patient tolerated injection well and was discharged home with spouse in stable condition.

## 2019-04-19 ENCOUNTER — HOSPITAL ENCOUNTER (OUTPATIENT)
Dept: CT IMAGING | Age: 59
Discharge: HOME | End: 2019-04-21
Payer: COMMERCIAL

## 2019-04-19 DIAGNOSIS — C25.1 MALIGNANT NEOPLASM OF BODY OF PANCREAS (HCC): ICD-10-CM

## 2019-04-19 PROCEDURE — 2580000003 HC RX 258: Performed by: INTERNAL MEDICINE

## 2019-04-19 PROCEDURE — 6360000004 HC RX CONTRAST MEDICATION: Performed by: INTERNAL MEDICINE

## 2019-04-19 PROCEDURE — 74160 CT ABDOMEN W/CONTRAST: CPT

## 2019-04-19 PROCEDURE — 6360000002 HC RX W HCPCS: Performed by: INTERNAL MEDICINE

## 2019-04-19 RX ORDER — SODIUM CHLORIDE 0.9 % (FLUSH) 0.9 %
10 SYRINGE (ML) INJECTION PRN
Status: DISCONTINUED | OUTPATIENT
Start: 2019-04-19 | End: 2019-04-22 | Stop reason: HOSPADM

## 2019-04-19 RX ORDER — 0.9 % SODIUM CHLORIDE 0.9 %
80 INTRAVENOUS SOLUTION INTRAVENOUS ONCE
Status: COMPLETED | OUTPATIENT
Start: 2019-04-19 | End: 2019-04-19

## 2019-04-19 RX ORDER — HEPARIN SODIUM (PORCINE) LOCK FLUSH IV SOLN 100 UNIT/ML 100 UNIT/ML
500 SOLUTION INTRAVENOUS PRN
Status: DISCONTINUED | OUTPATIENT
Start: 2019-04-19 | End: 2019-04-22 | Stop reason: HOSPADM

## 2019-04-19 RX ADMIN — Medication 10 ML: at 14:26

## 2019-04-19 RX ADMIN — SODIUM CHLORIDE 80 ML: 9 INJECTION, SOLUTION INTRAVENOUS at 14:26

## 2019-04-19 RX ADMIN — IOVERSOL 100 ML: 741 INJECTION INTRA-ARTERIAL; INTRAVENOUS at 14:25

## 2019-04-19 RX ADMIN — HEPARIN 500 UNITS: 100 SYRINGE at 14:36

## 2019-04-22 ENCOUNTER — HOSPITAL ENCOUNTER (OUTPATIENT)
Dept: WOUND CARE | Age: 59
Discharge: HOME | End: 2019-04-22
Payer: COMMERCIAL

## 2019-04-22 VITALS
BODY MASS INDEX: 19.63 KG/M2 | HEART RATE: 76 BPM | TEMPERATURE: 97.5 F | WEIGHT: 115 LBS | HEIGHT: 64 IN | SYSTOLIC BLOOD PRESSURE: 94 MMHG | RESPIRATION RATE: 18 BRPM | DIASTOLIC BLOOD PRESSURE: 68 MMHG

## 2019-04-22 DIAGNOSIS — L89.152 PRESSURE ULCER OF COCCYGEAL REGION, STAGE 2 (HCC): Primary | ICD-10-CM

## 2019-04-22 PROCEDURE — 6370000000 HC RX 637 (ALT 250 FOR IP): Performed by: NURSE PRACTITIONER

## 2019-04-22 PROCEDURE — 99212 OFFICE O/P EST SF 10 MIN: CPT | Performed by: NURSE PRACTITIONER

## 2019-04-22 PROCEDURE — 99212 OFFICE O/P EST SF 10 MIN: CPT

## 2019-04-22 RX ORDER — OXYCODONE HYDROCHLORIDE 10 MG/1
10 TABLET ORAL EVERY 4 HOURS PRN
COMMUNITY
End: 2019-07-30 | Stop reason: SDUPTHER

## 2019-04-22 RX ORDER — LIDOCAINE HYDROCHLORIDE 40 MG/ML
SOLUTION TOPICAL ONCE
Status: COMPLETED | OUTPATIENT
Start: 2019-04-22 | End: 2019-04-22

## 2019-04-22 RX ADMIN — LIDOCAINE HYDROCHLORIDE: 40 SOLUTION TOPICAL at 13:03

## 2019-04-22 ASSESSMENT — PAIN SCALES - GENERAL: PAINLEVEL_OUTOF10: 0

## 2019-04-22 NOTE — PROGRESS NOTES
[facility]   Progress Note and Procedure Note      Jackie Castellano  MEDICAL RECORD NUMBER:  [id number]  AGE: 66 y.o. GENDER: female  : 1960  EPISODE DATE:  2019    Subjective:     Chief Complaint   Patient presents with    Wound Check         HISTORY of PRESENT ILLNESS HPI     Jackie Castellano is a 66 y.o. female who presents today for wound/ulcer evaluation. History of Wound Context: 66year old female with coccyx wound. She states the wound has been present for about 6 weeks. She was using a zinc cream to the area with no improvement. She has a cushion in her wheelchair, but not any cushion in her recliner that she spends most of her time in at home, so she was given an air cushion for her chair at home. She has been using juan to the wound bed with a mepilex foam dressing to cover. Wound is healed this week. She does have a  blister to the right medial heel that is dry. She was informed this is a effect of the chemotherapy.        Wound/Ulcer Pain Timing/Severity: none  Quality of pain: N/A  Severity:  0 / 10   Modifying Factors: None  Associated Signs/Symptoms: none    Ulcer Identification:  Ulcer Type: pressure  Contributing Factors: chronic pressure, decreased mobility, shear force, immunosuppression and malnutrition      PAST MEDICAL HISTORY        Diagnosis Date    Asthma     Cancer ([facility] [address]     pancreatic ca  dx 11/15/18    Congenital heart disease     Gastritis without bleeding 10/18/2018    History of abnormal cervical Pap smear     Pre-Cancerous Cells    Hyperlipidemia     ratio ok    Pressure ulcer of coccygeal region, stage 2 2019    Seasonal allergies        PAST SURGICAL HISTORY    Past Surgical History:   Procedure Laterality Date    BREAST BIOPSY  2009    BREAST REDUCTION SURGERY  1982    COLONOSCOPY  2002    ENDOSCOPY, COLON, DIAGNOSTIC      LAPAROSCOPY      [place] [facility] ENDOSCOPIC ULTRASOUND N/A 2018    ENDOSCOPIC ULTRASOUND WITH FNA PANCREAS performed by Hunter Lay MD at [address] W/ SUBCUTANEOUS PORT         FAMILY HISTORY    Family History   Problem Relation Age of Onset   Lopez Breast Cancer Mother 64    Heart Disease Father     Coronary Art Dis Father     Ovarian Cancer Maternal Grandmother 79    Pancreatic Cancer Paternal Uncle 80    Cancer Paternal Uncle        SOCIAL HISTORY    Social History     Tobacco Use    Smoking status: Never Smoker    Smokeless tobacco: Never Used   Substance Use Topics    Alcohol use: No    Drug use: No       ALLERGIES    No Known Allergies    MEDICATIONS    Current Outpatient Medications on File Prior to Encounter   Medication Sig Dispense Refill    oxyCODONE HCl (OXY-IR) 10 MG immediate release tablet Take 10 mg by mouth every 4 hours as needed for Pain.  medical marijuana Take 1 each by mouth as needed (pain control). Through [facility] dispens[facility].  OLANZapine (ZYPREXA) 5 MG tablet Take 5 mg by mouth      prochlorperazine (COMPAZINE) 10 MG tablet Take 1 tablet by mouth every 6 hours as needed for Nausea 120 tablet 2    promethazine (PHENERGAN) 12.5 MG tablet Take 1 tablet by mouth every 6 hours as needed for Nausea 120 tablet 2    aspirin 81 MG chewable tablet Take 81 mg by mouth daily      sennosides-docusate sodium (SENOKOT-S) 8.6-50 MG tablet Take 1 tablet by mouth daily as needed for Constipation      lidocaine-prilocaine (EMLA) 2.5-2.5 % cream Apply topically as needed. Apply a quarter size amount to port site 1 hour before chemotherapy appointment. Cover with plastic wrap. 30 g 1    ondansetron (ZOFRAN-ODT) 8 MG TBDP disintegrating tablet Take 1 tablet by mouth every 8 hours as needed for Nausea or Vomiting 90 tablet 3     No current facility-administered medications on file prior to encounter. REVIEW OF SYSTEMS    Pertinent items are noted in HPI.     Objective:     Problem List Items Addressed This Visit     Pressure ulcer of coccygeal region, stage 2 - Primary           BP 94/68   Pulse 76   Temp 97.5 °F (36.4 °C) (Tympanic)   Resp 18   Ht 5' 4\" (1.626 m)   Wt 115 lb (52.2 kg)   LMP 09/07/2012   BMI 19.74 kg/m²       Wt Readings from Last 3 Encounters:   04/22/19 115 lb (52.2 kg)   04/09/19 114 lb 4.8 oz (51.8 kg)   04/02/19 114 lb (51.7 kg)       PHYSICAL EXAM    General Appearance: alert and oriented to person, place and time, well-developed and well-nourished, in no acute distress and alert and oriented to person, place and time  Skin: warm and dry and healed wound to coccyx      Assessment:         Procedure Note  Indications:  Based on my examination of this patient's wound(s)/ulcer(s) today, debridement is not required to promote healing and evaluate the wound base.     Measurements:  Wound/Ulcer Descriptions are Pre Debridement except measurements:    Wound 02/28/19 Coccyx Wound #1  (Active)   Wound Image   2/28/2019 10:49 AM   Wound Pressure Stage  3 4/22/2019  1:02 PM   Dressing Status Dry 4/22/2019  1:02 PM   Dressing Changed Changed/New 4/22/2019  1:02 PM   Wound Cleansed Rinsed/Irrigated with saline 4/22/2019  1:02 PM   Wound Length (cm) 0.4 cm 4/22/2019  1:02 PM   Wound Width (cm) 0.3 cm 4/22/2019  1:02 PM   Wound Depth (cm) 0.1 cm 4/22/2019  1:02 PM   Wound Surface Area (cm^2) 0.12 cm^2 4/22/2019  1:02 PM   Change in Wound Size % (l*w) 94 4/22/2019  1:02 PM   Wound Volume (cm^3) 0.01 cm^3 4/22/2019  1:02 PM   Wound Healing % 98 4/22/2019  1:02 PM   Post-Procedure Length (cm) 0 cm 4/22/2019  1:02 PM   Post-Procedure Width (cm) 0 cm 4/22/2019  1:02 PM   Post-Procedure Depth (cm) 0 cm 4/22/2019  1:02 PM   Post-Procedure Surface Area (cm^2) 0 cm^2 4/22/2019  1:02 PM   Post-Procedure Volume (cm^3) 0 cm^3 4/22/2019  1:02 PM   Wound Assessment Clean;Dry 4/22/2019  1:02 PM   Drainage Amount None 4/22/2019  1:02 PM   Drainage Description Serous 4/2/2019  1:11 PM   Odor None 4/22/2019  1:02 PM   Margins Defined edges 4/22/2019  1:02 PM Natalia-wound Assessment McCarr 4/2/2019  1:11 PM   McCarr%Wound Bed 80 4/2/2019  1:11 PM   Red%Wound Bed 20 4/2/2019  1:11 PM   Yellow%Wound Bed 80 3/19/2019  1:17 PM   Purple%Wound Bed 0 3/5/2019  1:08 PM   Number of days: 53          Percent of Wound(s)/Ulcer(s) Debrided: 0%    Total Surface Area Debrided:  0 sq cm           Plan:     Treatment Note please see attached Discharge Instructions    Written patient dismissal instructions given to patient and signed by patient or POA. Discharge Instructions         [address] [place] [address] [facility]  [facility]ENTER                                 Visit Cecilia Instructions / Physician Orders  DATE: 04/22/19     Home Care:      SUPPLIES ORDERED THRU:  [facility] ([facility])     Wound Location:  Coccyx-HEALED     Cleanse with:  Liquid antibacterial soap and water, rinse well     Dressing Orders: mepilex border x 1 week                                   Frequency: Every 3 days     Additional Orders: Increase protein to diet (meat, cheese, eggs, fish, peanut butter, nuts and beans)  Multivitamin daily     HYPERBARIC TREATMENT-                TREATMENT #     Your next appointment with Wound Care Center-call if needed     ROOM TYPE   [] CHAIR     [] BED   [] EITHER        TIME [] 45 MIN     [] 60 MIN     (Please note your next appointment above and if you are unable to keep, kindly give a 24 hour notice.  Thank you.)     If you experience any of the following, please call the [address] [place] [address] [facility] during business hours:  [phone]     * Increase in Pain  * Temperature over 101  * Increase in drainage from your wound  * Drainage with a foul odor  * Bleeding  * Increase in swelling  * Need for compression bandage changes due to slippage, breakthrough drainage.     If you need medical attention outside of the business hours of the [facility] please contact your PCP or go to the nearest emergency room.     AVS REVIEWED    YES [x]     Patient Signature:__________________________________________________Date:_______  Electronically signed by Sofya Logan RN on 4/22/2019 at 1:09 PM  Electronically signed by BREANNA Iqbal CNP on 4/22/2019 at 1:11 PM          Electronically signed by BREANNA Iqbal CNP on 4/22/2019 at 1:16 PM

## 2019-04-22 NOTE — DISCHARGE INSTR - COC
Continuity of Care Form    Patient Name: Jolly Chamorro   :  1960  MRN:  [id number]    Admit date:  2019  Discharge date:  ***    Code Status Order: Prior   Advance Directives:   [facility] Documentation     Date/Time Healthcare Directive Type of Healthcare Directive Copy in [address] Agent's Name Healthcare Agent's Phone Number    [phone]  Yes, patient has an advance directive for healthcare treatment  Durable power of  for health care;Living will  No, copy requested from family  Legal Guardian -- --          Admitting Physician:  No admitting provider for patient encounter. PCP: Jackie Allen    Discharging Nurse: [facility] Unit/Room#: No information available for this encounter. Discharging Unit Phone Number: ***    Emergency Contact:   Extended Emergency Contact Information  Primary Emergency Contact: Chad Avery ([facility]Sim  Address: [place] [place]  Sandeep Magaña  [address] Phone: [phone]  Work Phone: [phone]  Mobile Phone: [phone]  Relation: Spouse   needed? No  Secondary Emergency Contact: Farrah Arriola (hipaa)Angel Luis  Address: [address] [place] [address] Sandeep Magaña  [address] Phone: [phone]  Work Phone: [phone]  Mobile Phone: [phone]  Relation: Parent   needed?  No    Past Surgical History:  Past Surgical History:   Procedure Laterality Date    BREAST BIOPSY  2009    BREAST REDUCTION SURGERY  1982    COLONOSCOPY  2002    ENDOSCOPY, COLON, DIAGNOSTIC      LAPAROSCOPY      [place] [facility] ENDOSCOPIC ULTRASOUND N/A 2018    ENDOSCOPIC ULTRASOUND WITH FNA PANCREAS performed by Davonet Strickland MD at [address] [facility] W/ SUBCUTANEOUS PORT         Immunization History:   Immunization History   Administered Date(s) Administered    Tdap (Boostrix, Adacel) 2014       Active Problems:  Patient Active Problem List   Diagnosis Code    Gastritis without bleeding K29.70    Malignant neoplasm of body of pancreas (HCC) C25.1    Anxiety F41.9    CINV (chemotherapy-induced nausea and vomiting) R11.2, T45.1X5A    Epigastric pain R10.13    Weakness R53.1    Abnormal loss of weight R63.4    Partial paralysis of both lower limbs (HCC) G82.20    Moderate protein-calorie malnutrition (HCC) E44.0    Spinal cord infarction (HCC) G95.11    Weakness of both lower extremities R29.898    Pressure ulcer of coccygeal region, stage 2 L89.152       Isolation/Infection:   Isolation          No Isolation            Nurse Assessment:  Last Vital Signs: BP 94/68   Pulse 76   Temp 97.5 °F (36.4 °C) (Tympanic)   Resp 18   Ht 5' 4\" (1.626 m)   Wt 115 lb (52.2 kg)   LMP 09/07/2012   BMI 19.74 kg/m²     Last documented pain score (0-10 scale): Pain Level: 0  Last Weight:   Wt Readings from Last 1 Encounters:   04/22/19 115 lb (52.2 kg)     Mental Status:  {IP PT MENTAL STATUS:04694}    IV Access:  { RANJANA IV ACCESS:148743286}    Nursing Mobility/ADLs:  Walking   {P DME NEAA:814335253}  Transfer  {[facility] DME XIXM:788100261}  Bathing  {P DME TLLF:799745254}  Dressing  {P DME AYHW:277391191}  Toileting  {P DME DLNP:398480034}  Feeding  {[facility] DME XPRE:547103680}  Med Admin  {[facility] DME LQGV:059417625}  Med Delivery   {[facility] [place] Delivery:234845832}    Wound Care Documentation and Therapy:  Wound 02/28/19 Coccyx Wound #1  (Active)   Wound Image   2/28/2019 10:49 AM   Wound Pressure Stage  3 4/22/2019  1:02 PM   Dressing Status Dry 4/22/2019  1:02 PM   Dressing Changed Changed/New 4/22/2019  1:02 PM   Wound Cleansed Rinsed/Irrigated with saline 4/22/2019  1:02 PM   Wound Length (cm) 0.4 cm 4/22/2019  1:02 PM   Wound Width (cm) 0.3 cm 4/22/2019  1:02 PM   Wound Depth (cm) 0.1 cm 4/22/2019  1:02 PM   Wound Surface Area (cm^2) 0.12 cm^2 4/22/2019  1:02 PM   Change in Wound Size % (l*w) 94 4/22/2019  1:02 PM   Wound Volume (cm^3) 0.01 cm^3 2019  1:02 PM   Wound Healing % 98 2019  1:02 PM   Post-Procedure Length (cm) 0 cm 2019  1:02 PM   Post-Procedure Width (cm) 0 cm 2019  1:02 PM   Post-Procedure Depth (cm) 0 cm 2019  1:02 PM   Post-Procedure Surface Area (cm^2) 0 cm^2 2019  1:02 PM   Post-Procedure Volume (cm^3) 0 cm^3 2019  1:02 PM   Wound Assessment Clean;Dry 2019  1:02 PM   Drainage Amount None 2019  1:02 PM   Drainage Description Serous 2019  1:11 PM   Odor None 2019  1:02 PM   Margins Defined edges 2019  1:02 PM   Natalia-wound Assessment Pink 2019  1:11 PM   Nord%Wound Bed 80 2019  1:11 PM   Red%Wound Bed 20 2019  1:11 PM   Yellow%Wound Bed 80 3/19/2019  1:17 PM   Purple%Wound Bed 0 3/5/2019  1:08 PM   Number of days: 53        Elimination:  Continence:   · Bowel: {YES / JN:02249}  · Bladder: {YES / LE:46498}  Urinary Catheter: {Urinary Catheter:107878338}   Colostomy/Ileostomy/Ileal Conduit: {YES / D}       Date of Last BM: ***  No intake or output data in the 24 hours ending 19 1315  No intake/output data recorded.     Safety Concerns:     [employer] Safety Concerns:778360576}    Impairments/Disabilities:      [employer] Impairments/Disabilities:229574060}    Nutrition Therapy:  Current Nutrition Therapy:   [employer] Diet List:115817937}    Routes of Feeding: {CHP DME Other Feedings:994541314}  Liquids: {Slp liquid thickness:72142}  Daily Fluid Restriction: {CHP DME Yes amt example:578375395}  Last Modified Barium Swallow with Video (Video Swallowing Test): {Done Not Done WMXI:100719246}    Treatments at the Time of Hospital Discharge:   Respiratory Treatments: ***  Oxygen Therapy:  {Therapy; copd oxygen:55197}  Ventilator:    { CC Vent ZOIO:752373904}    Rehab Therapies: {THERAPEUTIC INTERVENTION:2822722987}  Weight Bearing Status/Restrictions: [address] [facility]ANT Weight Bearin}  Other Medical Equipment (for information only, NOT a DME order): {EQUIPMENT:655684997}  Other Treatments: ***    Patient's personal belongings (please select all that are sent with patient):  {CHP DME Belongings:251972629}    RN SIGNATURE:  {Esignature:949995257}    CASE MANAGEMENT/SOCIAL WORK SECTION    Inpatient Status Date: ***    Readmission Risk Assessment Score:  Readmission Risk              Risk of Unplanned Readmission:        0           Discharging to Facility/ Agency   · Name:   · Address:  · Phone:  · Fax:    Dialysis Facility (if applicable)   · Name:  · Address:  · Dialysis Schedule:  · Phone:  · Fax:    / signature: {Esignature:298586848}    PHYSICIAN SECTION    Prognosis: {Prognosis:8051309270}    Condition at Discharge: [address] [place] [address] Patient Condition:837398088}    Rehab Potential (if transferring to Rehab): {Prognosis:6406137330}    Recommended Labs or Other Treatments After Discharge: ***    Physician Certification: I certify the above information and transfer of Samson Carrasco  is necessary for the continuing treatment of the diagnosis listed and that she requires {Admit to Appropriate Level of Care:27398} for {GREATER/LESS:684654995} 30 days.      Update Admission H&P: {CHP DME Changes in FGGZV:987496635}    PHYSICIAN SIGNATURE:  {Esignature:828510892}

## 2019-04-23 ENCOUNTER — TELEPHONE (OUTPATIENT)
Dept: ONCOLOGY | Age: 59
End: 2019-04-23

## 2019-05-06 DIAGNOSIS — C25.1 MALIGNANT NEOPLASM OF BODY OF PANCREAS (HCC): ICD-10-CM

## 2019-05-06 DIAGNOSIS — F41.9 ANXIETY: ICD-10-CM

## 2019-05-06 RX ORDER — OXYCODONE HYDROCHLORIDE 10 MG/1
10 TABLET ORAL EVERY 6 HOURS PRN
Qty: 120 TABLET | Refills: 0 | Status: SHIPPED | OUTPATIENT
Start: 2019-05-06 | End: 2019-06-05

## 2019-05-20 ENCOUNTER — TELEPHONE (OUTPATIENT)
Dept: ONCOLOGY | Age: 59
End: 2019-05-20

## 2019-06-05 ENCOUNTER — TELEPHONE (OUTPATIENT)
Dept: ONCOLOGY | Age: 59
End: 2019-06-05

## 2019-06-05 NOTE — TELEPHONE ENCOUNTER
Name: Jolly Chamorro  : 1960  MRN: [id number]    Oncology Navigation Follow-Up Note    Contact Type:  Telephone  Notes: Spoke with Ed Vázquez, Dr. Packer [facility] office, inquired on pt status & requested op note/pathology. Samantha stated pt dc'd home, scheduled for Dr. Negro Bedolla f/u 19, & will fax records. Will continue to follow.     Electronically signed by David Warren RN on 2019 at 8:37 AM

## 2019-06-13 ENCOUNTER — HOSPITAL ENCOUNTER (OUTPATIENT)
Age: 59
Discharge: HOME | End: 2019-06-13
Payer: COMMERCIAL

## 2019-06-13 ENCOUNTER — TELEPHONE (OUTPATIENT)
Dept: ONCOLOGY | Age: 59
End: 2019-06-13

## 2019-06-13 ENCOUNTER — HOSPITAL ENCOUNTER (OUTPATIENT)
Dept: INFUSION THERAPY | Age: 59
Discharge: HOME | End: 2019-06-13
Payer: COMMERCIAL

## 2019-06-13 ENCOUNTER — OFFICE VISIT (OUTPATIENT)
Dept: ONCOLOGY | Age: 59
End: 2019-06-13
Payer: COMMERCIAL

## 2019-06-13 VITALS
HEART RATE: 84 BPM | TEMPERATURE: 97.4 F | BODY MASS INDEX: 18.69 KG/M2 | SYSTOLIC BLOOD PRESSURE: 116 MMHG | DIASTOLIC BLOOD PRESSURE: 82 MMHG | WEIGHT: 108.9 LBS

## 2019-06-13 DIAGNOSIS — G95.11 SPINAL CORD INFARCTION (HCC): ICD-10-CM

## 2019-06-13 DIAGNOSIS — R29.898 WEAKNESS OF BOTH LOWER EXTREMITIES: ICD-10-CM

## 2019-06-13 DIAGNOSIS — C25.1 MALIGNANT NEOPLASM OF BODY OF PANCREAS (HCC): Primary | ICD-10-CM

## 2019-06-13 LAB
ABSOLUTE EOS #: 0.2 K/UL (ref 0–0.4)
ABSOLUTE IMMATURE GRANULOCYTE: ABNORMAL K/UL (ref 0–0.3)
ABSOLUTE LYMPH #: 1.2 K/UL (ref 1–4.8)
ABSOLUTE MONO #: 0.4 K/UL (ref 0.1–1.2)
ALBUMIN SERPL-MCNC: 3.4 G/DL (ref 3.5–5.2)
ALBUMIN/GLOBULIN RATIO: 1.2 (ref 1–2.5)
ALP BLD-CCNC: 54 U/L (ref 35–104)
ALT SERPL-CCNC: 14 U/L (ref 5–33)
ANION GAP SERPL CALCULATED.3IONS-SCNC: 9 MMOL/L (ref 9–17)
AST SERPL-CCNC: 15 U/L
BASOPHILS # BLD: 1 % (ref 0–2)
BASOPHILS ABSOLUTE: 0.1 K/UL (ref 0–0.2)
BILIRUB SERPL-MCNC: 0.33 MG/DL (ref 0.3–1.2)
BUN BLDV-MCNC: 13 MG/DL (ref 6–20)
BUN/CREAT BLD: ABNORMAL (ref 9–20)
CA 19-9: 4965 U/ML (ref 0–35)
CALCIUM SERPL-MCNC: 9.3 MG/DL (ref 8.6–10.4)
CHLORIDE BLD-SCNC: 105 MMOL/L (ref 98–107)
CO2: 25 MMOL/L (ref 20–31)
CREAT SERPL-MCNC: 0.51 MG/DL (ref 0.5–0.9)
DIFFERENTIAL TYPE: ABNORMAL
EOSINOPHILS RELATIVE PERCENT: 5 % (ref 1–4)
GFR AFRICAN AMERICAN: >60 ML/MIN
GFR NON-AFRICAN AMERICAN: >60 ML/MIN
GFR SERPL CREATININE-BSD FRML MDRD: ABNORMAL ML/MIN/{1.73_M2}
GFR SERPL CREATININE-BSD FRML MDRD: ABNORMAL ML/MIN/{1.73_M2}
GLUCOSE BLD-MCNC: 97 MG/DL (ref 70–99)
HCT VFR BLD CALC: 32.7 % (ref 36–46)
HEMOGLOBIN: 11 G/DL (ref 12–16)
IMMATURE GRANULOCYTES: ABNORMAL %
LYMPHOCYTES # BLD: 26 % (ref 24–44)
MCH RBC QN AUTO: 30 PG (ref 26–34)
MCHC RBC AUTO-ENTMCNC: 33.7 G/DL (ref 31–37)
MCV RBC AUTO: 88.8 FL (ref 80–100)
MONOCYTES # BLD: 8 % (ref 2–11)
NRBC AUTOMATED: ABNORMAL PER 100 WBC
PDW BLD-RTO: 14.1 % (ref 12.5–15.4)
PLATELET # BLD: 238 K/UL (ref 140–450)
PLATELET ESTIMATE: ABNORMAL
PMV BLD AUTO: 8.7 FL (ref 6–12)
POTASSIUM SERPL-SCNC: 4.2 MMOL/L (ref 3.7–5.3)
RBC # BLD: 3.68 M/UL (ref 4–5.2)
RBC # BLD: ABNORMAL 10*6/UL
SEG NEUTROPHILS: 60 % (ref 36–66)
SEGMENTED NEUTROPHILS ABSOLUTE COUNT: 2.9 K/UL (ref 1.8–7.7)
SODIUM BLD-SCNC: 139 MMOL/L (ref 135–144)
TOTAL PROTEIN: 6.3 G/DL (ref 6.4–8.3)
WBC # BLD: 4.8 K/UL (ref 3.5–11)
WBC # BLD: ABNORMAL 10*3/UL

## 2019-06-13 PROCEDURE — 86301 IMMUNOASSAY TUMOR CA 19-9: CPT

## 2019-06-13 PROCEDURE — 6360000002 HC RX W HCPCS: Performed by: INTERNAL MEDICINE

## 2019-06-13 PROCEDURE — 99215 OFFICE O/P EST HI 40 MIN: CPT | Performed by: INTERNAL MEDICINE

## 2019-06-13 PROCEDURE — 99211 OFF/OP EST MAY X REQ PHY/QHP: CPT | Performed by: INTERNAL MEDICINE

## 2019-06-13 PROCEDURE — 85025 COMPLETE CBC W/AUTO DIFF WBC: CPT

## 2019-06-13 PROCEDURE — 2580000003 HC RX 258: Performed by: INTERNAL MEDICINE

## 2019-06-13 PROCEDURE — 36591 DRAW BLOOD OFF VENOUS DEVICE: CPT

## 2019-06-13 PROCEDURE — 80053 COMPREHEN METABOLIC PANEL: CPT

## 2019-06-13 RX ORDER — SODIUM CHLORIDE 0.9 % (FLUSH) 0.9 %
20 SYRINGE (ML) INJECTION PRN
Status: DISCONTINUED | OUTPATIENT
Start: 2019-06-13 | End: 2019-06-14 | Stop reason: HOSPADM

## 2019-06-13 RX ORDER — SODIUM CHLORIDE 0.9 % (FLUSH) 0.9 %
10 SYRINGE (ML) INJECTION PRN
Status: CANCELLED | OUTPATIENT
Start: 2019-06-13

## 2019-06-13 RX ORDER — HEPARIN SODIUM (PORCINE) LOCK FLUSH IV SOLN 100 UNIT/ML 100 UNIT/ML
500 SOLUTION INTRAVENOUS PRN
Status: CANCELLED | OUTPATIENT
Start: 2019-06-13

## 2019-06-13 RX ORDER — SODIUM CHLORIDE 0.9 % (FLUSH) 0.9 %
20 SYRINGE (ML) INJECTION PRN
Status: CANCELLED | OUTPATIENT
Start: 2019-06-13

## 2019-06-13 RX ORDER — HEPARIN SODIUM (PORCINE) LOCK FLUSH IV SOLN 100 UNIT/ML 100 UNIT/ML
500 SOLUTION INTRAVENOUS PRN
Status: DISCONTINUED | OUTPATIENT
Start: 2019-06-13 | End: 2019-06-14 | Stop reason: HOSPADM

## 2019-06-13 RX ADMIN — SODIUM CHLORIDE, PRESERVATIVE FREE 20 ML: 5 INJECTION INTRAVENOUS at 15:34

## 2019-06-13 RX ADMIN — HEPARIN SODIUM (PORCINE) LOCK FLUSH IV SOLN 100 UNIT/ML 500 UNITS: 100 SOLUTION at 15:34

## 2019-06-13 NOTE — TELEPHONE ENCOUNTER
Name: Tomasa Rebolledo  : 1960  MRN: [id number]    Oncology Navigation Follow-Up Note    Contact Type:  Medical Oncology  Notes: Pt @ [facility] for Dr. Andrews Chowdhury f/u. Accompanied Dr. Andrews Chowdhury in exam room. Dr. Andrews Chowdhury spoke with pt & pt's spouse @ length r/t recent surgical pathology, chemotherapy, SBRT, & referral to tertiary center for possible clinical trial.  New referrals for [address] placed. Spoke with pt & pt's spouse r/t [employer] & written brochure given. Instructed pt may contact writer prn. Will continue to follow.     Electronically signed by Chuy Asher RN on 2019 at 4:25 PM

## 2019-06-13 NOTE — PROGRESS NOTES
DIAGNOSIS:   1. Pancreatic invasive ductal adenocarcinoma, stage III  2. Liver lesions, metastatic disease 05/2019  3. 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 FOLFIRINOX - started 12/04/2018, received three cycles. With radiologic response  2. Palliative therapy / rehab for the LE weakness   3. Plan for chemotherapy  4. Considering SBRT to primary tumor  5. Considering clinical trial     BRIEF CASE HISTORY:  Precious Monroe is a very pleasant 66 y.o. female who is referred to us for pancreatic cancer. She reports abdominal pain started 09/2018, worse at night, she consulted with PCP and she started acid reducer. She was referred to GI and EGD and colonoscopy was done - both negative, CT was done showing mass in the body of the pancreas measuring 3.6 x 2.6 x 2.5 cm. She underwent endoscopic ultrasound for biopsy, pathology confirmed pancreatic invasive ductal adenocarcinoma. She has had some weight loss, she has had a lot of stress and is unsure if it is disease related. She has had insomnia due to discomfort. She has consult scheduled with surgeon. She underwent diagnostic laparoscopy. Mother had breast cancer. She has no history of chemical or radiation exposure. Patient was seen by [facility] for laparoscopy. Intraoperatively, a liver lesion was appreciated. Biopsy was negative for metastatic disease. The patient was found to have locally advanced disease and the recommendation was to proceed with neoadjuvant chemotherapy and possibly radiation depending on response. Treat with FOLFIRINOX was started 12/04/2018, she experienced significant nausea and opoid induced constipation. Following cycle #3 she underwent celiac block, she experienced significant neuropathy and work up indicated cord infarction. She was released to rehab facility and is undergoing PT. CT done 01/22/2019 showed decrease in pancreatic mass.    Decided to hold oxaliplatin chest pain, PND or orthopnea or palpitations. + LE swelling  Gastrointestinal: No vomiting, diarrhea, nausea. + abdominal pain - minimal  Genitourinary: Denies dysuria, hematuria, frequency, urgency or incontinence. Neurological: Denies headaches, decreased LOC, no sensory or motor focal deficits. Sensory neuropathy around L2 - improving. Musculoskeletal: No arthralgia no back pain or joint swelling. Skin: There are no rashes or bleeding.+grade 1 bedsore in sacral area - improving; wound on right heel is healed  Psychiatric:  No anxiety, no depression. Endocrine: No diabetes or thyroid disease. Hematologic: No bleeding, no adenopathy. PHYSICAL EXAM: Shows a well appearing 66y.o.-year-old female who is not in pain or distress. Vital Signs: Blood pressure 116/82, pulse 84, temperature 97.4 °F (36.3 °C), temperature source Oral, weight 108 lb 14.4 oz (49.4 kg), last menstrual period 09/07/2012, not currently breastfeeding. HEENT: Normocephalic and atraumatic. Pupils are equal, round, reactive to light and accommodation. Extraocular muscles are intact. Neck: Showed no JVD, no carotid bruit . Lungs: Clear to auscultation bilaterally. Heart: Regular without any murmur. Abdomen: Soft, nontender. No hepatosplenomegaly. Surgery for laparoscopy is healing well. Extremities: Lower extremities show +1 edema on the left and trace on the right, pressure sore on medial aspect of right heel - completely healed. No clubbing, or cyanosis. Breasts: Examination not done today.  Neuro exam: intact cranial nerves bilaterally, she continues to have weakness and numbness in both lower extremities but continued improvement compared to last exam Lymphatic: no adenopathy appreciated in the supraclavicular, axillary, cervical or inguinal area  Skin: grade 1 bedsore in sacral area    REVIEW OF LABORATORY DATA:   Lab Results   Component Value Date    WBC 4.8 06/13/2019    HGB 11.0 (L) 06/13/2019    HCT 32.7 (L) 06/13/2019    MCV 88.8 06/13/2019     06/13/2019     Lab Results   Component Value Date    NEUTROABS 2.90 06/13/2019         Chemistry        Component Value Date/Time     06/13/2019 1438    K 4.2 06/13/2019 1438     06/13/2019 1438    CO2 25 06/13/2019 1438    BUN 13 06/13/2019 1438    CREATININE 0.51 06/13/2019 1438        Component Value Date/Time    CALCIUM 9.3 06/13/2019 1438    ALKPHOS 54 06/13/2019 1438    AST 15 06/13/2019 1438    ALT 14 06/13/2019 1438    BILITOT 0.33 06/13/2019 1438              PATHOLOGY:     REVIEW OF RADIOLOGICAL RESULTS:     PATHOLOGY:       IMPRESSION:   1. Invasive ductal adenocarcinoma of the pancreas, stage III  2. [place] adjuvant FOLFIRINOX started 12/04/2018  3. Liver lesions found, removed during surgery 05/2019 and pathology confirmed metastatic disease  4. Plan for second opinion  5. Plan palliative chemo  6. Considering SBRT to primary tumor     PLAN:   1. We reviewed recent surgical pathology and I discussed prognostic data. 2. I discussed treatment options including standard therapy, clinical trial and goals of treatment. 3. I explained based on pathology and cancer marker her disease appears to be responsive. 4. After much discussion she elected to purse treatment with chemo and consider SBRT to primary tumor to help with disease control , as well as consultation with larger center regarding trials, we discussed sequencing. 5. We discussed advocacy groups. 6. I am putting in orders and referrals. 7. Return to commence with treatment.

## 2019-06-14 ENCOUNTER — TELEPHONE (OUTPATIENT)
Dept: ONCOLOGY | Age: 59
End: 2019-06-14

## 2019-06-14 NOTE — TELEPHONE ENCOUNTER
Name: Tomasa Rebolledo  : 1960  MRN: [id number]    Oncology Navigation Follow-Up Note    Contact Type:  Telephone  Notes: Pt called in stating received call from [facility], to schedule consultation & earliest available appt 19. Instructed pt writer will contact [employer]. Spoke with Brenna Bañuelos & [place], ([phone]) updated on pt & notified need consultation asap. Tramaine Hicks stated will send message to Dr. Leila Burger staff & request contact writer. Will continue to follow.     Electronically signed by Chuy Asher RN on 2019 at 12:36 PM

## 2019-06-14 NOTE — TELEPHONE ENCOUNTER
Name: Yvonne Banks  : 1960  MRN: [id number]    Oncology Navigation Follow-Up Note    Contact Type:  Telephone  Notes: Received call from Dali Moon, Dr. Jess Barber office, stating may reschedule pt to 19 @ 1400. Dali Moon stated Dr. Bertha Lomax in clinic Tues/Th, out of office week 19, & holiday 19. Requested 19 consultation. Spoke with Dr. Tameka Dunbar via telephone updated on [facility] referral.  Dr. Tameka Dunbar stated okay to d/c 19 f/u & will f/u with pt once Dr. Jess Barber recommendations received. Attempted to contact pt, no answer, VM left updated on Dr. Bertha Lomax 19 consultation, Dr. Tameka Dunbar 19 f/u canceled, & will f/u with pt after 19 Dr. Bertha Lomax consult completed. Will continue to follow.      Electronically signed by Arie Smith RN on 2019 at 1:11 PM

## 2019-06-14 NOTE — TELEPHONE ENCOUNTER
Per Dennise SHANKS-RN need to obtain a disc for pt with scans from 2018 to current (8) for pt to take with her to [address] [place] [address]. Spoke to [facility]/stated that they will send images via PAX due to no longer burning discs.

## 2019-06-18 ENCOUNTER — HOSPITAL ENCOUNTER (OUTPATIENT)
Dept: RADIATION ONCOLOGY | Age: 59
Discharge: HOME | End: 2019-06-18
Payer: COMMERCIAL

## 2019-06-18 ENCOUNTER — TELEPHONE (OUTPATIENT)
Dept: ONCOLOGY | Age: 59
End: 2019-06-18

## 2019-06-18 VITALS
TEMPERATURE: 98.8 F | HEART RATE: 86 BPM | RESPIRATION RATE: 18 BRPM | OXYGEN SATURATION: 99 % | DIASTOLIC BLOOD PRESSURE: 81 MMHG | SYSTOLIC BLOOD PRESSURE: 120 MMHG | BODY MASS INDEX: 18.37 KG/M2 | WEIGHT: 107 LBS

## 2019-06-18 PROCEDURE — 99213 OFFICE O/P EST LOW 20 MIN: CPT | Performed by: RADIOLOGY

## 2019-06-18 NOTE — CONSULTS
65-year-old female who first presented with GI discomfort dating back to the summer of 2018. She had occasional back pains. She denied any jaundice, change in the collar of the stool or urine or weight loss. She did proceed with an EGD and colonoscopy under the care of Dr. Freya Godfrey on 10/22/2019. A few polyps were removed for which were negative and examination was otherwise a negative. A CT of the abdomen and pelvis on 10/24/2018 dated the document a 3.6 cm lesion at the junction of the body and tail of the pancreas. There were a few hypodense lesions within the liver favoring cysts. The patient proceeded with an EUS on 11/7/2018 and this documented a 3.4 x 2.8 cm mass in the body and tail off the pancreas encasing the SMA of the and close to the celiac trunk. A biopsy did return positive for an adenocarcinoma. The patient was referred to hepatobiliary surgeon Dr. Basil Harrell and did have an exploratory laparotomy on 11/28/2018. Her noted documented a borderline resectable pancreas and a liver nodule was biopsied which returned negative for malignancy. The patient then saw medical oncologist Dr. Pritesh Riggs and proceeded with neoadjuvant chemotherapy. A follow-up CT of the abdomen and pelvis in January 2018 did did document decrease in size of the pancreatic lesion. No up CT on 4/19/2019 and documented the pancreas at 3 x 4 cm together with a 15 mm hepatic nodule consistent with a cyst and smaller hepatic nodules. The patient was taken for a Whipple procedure on 5/22/2019. Dr. Erin Reynoso did note liver nodules and a wedge resection of a right and left hepatic lobe nodule returned positive for an addendum carcinoma. The patient is seen today without additional complaints. She continues to experience back pains and did have a nerve block on 1/10/2019 which was unsuccessful. 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   She is presently recovering from lower extremity weakness as is on physical therapy. She has been taking OxyContin 5 mg 3-4 times daily and still rates her pain as a 3 on 10. Without the analgesics she rates it as a 9-10 on 10. Past Medical History:   Diagnosis Date    Asthma     Cancer ([address]     pancreatic ca  dx 11/15/18    Congenital heart disease     Gastritis without bleeding 10/18/2018    History of abnormal cervical Pap smear 2000    Pre-Cancerous Cells    Hyperlipidemia     ratio ok    Pressure ulcer of coccygeal region, stage 2 2/28/2019    Seasonal allergies        Past Surgical History:   Procedure Laterality Date    BREAST BIOPSY  2009    BREAST REDUCTION SURGERY  1982    COLONOSCOPY  2002    ENDOSCOPY, COLON, DIAGNOSTIC      LAPAROSCOPY      [facility] N/A 11/7/2018    ENDOSCOPIC ULTRASOUND WITH FNA PANCREAS performed by Loan Bonilla MD at [address] W/ SUBCUTANEOUS PORT           Current Outpatient Medications:     IBUPROFEN PO, Take 800 mg by mouth 3 times daily as needed, Disp: , Rfl:     oxyCODONE HCl (OXY-IR) 10 MG immediate release tablet, Take 10 mg by mouth every 4 hours as needed for Pain (taking 5 mg). , Disp: , Rfl:     medical marijuana, Take 1 each by mouth as needed (pain control). Through [facility] dispensery [facility] Disp: , Rfl:     OLANZapine (ZYPREXA) 5 MG tablet, Take 5 mg by mouth as needed , Disp: , Rfl:     prochlorperazine (COMPAZINE) 10 MG tablet, Take 1 tablet by mouth every 6 hours as needed for Nausea, Disp: 120 tablet, Rfl: 2    promethazine (PHENERGAN) 12.5 MG tablet, Take 1 tablet by mouth every 6 hours as needed for Nausea, Disp: 120 tablet, Rfl: 2    aspirin 81 MG chewable tablet, Take 81 mg by mouth daily, Disp: , Rfl:     sennosides-docusate sodium (SENOKOT-S) 8.6-50 MG tablet, Take 1 tablet by mouth daily as needed for Constipation, Disp: , Rfl:     lidocaine-prilocaine (EMLA) 2.5-2.5 % cream, Apply topically as needed.   Apply a quarter size amount to port site 1 hour before chemotherapy appointment.   Cover with plastic wrap., Disp: 30 g, Rfl: 1    ondansetron (ZOFRAN-ODT) 8 MG TBDP disintegrating tablet, Take 1 tablet by mouth every 8 hours as needed for Nausea or Vomiting, Disp: 90 tablet, Rfl: 3    ALLERGIES: No Known Allergies    Social History     Socioeconomic History    Marital status:      Spouse name: Rohit Nair Number of children: 25    Years of education: Not on file    Highest education level: Not on file   Occupational History    Not on file   Social Needs    Financial resource strain: Not on file    Food insecurity:     Worry: Not on file     Inability: Not on file    Transportation needs:     Medical: Not on file     Non-medical: Not on file   Tobacco Use    Smoking status: Never Smoker    Smokeless tobacco: Never Used   Substance and Sexual Activity    Alcohol use: No    Drug use: No    Sexual activity: Yes     Partners: Male   Lifestyle    Physical activity:     Days per week: Not on file     Minutes per session: Not on file    Stress: Not on file   Relationships    Social connections:     Talks on phone: Not on file     Gets together: Not on file     Attends [employer] service: Not on file     Active member of club or organization: Not on file     Attends meetings of clubs or organizations: Not on file     Relationship status: Not on file    Intimate partner violence:     Fear of current or ex partner: Not on file     Emotionally abused: Not on file     Physically abused: Not on file     Forced sexual activity: Not on file   Other Topics Concern    Not on file   Social History Narrative    Not on file       Family History   Problem Relation Age of Onset    Breast Cancer Mother 64    Heart Disease Father     Coronary Art Dis Father     Ovarian Cancer Maternal Grandmother 79    Pancreatic Cancer Paternal Uncle 80    Cancer Paternal Uncle        Labs:  WBC   Date Value Ref Range Status 2019 4.8 3.5 - 11.0 k/uL Final     Segs Absolute   Date Value Ref Range Status   2019 2.90 1.8 - 7.7 k/uL Final     Hemoglobin   Date Value Ref Range Status   2019 11.0 (L) 12.0 - 16.0 g/dL Final     Platelets   Date Value Ref Range Status   2019 238 140 - 450 k/uL Final     CA 19-9   Date Value Ref Range Status   2019 4965 (H) 0 - 35 U/mL Final     Comment:     The Roche \"ECLIA\" assay is used. Results obtained with different assay methods cannot be   used interchangeably. 2019 1532 (H) 0 - 35 U/mL Final     Comment:     The Roche \"ECLIA\" assay is used. Results obtained with different assay methods cannot be   used interchangeably. 2019 1821 (H) 0 - 35 U/mL Final     Comment:     The Roche \"ECLIA\" assay is used. Results obtained with different assay methods cannot be   used interchangeably. 2019 2620 (H) 0 - 35 U/mL Final     Comment:     The Roche \"ECLIA\" assay is used. Results obtained with different assay methods   cannot be used interchangeably. 2018 3686 (H) 0 - 35 U/mL Final     Comment:     The Roche \"ECLIA\" assay is used. Results obtained with different assay methods   cannot be used interchangeably. [email or web address] results found for: PSALASTLAB(GLUCOSE,BUN,CREATININE,BUNCRER,CALCIUM,NA,K,CL,CO2,ANIONGAP,ALKPHOS,ALT,AST,BILITOT,PROT,LABALBU,ALBUMIN,LABGLOM,GFRAA,GFR)  IPSS Questionnaire (AUA-7): Over the past month      Review of Systems    PHYSICAL EXAMINATION:  CHAPERONE: Not Required  ECO Symptomatic but completely ambulatory  Vital Signs: /81   Pulse 86   Temp 98.8 °F (37.1 °C) (Oral)   Resp 18   Wt 107 lb (48.5 kg)   LMP 2012   SpO2 99%   BMI 18.37 kg/m²   Physical Exam   the patient is in no acute distress, oriented in time, person and place. Mucous membrane is pink, moist and nonicteric. Pupils equal and reactive to light. Neck is supple without adenopathy.   Chest is clear to auscultation radiation therapy. I will however await the recommendations from [address] [place] [address]. I have therefore scheduled the patient to return on 7/9/2019. lectronically signed by Elizabeth Campos MD on 6/18/2019 at 6:45 PM  [id number]:  Patient Care Team:  Jackie Allen as PCP - General  Amita Arredondo MD as Consulting Physician (Gastroenterology)  Jacquelyn Crum, RN as Nurse Navigator (Oncology)     Drugs Prescribed:  New Prescriptions    No medications on file       Orders Placed:   No orders of the defined types were placed in this encounter.

## 2019-06-18 NOTE — PROGRESS NOTES
Referring Physician: Dr Alex Rocha:    06/18/19 1303   BP: 120/81   Pulse: 86   Resp: 18   Temp: 98.8 °F (37.1 °C)   SpO2: 99%    : Wt Readings from Last 1 Encounters:   06/18/19 107 lb (48.5 kg)        Body mass index is 18.37 kg/m². Immunizations:    Influenza status:    []   Current   [x]   Patient declined    Pneumococcal status:  [x]   Current  []   Patient declined    Smoking Status:    [] Smoker - PPD:   [] Nonsmoker - Quit Date:               [x] Never a smoker                     No chief complaint on file. Cancer Staging  No matching staging information was found for the patient. Prior Radiation Therapy? No   If yes, site treated:   Facility:                             Date:    Concurrent Chemo/radiation? No   If yes, start date:    Prior Chemotherapy? Yes   If yes    Facility: [facility]                             Date: fall 2018- present     Prior Hormonal Therapy? No   If yes   Facility:                             Date:    Head and Neck Cancer? No   If yes, please remind physician to place swallow study order and speech therapy order. Pacemaker/Defibulator/ICD:  No              Current Outpatient Medications   Medication Sig Dispense Refill    IBUPROFEN PO Take 800 mg by mouth 3 times daily as needed      oxyCODONE HCl (OXY-IR) 10 MG immediate release tablet Take 10 mg by mouth every 4 hours as needed for Pain (taking 5 mg).  medical marijuana Take 1 each by mouth as needed (pain control). Through [facility] dispensery in [facility].       OLANZapine (ZYPREXA) 5 MG tablet Take 5 mg by mouth as needed       prochlorperazine (COMPAZINE) 10 MG tablet Take 1 tablet by mouth every 6 hours as needed for Nausea 120 tablet 2    promethazine (PHENERGAN) 12.5 MG tablet Take 1 tablet by mouth every 6 hours as needed for Nausea 120 tablet 2    aspirin 81 MG chewable tablet Take 81 mg by mouth daily      sennosides-docusate sodium (SENOKOT-S) 8.6-50 MG tablet activities  2. Reorient confused/cognitively impaired patient  3. Call-light/bell within patient's reach  4. Chair/bed in low position, stretcher/bed with siderails up except when performing patient care activities  5. Educate patient/family/caregiver on falls prevention  6.  Reassess in 12 weeks or with any noted change in patient condition which places them at a risk for a fall   4-6 Moderate Risk 1. Provide assistance as indicated for ambulation activities  2. Reorient confused/cognitively impaired patient  3. Call-light/bell within patient's reach  4. Chair/bed in low position, stretcher/bed with siderails up except when performing patient care activities  5. Educate patient/family/caregiver on falls prevention     7 or   Higher High Risk 1. Place patient in easily observable treatment room  2. Patient attended at all times by family member or staff  3. Provide assistance as indicated for ambulation activities  4. Reorient confused/cognitively impaired patient  5. Call-light/bell within patient's reach  6. Chair/bed in low position, stretcher/bed with siderails up except when performing patient care activities  7. Educate patient/family/caregiver on falls prevention             PLAN: Patient was seen for consultation. She reports pain in her left flank area. Her current pain medication are helping control pain per pt. Dr Mirza Hannon notified and examined pt. Pt to have a second opinion at [facility] next week. She will return to [facility] for a dual RO/MO f/u after.          Finley Leyden 6/18/2019 1:06 PM

## 2019-06-18 NOTE — TELEPHONE ENCOUNTER
Name: Hiwot Ernst  : 1960  MRN: [id number]    Oncology Navigation Follow-Up Note    Contact Type:  Radiation Oncology  Notes: Pt @ [facility] for RO consult. Met with pt prior to consult. Pt stated spoke with friend who used to work @ [employer]. Pt stated friend contacted Dr. Petrona Murrell, [email or web address] pancreatic surgeon. Pt stated scheduled for Dr. Kelsi Lorenzo consultation 19. Pt denied questions/concerns/issues. Instructed pt may contact writer prn. Will continue to follow.      Electronically signed by Liberty Curry RN on 2019 at 4:16 PM

## 2019-06-18 NOTE — TELEPHONE ENCOUNTER
Writer spoke with radiology dept at [facility]. They stated that all of patient's images at [facility] were placed onto 3 total CD's for patient to take to appt at [facility]. Writer called patient and left a voicemail informing her of above. CD's placed in sealed envelope in triage office.  Bill Prom

## 2019-06-26 ENCOUNTER — TELEPHONE (OUTPATIENT)
Dept: ONCOLOGY | Age: 59
End: 2019-06-26

## 2019-06-26 NOTE — TELEPHONE ENCOUNTER
Per Torie Escobar, RN nurse navigator, pt to be scheduled stat for CT Abdomen, ordered by Gifty Westbrook CNP at [facility]. Pt scheduled for 6/27/19 at 1315, arrive at 1215. Luis Eduardo Monk RN to notify pt of this.   Order faxed to [facility] admitting at [phone]-[id number] rec'd

## 2019-06-27 ENCOUNTER — HOSPITAL ENCOUNTER (OUTPATIENT)
Dept: CT IMAGING | Age: 59
Discharge: HOME | End: 2019-06-29
Payer: COMMERCIAL

## 2019-06-27 DIAGNOSIS — C25.9 PANCREATIC ADENOCARCINOMA (HCC): ICD-10-CM

## 2019-06-27 PROCEDURE — 6360000004 HC RX CONTRAST MEDICATION

## 2019-06-27 PROCEDURE — 74160 CT ABDOMEN W/CONTRAST: CPT

## 2019-06-27 PROCEDURE — 2580000003 HC RX 258

## 2019-06-27 RX ORDER — SODIUM CHLORIDE 0.9 % (FLUSH) 0.9 %
10 SYRINGE (ML) INJECTION PRN
Status: DISCONTINUED | OUTPATIENT
Start: 2019-06-27 | End: 2019-06-30 | Stop reason: HOSPADM

## 2019-06-27 RX ORDER — 0.9 % SODIUM CHLORIDE 0.9 %
80 INTRAVENOUS SOLUTION INTRAVENOUS ONCE
Status: COMPLETED | OUTPATIENT
Start: 2019-06-27 | End: 2019-06-27

## 2019-06-27 RX ADMIN — IOVERSOL 100 ML: 741 INJECTION INTRA-ARTERIAL; INTRAVENOUS at 12:47

## 2019-06-27 RX ADMIN — Medication 10 ML: at 12:47

## 2019-06-27 RX ADMIN — SODIUM CHLORIDE 80 ML: 9 INJECTION, SOLUTION INTRAVENOUS at 12:47

## 2019-06-28 NOTE — TELEPHONE ENCOUNTER
Spoke w/Kely at [facility] radiology dept, she stated that the pt's CT was electronically sent to [facility], possibly twice as they cannot go back and track, she resent on 6/28/19

## 2019-07-02 ENCOUNTER — TELEPHONE (OUTPATIENT)
Dept: ONCOLOGY | Age: 59
End: 2019-07-02

## 2019-07-02 NOTE — TELEPHONE ENCOUNTER
Name: Zarina Guadarrama  : 1960  MRN: [id number]    Oncology Navigation Follow-Up Note    Contact Type:  Telephone  Notes: Pt called in stating completed Dr. Milagros Mueller consult @ [facility] yesterday. Pt stated recommendations from Dr. Jerry Pat are to resume chemotherapy. Instructed pt writer will speak with Dr. Connor Meza tomorrow upon his return & call back with outcome. Will continue to follow.     Electronically signed by Edouard Lou RN on 2019 at 10:42 AM

## 2019-07-03 ENCOUNTER — TELEPHONE (OUTPATIENT)
Dept: ONCOLOGY | Age: 59
End: 2019-07-03

## 2019-07-03 RX ORDER — PALONOSETRON 0.05 MG/ML
0.25 INJECTION, SOLUTION INTRAVENOUS ONCE
Status: CANCELLED | OUTPATIENT
Start: 2019-07-15

## 2019-07-03 RX ORDER — ATROPINE SULFATE 0.4 MG/ML
0.4 AMPUL (ML) INJECTION
Status: CANCELLED | OUTPATIENT
Start: 2019-07-15

## 2019-07-03 RX ORDER — SODIUM CHLORIDE 9 MG/ML
INJECTION, SOLUTION INTRAVENOUS CONTINUOUS
Status: CANCELLED | OUTPATIENT
Start: 2019-07-15

## 2019-07-03 RX ORDER — FLUOROURACIL 50 MG/ML
400 INJECTION, SOLUTION INTRAVENOUS ONCE
Status: CANCELLED | OUTPATIENT
Start: 2019-07-15

## 2019-07-03 RX ORDER — HEPARIN SODIUM (PORCINE) LOCK FLUSH IV SOLN 100 UNIT/ML 100 UNIT/ML
500 SOLUTION INTRAVENOUS PRN
Status: CANCELLED | OUTPATIENT
Start: 2019-07-15

## 2019-07-03 RX ORDER — 0.9 % SODIUM CHLORIDE 0.9 %
10 VIAL (ML) INJECTION ONCE
Status: CANCELLED | OUTPATIENT
Start: 2019-07-15

## 2019-07-03 RX ORDER — DIPHENHYDRAMINE HYDROCHLORIDE 50 MG/ML
50 INJECTION INTRAMUSCULAR; INTRAVENOUS ONCE
Status: CANCELLED | OUTPATIENT
Start: 2019-07-15

## 2019-07-03 RX ORDER — DEXTROSE MONOHYDRATE 50 MG/ML
INJECTION, SOLUTION INTRAVENOUS ONCE
Status: CANCELLED | OUTPATIENT
Start: 2019-07-15

## 2019-07-03 RX ORDER — METHYLPREDNISOLONE SODIUM SUCCINATE 125 MG/2ML
125 INJECTION, POWDER, LYOPHILIZED, FOR SOLUTION INTRAMUSCULAR; INTRAVENOUS ONCE
Status: CANCELLED | OUTPATIENT
Start: 2019-07-15

## 2019-07-03 RX ORDER — SODIUM CHLORIDE 0.9 % (FLUSH) 0.9 %
5 SYRINGE (ML) INJECTION PRN
Status: CANCELLED | OUTPATIENT
Start: 2019-07-15

## 2019-07-03 RX ORDER — SODIUM CHLORIDE 0.9 % (FLUSH) 0.9 %
10 SYRINGE (ML) INJECTION PRN
Status: CANCELLED | OUTPATIENT
Start: 2019-07-15

## 2019-07-08 ENCOUNTER — TELEPHONE (OUTPATIENT)
Dept: ONCOLOGY | Age: 59
End: 2019-07-08

## 2019-07-11 ENCOUNTER — TELEPHONE (OUTPATIENT)
Dept: ONCOLOGY | Age: 59
End: 2019-07-11

## 2019-07-11 NOTE — TELEPHONE ENCOUNTER
Name: Srinivasan Stafford  : 1960  MRN: [id number]    Oncology Navigation Follow-Up Note    Contact Type:  Telephone  Notes: Spoke with 6 Kristofer Degroot, Amanda [address] pre cert, ([phone]) updated on pt & inquired on PA. 6 Kristofer Degroot stated will notify pt once approved. Will continue to follow.     Electronically signed by Johnna Moore RN on 2019 at 2:05 PM

## 2019-07-12 ENCOUNTER — TELEPHONE (OUTPATIENT)
Dept: ONCOLOGY | Age: 59
End: 2019-07-12

## 2019-07-12 NOTE — TELEPHONE ENCOUNTER
Name: Yan Ansari  : 1960  MRN: [id number]    Oncology Navigation Follow-Up Note    Contact Type:  Telephone  Notes: Received call from CHRISTINA Monroe & [facility] pre cert, stating pt's chemotherapy PA approved. Spoke with [facility] oncology manager, updated on pt & requested pt scheduled 19. Fidelia Brunner stated will speak with [facility] . Spoke with pt via telephone, updated chemotherapy PA approved, Heidi Albright, & [facility]  to call with 19 appt details. Pt verbalized understanding & thanked writer. Will continue to follow.     Electronically signed by Renae Spann RN on 2019 at 10:00 AM

## 2019-07-15 ENCOUNTER — HOSPITAL ENCOUNTER (OUTPATIENT)
Dept: INFUSION THERAPY | Age: 59
Discharge: HOME | End: 2019-07-15
Payer: COMMERCIAL

## 2019-07-15 VITALS
WEIGHT: 104 LBS | BODY MASS INDEX: 17.75 KG/M2 | DIASTOLIC BLOOD PRESSURE: 88 MMHG | HEIGHT: 64 IN | SYSTOLIC BLOOD PRESSURE: 132 MMHG | RESPIRATION RATE: 16 BRPM | TEMPERATURE: 97.7 F | HEART RATE: 83 BPM

## 2019-07-15 DIAGNOSIS — R29.898 WEAKNESS OF BOTH LOWER EXTREMITIES: ICD-10-CM

## 2019-07-15 DIAGNOSIS — G95.11 SPINAL CORD INFARCTION (HCC): ICD-10-CM

## 2019-07-15 DIAGNOSIS — C25.1 MALIGNANT NEOPLASM OF BODY OF PANCREAS (HCC): Primary | ICD-10-CM

## 2019-07-15 LAB
ABSOLUTE EOS #: 0.1 K/UL (ref 0–0.4)
ABSOLUTE IMMATURE GRANULOCYTE: ABNORMAL K/UL (ref 0–0.3)
ABSOLUTE LYMPH #: 0.8 K/UL (ref 1–4.8)
ABSOLUTE MONO #: 0.3 K/UL (ref 0.1–1.2)
ALBUMIN SERPL-MCNC: 3.9 G/DL (ref 3.5–5.2)
ALBUMIN/GLOBULIN RATIO: 1.4 (ref 1–2.5)
ALP BLD-CCNC: 56 U/L (ref 35–104)
ALT SERPL-CCNC: 19 U/L (ref 5–33)
ANION GAP SERPL CALCULATED.3IONS-SCNC: 10 MMOL/L (ref 9–17)
AST SERPL-CCNC: 19 U/L
BASOPHILS # BLD: 1 % (ref 0–2)
BASOPHILS ABSOLUTE: 0 K/UL (ref 0–0.2)
BILIRUB SERPL-MCNC: 0.4 MG/DL (ref 0.3–1.2)
BUN BLDV-MCNC: 14 MG/DL (ref 6–20)
BUN/CREAT BLD: ABNORMAL (ref 9–20)
CALCIUM SERPL-MCNC: 9.8 MG/DL (ref 8.6–10.4)
CHLORIDE BLD-SCNC: 104 MMOL/L (ref 98–107)
CO2: 27 MMOL/L (ref 20–31)
CREAT SERPL-MCNC: 0.55 MG/DL (ref 0.5–0.9)
DIFFERENTIAL TYPE: ABNORMAL
EOSINOPHILS RELATIVE PERCENT: 3 % (ref 1–4)
GFR AFRICAN AMERICAN: >60 ML/MIN
GFR NON-AFRICAN AMERICAN: >60 ML/MIN
GFR SERPL CREATININE-BSD FRML MDRD: ABNORMAL ML/MIN/{1.73_M2}
GFR SERPL CREATININE-BSD FRML MDRD: ABNORMAL ML/MIN/{1.73_M2}
GLUCOSE BLD-MCNC: 106 MG/DL (ref 70–99)
HCT VFR BLD CALC: 34.4 % (ref 36–46)
HEMOGLOBIN: 11.5 G/DL (ref 12–16)
IMMATURE GRANULOCYTES: ABNORMAL %
LYMPHOCYTES # BLD: 21 % (ref 24–44)
MCH RBC QN AUTO: 29.1 PG (ref 26–34)
MCHC RBC AUTO-ENTMCNC: 33.5 G/DL (ref 31–37)
MCV RBC AUTO: 86.9 FL (ref 80–100)
MONOCYTES # BLD: 8 % (ref 2–11)
NRBC AUTOMATED: ABNORMAL PER 100 WBC
PDW BLD-RTO: 14 % (ref 12.5–15.4)
PLATELET # BLD: 197 K/UL (ref 140–450)
PLATELET ESTIMATE: ABNORMAL
PMV BLD AUTO: 8.8 FL (ref 6–12)
POTASSIUM SERPL-SCNC: 4.2 MMOL/L (ref 3.7–5.3)
RBC # BLD: 3.95 M/UL (ref 4–5.2)
RBC # BLD: ABNORMAL 10*6/UL
SEG NEUTROPHILS: 67 % (ref 36–66)
SEGMENTED NEUTROPHILS ABSOLUTE COUNT: 2.5 K/UL (ref 1.8–7.7)
SODIUM BLD-SCNC: 141 MMOL/L (ref 135–144)
TOTAL PROTEIN: 6.6 G/DL (ref 6.4–8.3)
WBC # BLD: 3.8 K/UL (ref 3.5–11)
WBC # BLD: ABNORMAL 10*3/UL

## 2019-07-15 PROCEDURE — 96416 CHEMO PROLONG INFUSE W/PUMP: CPT

## 2019-07-15 PROCEDURE — 96368 THER/DIAG CONCURRENT INF: CPT

## 2019-07-15 PROCEDURE — 2580000003 HC RX 258: Performed by: INTERNAL MEDICINE

## 2019-07-15 PROCEDURE — 96415 CHEMO IV INFUSION ADDL HR: CPT

## 2019-07-15 PROCEDURE — 96413 CHEMO IV INFUSION 1 HR: CPT

## 2019-07-15 PROCEDURE — 96411 CHEMO IV PUSH ADDL DRUG: CPT

## 2019-07-15 PROCEDURE — 80053 COMPREHEN METABOLIC PANEL: CPT

## 2019-07-15 PROCEDURE — 6360000002 HC RX W HCPCS: Performed by: INTERNAL MEDICINE

## 2019-07-15 PROCEDURE — 36591 DRAW BLOOD OFF VENOUS DEVICE: CPT

## 2019-07-15 PROCEDURE — 85025 COMPLETE CBC W/AUTO DIFF WBC: CPT

## 2019-07-15 PROCEDURE — 96375 TX/PRO/DX INJ NEW DRUG ADDON: CPT

## 2019-07-15 RX ORDER — SODIUM CHLORIDE 0.9 % (FLUSH) 0.9 %
10 SYRINGE (ML) INJECTION PRN
Status: DISCONTINUED | OUTPATIENT
Start: 2019-07-15 | End: 2019-07-16 | Stop reason: HOSPADM

## 2019-07-15 RX ORDER — FLUOROURACIL 50 MG/ML
600 INJECTION, SOLUTION INTRAVENOUS ONCE
Status: COMPLETED | OUTPATIENT
Start: 2019-07-15 | End: 2019-07-15

## 2019-07-15 RX ORDER — PALONOSETRON 0.05 MG/ML
0.25 INJECTION, SOLUTION INTRAVENOUS ONCE
Status: COMPLETED | OUTPATIENT
Start: 2019-07-15 | End: 2019-07-15

## 2019-07-15 RX ORDER — DEXAMETHASONE SODIUM PHOSPHATE 10 MG/ML
10 INJECTION INTRAMUSCULAR; INTRAVENOUS ONCE
Status: COMPLETED | OUTPATIENT
Start: 2019-07-15 | End: 2019-07-15

## 2019-07-15 RX ORDER — DEXTROSE MONOHYDRATE 50 MG/ML
INJECTION, SOLUTION INTRAVENOUS ONCE
Status: COMPLETED | OUTPATIENT
Start: 2019-07-15 | End: 2019-07-15

## 2019-07-15 RX ORDER — HEPARIN SODIUM (PORCINE) LOCK FLUSH IV SOLN 100 UNIT/ML 100 UNIT/ML
500 SOLUTION INTRAVENOUS PRN
Status: DISCONTINUED | OUTPATIENT
Start: 2019-07-15 | End: 2019-07-16 | Stop reason: HOSPADM

## 2019-07-15 RX ORDER — ATROPINE SULFATE 0.4 MG/ML
0.4 AMPUL (ML) INJECTION
Status: COMPLETED | OUTPATIENT
Start: 2019-07-15 | End: 2019-07-15

## 2019-07-15 RX ADMIN — DEXTROSE MONOHYDRATE: 50 INJECTION, SOLUTION INTRAVENOUS at 11:28

## 2019-07-15 RX ADMIN — PALONOSETRON HYDROCHLORIDE 0.25 MG: 0.25 INJECTION, SOLUTION INTRAVENOUS at 11:28

## 2019-07-15 RX ADMIN — FLUOROURACIL 600 MG: 50 INJECTION, SOLUTION INTRAVENOUS at 14:10

## 2019-07-15 RX ADMIN — Medication 10 ML: at 11:02

## 2019-07-15 RX ADMIN — LEUCOVORIN CALCIUM 600 MG: 100 INJECTION, POWDER, LYOPHILIZED, FOR SOLUTION INTRAMUSCULAR; INTRAVENOUS at 12:29

## 2019-07-15 RX ADMIN — FLUOROURACIL 3550 MG: 50 INJECTION, SOLUTION INTRAVENOUS at 14:14

## 2019-07-15 RX ADMIN — DEXAMETHASONE SODIUM PHOSPHATE 10 MG: 10 INJECTION INTRAMUSCULAR; INTRAVENOUS at 11:30

## 2019-07-15 RX ADMIN — Medication 10 ML: at 11:01

## 2019-07-15 RX ADMIN — Medication 10 ML: at 11:00

## 2019-07-15 RX ADMIN — ATROPINE SULFATE 0.4 MG: 0.4 INJECTION, SOLUTION INTRAMUSCULAR; INTRAVENOUS; SUBCUTANEOUS at 12:27

## 2019-07-15 RX ADMIN — DEXTROSE MONOHYDRATE 260 MG: 50 INJECTION, SOLUTION INTRAVENOUS at 12:29

## 2019-07-15 RX ADMIN — Medication 10 ML: at 14:10

## 2019-07-15 ASSESSMENT — PAIN DESCRIPTION - PAIN TYPE: TYPE: CHRONIC PAIN

## 2019-07-15 ASSESSMENT — PAIN SCALES - GENERAL: PAINLEVEL_OUTOF10: 3

## 2019-07-15 ASSESSMENT — PAIN DESCRIPTION - LOCATION: LOCATION: ABDOMEN

## 2019-07-15 NOTE — PROGRESS NOTES
Pt here for C1D1 Folfiri. Denies any new complaints. Labs drawn from port and results reviewed. Pt was treated without incident and d/c'd in stable condition. Pt will return on 7-17-19 for pump dc.

## 2019-07-17 ENCOUNTER — HOSPITAL ENCOUNTER (OUTPATIENT)
Dept: INFUSION THERAPY | Age: 59
Discharge: HOME | End: 2019-07-17
Payer: COMMERCIAL

## 2019-07-17 VITALS
HEART RATE: 88 BPM | SYSTOLIC BLOOD PRESSURE: 138 MMHG | RESPIRATION RATE: 16 BRPM | TEMPERATURE: 98.5 F | DIASTOLIC BLOOD PRESSURE: 91 MMHG

## 2019-07-17 DIAGNOSIS — G95.11 SPINAL CORD INFARCTION (HCC): ICD-10-CM

## 2019-07-17 DIAGNOSIS — C25.1 MALIGNANT NEOPLASM OF BODY OF PANCREAS (HCC): Primary | ICD-10-CM

## 2019-07-17 DIAGNOSIS — R29.898 WEAKNESS OF BOTH LOWER EXTREMITIES: ICD-10-CM

## 2019-07-17 LAB
ABSOLUTE EOS #: 0 K/UL (ref 0–0.4)
ABSOLUTE IMMATURE GRANULOCYTE: ABNORMAL K/UL (ref 0–0.3)
ABSOLUTE LYMPH #: 0.8 K/UL (ref 1–4.8)
ABSOLUTE MONO #: 0.2 K/UL (ref 0.1–1.2)
ALBUMIN SERPL-MCNC: 4.2 G/DL (ref 3.5–5.2)
ALBUMIN/GLOBULIN RATIO: 1.4 (ref 1–2.5)
ALP BLD-CCNC: 58 U/L (ref 35–104)
ALT SERPL-CCNC: 17 U/L (ref 5–33)
ANION GAP SERPL CALCULATED.3IONS-SCNC: 15 MMOL/L (ref 9–17)
AST SERPL-CCNC: 18 U/L
BASOPHILS # BLD: 1 % (ref 0–2)
BASOPHILS ABSOLUTE: 0 K/UL (ref 0–0.2)
BILIRUB SERPL-MCNC: 0.89 MG/DL (ref 0.3–1.2)
BUN BLDV-MCNC: 17 MG/DL (ref 6–20)
BUN/CREAT BLD: ABNORMAL (ref 9–20)
CALCIUM SERPL-MCNC: 10.4 MG/DL (ref 8.6–10.4)
CHLORIDE BLD-SCNC: 98 MMOL/L (ref 98–107)
CO2: 27 MMOL/L (ref 20–31)
CREAT SERPL-MCNC: 0.55 MG/DL (ref 0.5–0.9)
DIFFERENTIAL TYPE: ABNORMAL
EOSINOPHILS RELATIVE PERCENT: 0 % (ref 1–4)
GFR AFRICAN AMERICAN: >60 ML/MIN
GFR NON-AFRICAN AMERICAN: >60 ML/MIN
GFR SERPL CREATININE-BSD FRML MDRD: ABNORMAL ML/MIN/{1.73_M2}
GFR SERPL CREATININE-BSD FRML MDRD: ABNORMAL ML/MIN/{1.73_M2}
GLUCOSE BLD-MCNC: 142 MG/DL (ref 70–99)
HCT VFR BLD CALC: 37.6 % (ref 36–46)
HEMOGLOBIN: 12.6 G/DL (ref 12–16)
IMMATURE GRANULOCYTES: ABNORMAL %
LYMPHOCYTES # BLD: 15 % (ref 24–44)
MAGNESIUM: 2.3 MG/DL (ref 1.6–2.6)
MCH RBC QN AUTO: 28.9 PG (ref 26–34)
MCHC RBC AUTO-ENTMCNC: 33.6 G/DL (ref 31–37)
MCV RBC AUTO: 85.9 FL (ref 80–100)
MONOCYTES # BLD: 4 % (ref 2–11)
NRBC AUTOMATED: ABNORMAL PER 100 WBC
PDW BLD-RTO: 14.1 % (ref 12.5–15.4)
PLATELET # BLD: 222 K/UL (ref 140–450)
PLATELET ESTIMATE: ABNORMAL
PMV BLD AUTO: 8.4 FL (ref 6–12)
POTASSIUM SERPL-SCNC: 3.8 MMOL/L (ref 3.7–5.3)
RBC # BLD: 4.38 M/UL (ref 4–5.2)
RBC # BLD: ABNORMAL 10*6/UL
SEG NEUTROPHILS: 80 % (ref 36–66)
SEGMENTED NEUTROPHILS ABSOLUTE COUNT: 4.2 K/UL (ref 1.8–7.7)
SODIUM BLD-SCNC: 140 MMOL/L (ref 135–144)
TOTAL PROTEIN: 7.3 G/DL (ref 6.4–8.3)
WBC # BLD: 5.2 K/UL (ref 3.5–11)
WBC # BLD: ABNORMAL 10*3/UL

## 2019-07-17 PROCEDURE — 96360 HYDRATION IV INFUSION INIT: CPT

## 2019-07-17 PROCEDURE — 96375 TX/PRO/DX INJ NEW DRUG ADDON: CPT

## 2019-07-17 PROCEDURE — 80053 COMPREHEN METABOLIC PANEL: CPT

## 2019-07-17 PROCEDURE — 96361 HYDRATE IV INFUSION ADD-ON: CPT

## 2019-07-17 PROCEDURE — 2580000003 HC RX 258: Performed by: INTERNAL MEDICINE

## 2019-07-17 PROCEDURE — 36591 DRAW BLOOD OFF VENOUS DEVICE: CPT

## 2019-07-17 PROCEDURE — 6360000002 HC RX W HCPCS: Performed by: INTERNAL MEDICINE

## 2019-07-17 PROCEDURE — 83735 ASSAY OF MAGNESIUM: CPT

## 2019-07-17 PROCEDURE — 85025 COMPLETE CBC W/AUTO DIFF WBC: CPT

## 2019-07-17 RX ORDER — 0.9 % SODIUM CHLORIDE 0.9 %
1000 INTRAVENOUS SOLUTION INTRAVENOUS ONCE
Status: COMPLETED | OUTPATIENT
Start: 2019-07-17 | End: 2019-07-17

## 2019-07-17 RX ORDER — DEXAMETHASONE SODIUM PHOSPHATE 10 MG/ML
10 INJECTION INTRAMUSCULAR; INTRAVENOUS ONCE
Status: COMPLETED | OUTPATIENT
Start: 2019-07-17 | End: 2019-07-17

## 2019-07-17 RX ORDER — HEPARIN SODIUM (PORCINE) LOCK FLUSH IV SOLN 100 UNIT/ML 100 UNIT/ML
500 SOLUTION INTRAVENOUS PRN
Status: CANCELLED | OUTPATIENT
Start: 2019-07-17

## 2019-07-17 RX ORDER — SODIUM CHLORIDE 0.9 % (FLUSH) 0.9 %
20 SYRINGE (ML) INJECTION PRN
Status: CANCELLED | OUTPATIENT
Start: 2019-07-17

## 2019-07-17 RX ORDER — HEPARIN SODIUM (PORCINE) LOCK FLUSH IV SOLN 100 UNIT/ML 100 UNIT/ML
500 SOLUTION INTRAVENOUS PRN
Status: DISCONTINUED | OUTPATIENT
Start: 2019-07-17 | End: 2019-07-18 | Stop reason: HOSPADM

## 2019-07-17 RX ORDER — ONDANSETRON 2 MG/ML
4 INJECTION INTRAMUSCULAR; INTRAVENOUS ONCE
Status: COMPLETED | OUTPATIENT
Start: 2019-07-17 | End: 2019-07-17

## 2019-07-17 RX ORDER — SODIUM CHLORIDE 0.9 % (FLUSH) 0.9 %
10 SYRINGE (ML) INJECTION PRN
Status: DISCONTINUED | OUTPATIENT
Start: 2019-07-17 | End: 2019-07-18 | Stop reason: HOSPADM

## 2019-07-17 RX ORDER — SODIUM CHLORIDE 0.9 % (FLUSH) 0.9 %
10 SYRINGE (ML) INJECTION PRN
Status: CANCELLED | OUTPATIENT
Start: 2019-07-17

## 2019-07-17 RX ADMIN — Medication 10 MG: at 10:25

## 2019-07-17 RX ADMIN — Medication 10 ML: at 08:22

## 2019-07-17 RX ADMIN — Medication 10 ML: at 08:23

## 2019-07-17 RX ADMIN — Medication 500 UNITS: at 11:15

## 2019-07-17 RX ADMIN — Medication 10 ML: at 11:15

## 2019-07-17 RX ADMIN — Medication 10 ML: at 08:20

## 2019-07-17 RX ADMIN — Medication 10 ML: at 08:21

## 2019-07-17 RX ADMIN — ONDANSETRON 4 MG: 2 INJECTION INTRAMUSCULAR; INTRAVENOUS at 08:39

## 2019-07-17 RX ADMIN — SODIUM CHLORIDE 1000 ML: 9 INJECTION, SOLUTION INTRAVENOUS at 08:25

## 2019-07-17 NOTE — PROGRESS NOTES
Pt arrives early this morning stating that she has been vomiting and not feeling well since Monday evening (day pump was started). Labs drawn from port and results reviewed. Dr Yanet Roach notified of above and ordered to stop pump if <500mg remaining, 1L NS bolus and 4mg Zofran IV. 255.6mg remaining pump so discontinued and hydration/Zofran started. Pt re-evaluated and stated she is feeling a little better after Zofran. Prior to end of hydration pt vomited after taking sip of Jennifer Mist, 10mg Dexamethasone IV given with more fluids. Pt stated she is ok to go home, educated pt/ to go to ER if pt continues to be unable to keep anything down, both verbalized understanding.  Pt discharged in stable condition and will return on 7-30-19 for follow up with Dr Yanet Roach and Anitha Quinn

## 2019-07-18 ENCOUNTER — HOSPITAL ENCOUNTER (INPATIENT)
Age: 59
LOS: 3 days | Discharge: HOME | DRG: 392 | End: 2019-07-21
Attending: FAMILY MEDICINE | Admitting: FAMILY MEDICINE
Payer: COMMERCIAL

## 2019-07-18 ENCOUNTER — HOSPITAL ENCOUNTER (OUTPATIENT)
Dept: INFUSION THERAPY | Age: 59
Discharge: HOME | DRG: 392 | End: 2019-07-18
Payer: COMMERCIAL

## 2019-07-18 ENCOUNTER — OFFICE VISIT (OUTPATIENT)
Dept: ONCOLOGY | Age: 59
End: 2019-07-18
Payer: COMMERCIAL

## 2019-07-18 ENCOUNTER — TELEPHONE (OUTPATIENT)
Dept: INFUSION THERAPY | Age: 59
End: 2019-07-18

## 2019-07-18 VITALS
TEMPERATURE: 98.3 F | SYSTOLIC BLOOD PRESSURE: 132 MMHG | HEART RATE: 97 BPM | DIASTOLIC BLOOD PRESSURE: 85 MMHG | WEIGHT: 101 LBS | BODY MASS INDEX: 17.34 KG/M2

## 2019-07-18 DIAGNOSIS — C25.1 MALIGNANT NEOPLASM OF BODY OF PANCREAS (HCC): Primary | ICD-10-CM

## 2019-07-18 PROBLEM — R11.2 NAUSEA AND VOMITING: Status: ACTIVE | Noted: 2019-07-18

## 2019-07-18 PROBLEM — E44.0 MODERATE MALNUTRITION (HCC): Status: ACTIVE | Noted: 2019-07-18

## 2019-07-18 PROBLEM — C25.9 PANCREATIC ADENOCARCINOMA (HCC): Status: ACTIVE | Noted: 2018-11-16

## 2019-07-18 PROCEDURE — 96361 HYDRATE IV INFUSION ADD-ON: CPT

## 2019-07-18 PROCEDURE — 96374 THER/PROPH/DIAG INJ IV PUSH: CPT

## 2019-07-18 PROCEDURE — 99255 IP/OBS CONSLTJ NEW/EST HI 80: CPT | Performed by: INTERNAL MEDICINE

## 2019-07-18 PROCEDURE — 6360000002 HC RX W HCPCS: Performed by: CLINICAL NURSE SPECIALIST

## 2019-07-18 PROCEDURE — 2580000003 HC RX 258: Performed by: INTERNAL MEDICINE

## 2019-07-18 PROCEDURE — 1200000000 HC SEMI PRIVATE

## 2019-07-18 PROCEDURE — 6370000000 HC RX 637 (ALT 250 FOR IP): Performed by: CLINICAL NURSE SPECIALIST

## 2019-07-18 PROCEDURE — 2500000003 HC RX 250 WO HCPCS: Performed by: CLINICAL NURSE SPECIALIST

## 2019-07-18 PROCEDURE — 6360000002 HC RX W HCPCS: Performed by: INTERNAL MEDICINE

## 2019-07-18 PROCEDURE — 99222 1ST HOSP IP/OBS MODERATE 55: CPT | Performed by: CLINICAL NURSE SPECIALIST

## 2019-07-18 PROCEDURE — 96375 TX/PRO/DX INJ NEW DRUG ADDON: CPT

## 2019-07-18 PROCEDURE — 96360 HYDRATION IV INFUSION INIT: CPT

## 2019-07-18 RX ORDER — HEPARIN SODIUM (PORCINE) LOCK FLUSH IV SOLN 100 UNIT/ML 100 UNIT/ML
500 SOLUTION INTRAVENOUS PRN
Status: DISCONTINUED | OUTPATIENT
Start: 2019-07-18 | End: 2019-07-19 | Stop reason: HOSPADM

## 2019-07-18 RX ORDER — SODIUM CHLORIDE 0.9 % (FLUSH) 0.9 %
10 SYRINGE (ML) INJECTION PRN
Status: DISCONTINUED | OUTPATIENT
Start: 2019-07-18 | End: 2019-07-19 | Stop reason: HOSPADM

## 2019-07-18 RX ORDER — PROCHLORPERAZINE MALEATE 5 MG/1
10 TABLET ORAL EVERY 6 HOURS PRN
Status: DISCONTINUED | OUTPATIENT
Start: 2019-07-18 | End: 2019-07-21 | Stop reason: HOSPADM

## 2019-07-18 RX ORDER — OLANZAPINE 2.5 MG/1
5 TABLET ORAL PRN
Status: DISCONTINUED | OUTPATIENT
Start: 2019-07-18 | End: 2019-07-21 | Stop reason: HOSPADM

## 2019-07-18 RX ORDER — SODIUM CHLORIDE 0.9 % (FLUSH) 0.9 %
20 SYRINGE (ML) INJECTION PRN
Status: CANCELLED | OUTPATIENT
Start: 2019-07-18

## 2019-07-18 RX ORDER — POTASSIUM CHLORIDE 20 MEQ/1
40 TABLET, EXTENDED RELEASE ORAL PRN
Status: DISCONTINUED | OUTPATIENT
Start: 2019-07-18 | End: 2019-07-21 | Stop reason: HOSPADM

## 2019-07-18 RX ORDER — AMOXICILLIN 250 MG
1 CAPSULE ORAL DAILY PRN
COMMUNITY

## 2019-07-18 RX ORDER — SODIUM CHLORIDE 0.9 % (FLUSH) 0.9 %
10 SYRINGE (ML) INJECTION PRN
Status: CANCELLED | OUTPATIENT
Start: 2019-07-18

## 2019-07-18 RX ORDER — PROMETHAZINE HYDROCHLORIDE 25 MG/1
12.5 TABLET ORAL EVERY 6 HOURS PRN
Status: DISCONTINUED | OUTPATIENT
Start: 2019-07-18 | End: 2019-07-21 | Stop reason: HOSPADM

## 2019-07-18 RX ORDER — LORAZEPAM 2 MG/ML
0.5 INJECTION INTRAMUSCULAR EVERY 6 HOURS PRN
Status: DISCONTINUED | OUTPATIENT
Start: 2019-07-18 | End: 2019-07-21 | Stop reason: HOSPADM

## 2019-07-18 RX ORDER — HALOPERIDOL 5 MG/ML
2 INJECTION INTRAMUSCULAR EVERY 6 HOURS PRN
Status: DISCONTINUED | OUTPATIENT
Start: 2019-07-18 | End: 2019-07-18 | Stop reason: DRUGHIGH

## 2019-07-18 RX ORDER — OXYCODONE HYDROCHLORIDE 5 MG/1
10 TABLET ORAL EVERY 4 HOURS PRN
Status: DISCONTINUED | OUTPATIENT
Start: 2019-07-18 | End: 2019-07-18 | Stop reason: SDUPTHER

## 2019-07-18 RX ORDER — KETOROLAC TROMETHAMINE 15 MG/ML
15 INJECTION, SOLUTION INTRAMUSCULAR; INTRAVENOUS EVERY 6 HOURS PRN
Status: DISCONTINUED | OUTPATIENT
Start: 2019-07-18 | End: 2019-07-18

## 2019-07-18 RX ORDER — OXYCODONE HYDROCHLORIDE 5 MG/1
5 TABLET ORAL EVERY 6 HOURS PRN
Status: DISCONTINUED | OUTPATIENT
Start: 2019-07-18 | End: 2019-07-21 | Stop reason: HOSPADM

## 2019-07-18 RX ORDER — ONDANSETRON 2 MG/ML
4 INJECTION INTRAMUSCULAR; INTRAVENOUS EVERY 6 HOURS PRN
Status: DISCONTINUED | OUTPATIENT
Start: 2019-07-18 | End: 2019-07-21 | Stop reason: HOSPADM

## 2019-07-18 RX ORDER — KETOROLAC TROMETHAMINE 15 MG/ML
15 INJECTION, SOLUTION INTRAMUSCULAR; INTRAVENOUS EVERY 6 HOURS
Status: DISCONTINUED | OUTPATIENT
Start: 2019-07-18 | End: 2019-07-21 | Stop reason: HOSPADM

## 2019-07-18 RX ORDER — OLANZAPINE 5 MG/1
5 TABLET ORAL NIGHTLY
Qty: 30 TABLET | Refills: 2 | Status: SHIPPED | OUTPATIENT
Start: 2019-07-18 | End: 2019-07-20 | Stop reason: HOSPADM

## 2019-07-18 RX ORDER — DEXTROSE, SODIUM CHLORIDE, AND POTASSIUM CHLORIDE 5; .45; .15 G/100ML; G/100ML; G/100ML
INJECTION INTRAVENOUS CONTINUOUS
Status: DISCONTINUED | OUTPATIENT
Start: 2019-07-18 | End: 2019-07-21 | Stop reason: HOSPADM

## 2019-07-18 RX ORDER — HEPARIN SODIUM (PORCINE) LOCK FLUSH IV SOLN 100 UNIT/ML 100 UNIT/ML
500 SOLUTION INTRAVENOUS PRN
Status: CANCELLED | OUTPATIENT
Start: 2019-07-18

## 2019-07-18 RX ORDER — ONDANSETRON 4 MG/1
8 TABLET, ORALLY DISINTEGRATING ORAL EVERY 8 HOURS PRN
Status: DISCONTINUED | OUTPATIENT
Start: 2019-07-18 | End: 2019-07-21 | Stop reason: HOSPADM

## 2019-07-18 RX ORDER — HALOPERIDOL 5 MG/ML
1 INJECTION INTRAMUSCULAR EVERY 6 HOURS PRN
Status: DISCONTINUED | OUTPATIENT
Start: 2019-07-18 | End: 2019-07-21 | Stop reason: HOSPADM

## 2019-07-18 RX ORDER — NICOTINE 21 MG/24HR
1 PATCH, TRANSDERMAL 24 HOURS TRANSDERMAL DAILY PRN
Status: DISCONTINUED | OUTPATIENT
Start: 2019-07-18 | End: 2019-07-18

## 2019-07-18 RX ORDER — SODIUM CHLORIDE 0.9 % (FLUSH) 0.9 %
10 SYRINGE (ML) INJECTION PRN
Status: DISCONTINUED | OUTPATIENT
Start: 2019-07-18 | End: 2019-07-21 | Stop reason: HOSPADM

## 2019-07-18 RX ORDER — SENNA AND DOCUSATE SODIUM 50; 8.6 MG/1; MG/1
1 TABLET, FILM COATED ORAL NIGHTLY PRN
Status: DISCONTINUED | OUTPATIENT
Start: 2019-07-18 | End: 2019-07-21 | Stop reason: HOSPADM

## 2019-07-18 RX ORDER — MAGNESIUM SULFATE 1 G/100ML
1 INJECTION INTRAVENOUS PRN
Status: DISCONTINUED | OUTPATIENT
Start: 2019-07-18 | End: 2019-07-21 | Stop reason: HOSPADM

## 2019-07-18 RX ORDER — ONDANSETRON 2 MG/ML
8 INJECTION INTRAMUSCULAR; INTRAVENOUS ONCE
Status: COMPLETED | OUTPATIENT
Start: 2019-07-18 | End: 2019-07-18

## 2019-07-18 RX ORDER — ACETAMINOPHEN 325 MG/1
650 TABLET ORAL EVERY 4 HOURS PRN
Status: DISCONTINUED | OUTPATIENT
Start: 2019-07-18 | End: 2019-07-21 | Stop reason: HOSPADM

## 2019-07-18 RX ORDER — SODIUM CHLORIDE 0.9 % (FLUSH) 0.9 %
10 SYRINGE (ML) INJECTION EVERY 12 HOURS SCHEDULED
Status: DISCONTINUED | OUTPATIENT
Start: 2019-07-18 | End: 2019-07-21 | Stop reason: HOSPADM

## 2019-07-18 RX ORDER — LIDOCAINE 4 G/G
1 PATCH TOPICAL DAILY
Status: DISCONTINUED | OUTPATIENT
Start: 2019-07-18 | End: 2019-07-19

## 2019-07-18 RX ORDER — SCOLOPAMINE TRANSDERMAL SYSTEM 1 MG/1
1 PATCH, EXTENDED RELEASE TRANSDERMAL
Status: DISCONTINUED | OUTPATIENT
Start: 2019-07-18 | End: 2019-07-21 | Stop reason: HOSPADM

## 2019-07-18 RX ORDER — LORAZEPAM 0.5 MG/1
1 TABLET ORAL EVERY 6 HOURS PRN
COMMUNITY
Start: 2019-02-12

## 2019-07-18 RX ORDER — 0.9 % SODIUM CHLORIDE 0.9 %
1000 INTRAVENOUS SOLUTION INTRAVENOUS ONCE
Status: COMPLETED | OUTPATIENT
Start: 2019-07-18 | End: 2019-07-18

## 2019-07-18 RX ORDER — POTASSIUM CHLORIDE 7.45 MG/ML
10 INJECTION INTRAVENOUS PRN
Status: DISCONTINUED | OUTPATIENT
Start: 2019-07-18 | End: 2019-07-21 | Stop reason: HOSPADM

## 2019-07-18 RX ADMIN — POTASSIUM CHLORIDE, DEXTROSE MONOHYDRATE AND SODIUM CHLORIDE: 150; 5; 450 INJECTION, SOLUTION INTRAVENOUS at 21:06

## 2019-07-18 RX ADMIN — POTASSIUM CHLORIDE, DEXTROSE MONOHYDRATE AND SODIUM CHLORIDE: 150; 5; 450 INJECTION, SOLUTION INTRAVENOUS at 13:11

## 2019-07-18 RX ADMIN — SODIUM CHLORIDE 1000 ML: 9 INJECTION, SOLUTION INTRAVENOUS at 11:02

## 2019-07-18 RX ADMIN — ENOXAPARIN SODIUM 40 MG: 40 INJECTION SUBCUTANEOUS at 15:11

## 2019-07-18 RX ADMIN — HYDROMORPHONE HYDROCHLORIDE 1 MG: 1 INJECTION, SOLUTION INTRAMUSCULAR; INTRAVENOUS; SUBCUTANEOUS at 23:56

## 2019-07-18 RX ADMIN — LORAZEPAM 0.5 MG: 2 INJECTION INTRAMUSCULAR; INTRAVENOUS at 21:06

## 2019-07-18 RX ADMIN — ONDANSETRON 8 MG: 2 INJECTION INTRAMUSCULAR; INTRAVENOUS at 12:21

## 2019-07-18 RX ADMIN — HYDROMORPHONE HYDROCHLORIDE 1 MG: 1 INJECTION, SOLUTION INTRAMUSCULAR; INTRAVENOUS; SUBCUTANEOUS at 19:56

## 2019-07-18 RX ADMIN — KETOROLAC TROMETHAMINE 15 MG: 15 INJECTION, SOLUTION INTRAMUSCULAR; INTRAVENOUS at 19:56

## 2019-07-18 RX ADMIN — Medication 10 ML: at 11:01

## 2019-07-18 RX ADMIN — KETOROLAC TROMETHAMINE 15 MG: 15 INJECTION, SOLUTION INTRAMUSCULAR; INTRAVENOUS at 13:34

## 2019-07-18 ASSESSMENT — PAIN DESCRIPTION - LOCATION
LOCATION: ABDOMEN
LOCATION: ABDOMEN

## 2019-07-18 ASSESSMENT — ENCOUNTER SYMPTOMS
NAUSEA: 1
DIARRHEA: 1
ALLERGIC/IMMUNOLOGIC NEGATIVE: 1
RESPIRATORY NEGATIVE: 1
BACK PAIN: 1
BLOOD IN STOOL: 0
EYES NEGATIVE: 1
ABDOMINAL PAIN: 1
VOMITING: 1

## 2019-07-18 ASSESSMENT — PAIN DESCRIPTION - PAIN TYPE
TYPE: CHRONIC PAIN
TYPE: CHRONIC PAIN;ACUTE PAIN
TYPE: CHRONIC PAIN

## 2019-07-18 ASSESSMENT — PAIN SCALES - GENERAL
PAINLEVEL_OUTOF10: 4
PAINLEVEL_OUTOF10: 7
PAINLEVEL_OUTOF10: 5
PAINLEVEL_OUTOF10: 3
PAINLEVEL_OUTOF10: 7
PAINLEVEL_OUTOF10: 3
PAINLEVEL_OUTOF10: 7
PAINLEVEL_OUTOF10: 7

## 2019-07-18 NOTE — PROGRESS NOTES
[facility] was direct [facility] Room [facility]. Pt was transported by wheelchair with writer and family at 56. Report given to Marley Laguna RN over the phone.

## 2019-07-18 NOTE — PROGRESS NOTES
Pt here for N/V since Sunday. Pt also had incontinent diarrhea upon arrival. Reports has not been eating or drinking since Sunday as well. Hydration started as ordered and pt took Ativan po home med around 1130am. Dr. Tapia Carly up to see pt and orders rec'd to give Zofran 8mg IV now and then direct admit pt to 3rd floor. Report given to Samia Leon to care over care.

## 2019-07-18 NOTE — CONSULTS
DIAGNOSIS:   1. Pancreatic invasive ductal adenocarcinoma, stage III  2. Liver lesions, metastatic disease 05/2019  3. 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 FOLFIRINOX - started 12/04/2018, received three cycles. With radiologic response  2. Palliative therapy / rehab for the LE weakness   3. Plan for chemotherapy  4. Considering SBRT to primary tumor  5. Considering clinical trial     BRIEF CASE HISTORY:  Candy Queen is a very pleasant 66 y.o. female who is referred to us for pancreatic cancer. She reports abdominal pain started 09/2018, worse at night, she consulted with PCP and she started acid reducer. She was referred to GI and EGD and colonoscopy was done - both negative, CT was done showing mass in the body of the pancreas measuring 3.6 x 2.6 x 2.5 cm. She underwent endoscopic ultrasound for biopsy, pathology confirmed pancreatic invasive ductal adenocarcinoma. She has had some weight loss, she has had a lot of stress and is unsure if it is disease related. She has had insomnia due to discomfort. She has consult scheduled with surgeon. She underwent diagnostic laparoscopy. Mother had breast cancer. She has no history of chemical or radiation exposure. Patient [facility]. Intraoperatively, a liver lesion was appreciated. Biopsy was negative for metastatic disease. The patient was found to have locally advanced disease and the recommendation was to proceed with neoadjuvant chemotherapy and possibly radiation depending on response. Treat with FOLFIRINOX was started 12/04/2018, she experienced significant nausea and opoid induced constipation. Following cycle #3 she underwent celiac block, she experienced significant neuropathy and work up indicated cord infarction. She was released to rehab facility and is undergoing PT. CT done 01/22/2019 showed decrease in pancreatic mass.    Decided to hold oxaliplatin Respiratory: No chest pain, no shortness of breath, no cough or hemoptysis. Cardiovascular: Denies chest pain, PND or orthopnea or palpitations. + LE swelling  Gastrointestinal:+ diarrhea + abdominal pain - minimal; +++nausea and vomiting  Genitourinary: Denies dysuria, hematuria, frequency, urgency or incontinence. Neurological: Denies headaches, decreased LOC, no sensory or motor focal deficits. Sensory neuropathy around L2 - improving. Musculoskeletal: No arthralgia no back pain or joint swelling. Skin: There are no rashes or bleeding.+grade 1 bedsore in sacral area - improving; wound on right heel is healed  Psychiatric:  No anxiety, no depression. Endocrine: No diabetes or thyroid disease. Hematologic: No bleeding, no adenopathy. PHYSICAL EXAM: Shows an ill appearing 66y.o.-year-old female who is not in pain or distress. Vital Signs: Blood pressure 132/85, pulse 97, temperature 98.3 °F (36.8 °C), temperature source Oral, weight 101 lb (45.8 kg), last menstrual period 09/07/2012, not currently breastfeeding. HEENT: Normocephalic and atraumatic. Pupils are equal, round, reactive to light and accommodation. Extraocular muscles are intact. Neck: Showed no JVD, no carotid bruit . Lungs: Clear to auscultation bilaterally. Heart: Regular without any murmur. Abdomen: Soft, nontender. No hepatosplenomegaly. Surgery for laparoscopy is healing well. Extremities: Showed trace edema bilaterally breasts: Examination not done today.  Neuro exam: intact cranial nerves bilaterally, she continues to have weakness and numbness in both lower extremities but continued improvement compared to last exam Lymphatic: no adenopathy appreciated in the supraclavicular, axillary, cervical or inguinal area  Skin: grade 1 bedsore in sacral area    REVIEW OF LABORATORY DATA:   Lab Results   Component Value Date    WBC 5.2 07/17/2019    HGB 12.6 07/17/2019    HCT 37.6 07/17/2019    MCV 85.9 07/17/2019     07/17/2019

## 2019-07-18 NOTE — PROGRESS NOTES
Nutrition Assessment    Type and Reason for Visit: Initial    Nutrition Recommendations:   1) continue clear liquid diet as clinically appropriate with diet advancement once able to tolerate PO intakes. 2)  Recommend Ensure Clear while on clear liquids. 3) Recommend Consider Creon 24,000 units with meals. Nutrition Assessment: Pt familiar to writer from cancer center. Pt reports surgery in June with CHARBEL drain x 7 weeks. Drain recently fell out and chemo was reinitiated on Monday, which is when pt started feeling poorly. Pt states prior to this was eating well, but noticed weight was trending down. Pt reports bloating, cramping, oily/light colored stools. Amber Click CNS in room at time of nutrition assessment and agrees with Creon with meals once diet advancd. Pt indicated would like this to be communicated with Dr Rina Amaya, which writer agreed and would discuss. RD educated pt on role of pancreas and role of Creon. Pt and  understanding. Pt has experienced 15# wt loss x 2 months r/t surgery, possibly fat malabsorption, catabolic illness. Noted CA 19-9 rising unfortunately. Malnutrition Assessment:  · Malnutrition Status: Meets the criteria for moderate malnutrition  · Context: Chronic illness  · Findings of the 6 clinical characteristics of malnutrition (Minimum of 2 out of 6 clinical characteristics is required to make the diagnosis of moderate or severe Protein Calorie Malnutrition based on AND/ASPEN Guidelines):  1. Energy Intake-Less than or equal to 75% of estimated energy requirement(predicated malabsorption effecting caloric utilization), Greater than or equal to 1 month    2. Weight Loss-10% loss or greater, in 1 month  3. Fat Loss-Moderate subcutaneous fat loss, Orbital  4.  Muscle Loss-Moderate muscle mass loss, Temples (temporalis muscle), Clavicles (pectoralis and deltoids)    Nutrition Risk Level: High    Nutrient Needs:  · Estimated Daily Total Kcal: 2001-3545 kcal/d  · Estimated Daily

## 2019-07-19 LAB
ALBUMIN SERPL-MCNC: 3.2 G/DL (ref 3.5–5.2)
ALBUMIN/GLOBULIN RATIO: 1.5 (ref 1–2.5)
ALP BLD-CCNC: 40 U/L (ref 35–104)
ALT SERPL-CCNC: 11 U/L (ref 5–33)
ANION GAP SERPL CALCULATED.3IONS-SCNC: 7 MMOL/L (ref 9–17)
AST SERPL-CCNC: 12 U/L
BILIRUB SERPL-MCNC: 0.9 MG/DL (ref 0.3–1.2)
BUN BLDV-MCNC: 13 MG/DL (ref 6–20)
BUN/CREAT BLD: ABNORMAL (ref 9–20)
CALCIUM SERPL-MCNC: 8.8 MG/DL (ref 8.6–10.4)
CHLORIDE BLD-SCNC: 106 MMOL/L (ref 98–107)
CO2: 26 MMOL/L (ref 20–31)
CREAT SERPL-MCNC: 0.48 MG/DL (ref 0.5–0.9)
GFR AFRICAN AMERICAN: >60 ML/MIN
GFR NON-AFRICAN AMERICAN: >60 ML/MIN
GFR SERPL CREATININE-BSD FRML MDRD: ABNORMAL ML/MIN/{1.73_M2}
GFR SERPL CREATININE-BSD FRML MDRD: ABNORMAL ML/MIN/{1.73_M2}
GLUCOSE BLD-MCNC: 105 MG/DL (ref 70–99)
HCT VFR BLD CALC: 29.4 % (ref 36–46)
HEMOGLOBIN: 9.8 G/DL (ref 12–16)
INR BLD: 1.1
MCH RBC QN AUTO: 29 PG (ref 26–34)
MCHC RBC AUTO-ENTMCNC: 33.5 G/DL (ref 31–37)
MCV RBC AUTO: 86.7 FL (ref 80–100)
NRBC AUTOMATED: ABNORMAL PER 100 WBC
PDW BLD-RTO: 13.8 % (ref 12.5–15.4)
PLATELET # BLD: 140 K/UL (ref 140–450)
PMV BLD AUTO: 8.5 FL (ref 6–12)
POTASSIUM SERPL-SCNC: 3.6 MMOL/L (ref 3.7–5.3)
PROTHROMBIN TIME: 11.3 SEC (ref 9.4–12.6)
RBC # BLD: 3.39 M/UL (ref 4–5.2)
SODIUM BLD-SCNC: 139 MMOL/L (ref 135–144)
TOTAL PROTEIN: 5.4 G/DL (ref 6.4–8.3)
TSH SERPL DL<=0.05 MIU/L-ACNC: 4.37 MIU/L (ref 0.3–5)
WBC # BLD: 2 K/UL (ref 3.5–11)

## 2019-07-19 PROCEDURE — 80053 COMPREHEN METABOLIC PANEL: CPT

## 2019-07-19 PROCEDURE — 6360000002 HC RX W HCPCS: Performed by: CLINICAL NURSE SPECIALIST

## 2019-07-19 PROCEDURE — 2580000003 HC RX 258: Performed by: CLINICAL NURSE SPECIALIST

## 2019-07-19 PROCEDURE — 85027 COMPLETE CBC AUTOMATED: CPT

## 2019-07-19 PROCEDURE — 6370000000 HC RX 637 (ALT 250 FOR IP): Performed by: FAMILY MEDICINE

## 2019-07-19 PROCEDURE — 99222 1ST HOSP IP/OBS MODERATE 55: CPT | Performed by: INTERNAL MEDICINE

## 2019-07-19 PROCEDURE — 99232 SBSQ HOSP IP/OBS MODERATE 35: CPT | Performed by: FAMILY MEDICINE

## 2019-07-19 PROCEDURE — 2500000003 HC RX 250 WO HCPCS: Performed by: CLINICAL NURSE SPECIALIST

## 2019-07-19 PROCEDURE — 97166 OT EVAL MOD COMPLEX 45 MIN: CPT

## 2019-07-19 PROCEDURE — 97530 THERAPEUTIC ACTIVITIES: CPT

## 2019-07-19 PROCEDURE — 1200000000 HC SEMI PRIVATE

## 2019-07-19 PROCEDURE — 6370000000 HC RX 637 (ALT 250 FOR IP): Performed by: CLINICAL NURSE SPECIALIST

## 2019-07-19 PROCEDURE — 97162 PT EVAL MOD COMPLEX 30 MIN: CPT

## 2019-07-19 PROCEDURE — 36415 COLL VENOUS BLD VENIPUNCTURE: CPT

## 2019-07-19 PROCEDURE — 84443 ASSAY THYROID STIM HORMONE: CPT

## 2019-07-19 PROCEDURE — 85610 PROTHROMBIN TIME: CPT

## 2019-07-19 PROCEDURE — 97535 SELF CARE MNGMENT TRAINING: CPT

## 2019-07-19 RX ORDER — LIDOCAINE 4 G/G
2 PATCH TOPICAL DAILY
Status: DISCONTINUED | OUTPATIENT
Start: 2019-07-19 | End: 2019-07-21 | Stop reason: HOSPADM

## 2019-07-19 RX ADMIN — ENOXAPARIN SODIUM 40 MG: 40 INJECTION SUBCUTANEOUS at 14:56

## 2019-07-19 RX ADMIN — PANCRELIPASE 1 CAPSULE: 24000; 76000; 120000 CAPSULE, DELAYED RELEASE PELLETS ORAL at 12:13

## 2019-07-19 RX ADMIN — KETOROLAC TROMETHAMINE 15 MG: 15 INJECTION, SOLUTION INTRAMUSCULAR; INTRAVENOUS at 19:52

## 2019-07-19 RX ADMIN — OXYCODONE HYDROCHLORIDE 5 MG: 5 TABLET ORAL at 09:55

## 2019-07-19 RX ADMIN — HYDROMORPHONE HYDROCHLORIDE 0.5 MG: 1 INJECTION, SOLUTION INTRAMUSCULAR; INTRAVENOUS; SUBCUTANEOUS at 15:16

## 2019-07-19 RX ADMIN — POTASSIUM CHLORIDE, DEXTROSE MONOHYDRATE AND SODIUM CHLORIDE: 150; 5; 450 INJECTION, SOLUTION INTRAVENOUS at 03:54

## 2019-07-19 RX ADMIN — ONDANSETRON 4 MG: 2 INJECTION INTRAMUSCULAR; INTRAVENOUS at 19:53

## 2019-07-19 RX ADMIN — PANCRELIPASE 1 CAPSULE: 24000; 76000; 120000 CAPSULE, DELAYED RELEASE PELLETS ORAL at 17:17

## 2019-07-19 RX ADMIN — KETOROLAC TROMETHAMINE 15 MG: 15 INJECTION, SOLUTION INTRAMUSCULAR; INTRAVENOUS at 09:55

## 2019-07-19 RX ADMIN — POTASSIUM CHLORIDE, DEXTROSE MONOHYDRATE AND SODIUM CHLORIDE: 150; 5; 450 INJECTION, SOLUTION INTRAVENOUS at 14:55

## 2019-07-19 RX ADMIN — KETOROLAC TROMETHAMINE 15 MG: 15 INJECTION, SOLUTION INTRAMUSCULAR; INTRAVENOUS at 03:54

## 2019-07-19 RX ADMIN — OXYCODONE HYDROCHLORIDE 5 MG: 5 TABLET ORAL at 19:52

## 2019-07-19 RX ADMIN — KETOROLAC TROMETHAMINE 15 MG: 15 INJECTION, SOLUTION INTRAMUSCULAR; INTRAVENOUS at 14:54

## 2019-07-19 RX ADMIN — LORAZEPAM 0.5 MG: 2 INJECTION INTRAMUSCULAR; INTRAVENOUS at 22:14

## 2019-07-19 RX ADMIN — HYDROMORPHONE HYDROCHLORIDE 1 MG: 1 INJECTION, SOLUTION INTRAMUSCULAR; INTRAVENOUS; SUBCUTANEOUS at 03:54

## 2019-07-19 RX ADMIN — Medication 10 ML: at 14:54

## 2019-07-19 ASSESSMENT — ENCOUNTER SYMPTOMS
SHORTNESS OF BREATH: 0
NAUSEA: 0
COUGH: 0
VOMITING: 0

## 2019-07-19 ASSESSMENT — PAIN DESCRIPTION - FREQUENCY
FREQUENCY: CONTINUOUS

## 2019-07-19 ASSESSMENT — PAIN DESCRIPTION - LOCATION
LOCATION: ABDOMEN
LOCATION: ABDOMEN;BACK
LOCATION: ABDOMEN;BACK
LOCATION: ABDOMEN
LOCATION: ABDOMEN;BACK
LOCATION: ABDOMEN
LOCATION: ABDOMEN

## 2019-07-19 ASSESSMENT — PAIN DESCRIPTION - PROGRESSION: CLINICAL_PROGRESSION: GRADUALLY IMPROVING

## 2019-07-19 ASSESSMENT — PAIN DESCRIPTION - PAIN TYPE
TYPE: ACUTE PAIN

## 2019-07-19 ASSESSMENT — PAIN SCALES - GENERAL
PAINLEVEL_OUTOF10: 2
PAINLEVEL_OUTOF10: 5
PAINLEVEL_OUTOF10: 2
PAINLEVEL_OUTOF10: 2
PAINLEVEL_OUTOF10: 7
PAINLEVEL_OUTOF10: 5
PAINLEVEL_OUTOF10: 3
PAINLEVEL_OUTOF10: 5
PAINLEVEL_OUTOF10: 3
PAINLEVEL_OUTOF10: 4
PAINLEVEL_OUTOF10: 5

## 2019-07-19 ASSESSMENT — PAIN DESCRIPTION - DESCRIPTORS
DESCRIPTORS: DISCOMFORT;ACHING
DESCRIPTORS: ACHING;DISCOMFORT
DESCRIPTORS: DISCOMFORT
DESCRIPTORS: ACHING;DISCOMFORT

## 2019-07-19 ASSESSMENT — PAIN DESCRIPTION - ORIENTATION: ORIENTATION: LEFT

## 2019-07-19 ASSESSMENT — PAIN DESCRIPTION - ONSET: ONSET: ON-GOING

## 2019-07-19 ASSESSMENT — PAIN - FUNCTIONAL ASSESSMENT: PAIN_FUNCTIONAL_ASSESSMENT: PREVENTS OR INTERFERES SOME ACTIVE ACTIVITIES AND ADLS

## 2019-07-19 NOTE — PLAN OF CARE
Problem: Pain:  Description  Pain management should include both nonpharmacologic and pharmacologic interventions. Goal: Pain level will decrease  Description  Pain level will decrease  Outcome: Ongoing  Goal: Control of acute pain  Description  Control of acute pain  Outcome: Ongoing  Goal: Control of chronic pain  Description  Control of chronic pain  Outcome: Ongoing   Patient will report pain using the 0-10 scale. Pain medication ordered will be administered as needed for pain in a timely manor. Non-pharmaceutical interventions provided and refused at this time. Will continue to monitor    Problem: Nutrition  Goal: Optimal nutrition therapy  Outcome: Ongoing     Problem: Falls - Risk of:  Goal: Will remain free from falls  Description  Will remain free from falls  7/19/2019 0449 by Paola Allen RN  Outcome: Ongoing  7/18/2019 1953 by Mendel Power RN  Outcome: Met This Shift  Note:   Falling star program in place. Side rails up x2. Call light and personal belongings within reach. Continuing to maintain safe environment. Bed in lowest position and locked. Appropriate Identification armbands in place. Non-skid foot wear in place. Continue to monitor. 7/18/2019 1950 by Mendel Power RN  Outcome: Met This Shift  Note:   Falling star program in place. Side rails up x2. Call light and personal belongings within reach. Continuing to maintain safe environment. Bed in lowest position and locked. Appropriate Identification armbands in place. Non-skid foot wear in place. Continue to monitor.    Goal: Absence of physical injury  Description  Absence of physical injury  Outcome: Ongoing     Problem: Nausea/Vomiting:  Goal: Absence of nausea/vomiting  Description  Absence of nausea/vomiting  7/19/2019 0449 by Paola Allen RN  Outcome: Ongoing  7/18/2019 1953 by Mendel Power RN  Outcome: Ongoing  Note:   Patient states nausea is improving; patient receiving IV fluids per orders; patient tolerating diet; continue to monitor. 7/18/2019 1950 by Aracelis Catherine RN  Outcome: Ongoing  Note:   Patient states nausea is better; patient receiving IV fluids per orders for hydration; patient tolerating diet; continue to monitor.   Goal: Able to drink  Description  Able to drink  Outcome: Ongoing  Goal: Able to eat  Description  Able to eat  Outcome: Ongoing  Goal: Ability to achieve adequate nutritional intake will improve  Description  Ability to achieve adequate nutritional intake will improve  Outcome: Ongoing

## 2019-07-20 PROCEDURE — 6360000002 HC RX W HCPCS: Performed by: CLINICAL NURSE SPECIALIST

## 2019-07-20 PROCEDURE — 97110 THERAPEUTIC EXERCISES: CPT

## 2019-07-20 PROCEDURE — 99232 SBSQ HOSP IP/OBS MODERATE 35: CPT | Performed by: INTERNAL MEDICINE

## 2019-07-20 PROCEDURE — 2500000003 HC RX 250 WO HCPCS: Performed by: CLINICAL NURSE SPECIALIST

## 2019-07-20 PROCEDURE — 97116 GAIT TRAINING THERAPY: CPT

## 2019-07-20 PROCEDURE — 6370000000 HC RX 637 (ALT 250 FOR IP): Performed by: FAMILY MEDICINE

## 2019-07-20 PROCEDURE — 6370000000 HC RX 637 (ALT 250 FOR IP): Performed by: CLINICAL NURSE SPECIALIST

## 2019-07-20 PROCEDURE — 99233 SBSQ HOSP IP/OBS HIGH 50: CPT | Performed by: FAMILY MEDICINE

## 2019-07-20 PROCEDURE — 1200000000 HC SEMI PRIVATE

## 2019-07-20 RX ORDER — SCOLOPAMINE TRANSDERMAL SYSTEM 1 MG/1
1 PATCH, EXTENDED RELEASE TRANSDERMAL
Qty: 3 PATCH | Refills: 0 | Status: SHIPPED | OUTPATIENT
Start: 2019-07-21 | End: 2019-08-13 | Stop reason: SDUPTHER

## 2019-07-20 RX ADMIN — ONDANSETRON 4 MG: 2 INJECTION INTRAMUSCULAR; INTRAVENOUS at 16:16

## 2019-07-20 RX ADMIN — PANCRELIPASE 1 CAPSULE: 24000; 76000; 120000 CAPSULE, DELAYED RELEASE PELLETS ORAL at 12:13

## 2019-07-20 RX ADMIN — KETOROLAC TROMETHAMINE 15 MG: 15 INJECTION, SOLUTION INTRAMUSCULAR; INTRAVENOUS at 19:38

## 2019-07-20 RX ADMIN — KETOROLAC TROMETHAMINE 15 MG: 15 INJECTION, SOLUTION INTRAMUSCULAR; INTRAVENOUS at 02:57

## 2019-07-20 RX ADMIN — PANCRELIPASE 1 CAPSULE: 24000; 76000; 120000 CAPSULE, DELAYED RELEASE PELLETS ORAL at 08:23

## 2019-07-20 RX ADMIN — KETOROLAC TROMETHAMINE 15 MG: 15 INJECTION, SOLUTION INTRAMUSCULAR; INTRAVENOUS at 15:09

## 2019-07-20 RX ADMIN — OXYCODONE HYDROCHLORIDE 5 MG: 5 TABLET ORAL at 19:37

## 2019-07-20 RX ADMIN — ENOXAPARIN SODIUM 40 MG: 40 INJECTION SUBCUTANEOUS at 18:51

## 2019-07-20 RX ADMIN — OXYCODONE HYDROCHLORIDE 5 MG: 5 TABLET ORAL at 13:27

## 2019-07-20 RX ADMIN — OXYCODONE HYDROCHLORIDE 5 MG: 5 TABLET ORAL at 07:32

## 2019-07-20 RX ADMIN — PANCRELIPASE 1 CAPSULE: 24000; 76000; 120000 CAPSULE, DELAYED RELEASE PELLETS ORAL at 18:51

## 2019-07-20 RX ADMIN — POTASSIUM CHLORIDE, DEXTROSE MONOHYDRATE AND SODIUM CHLORIDE: 150; 5; 450 INJECTION, SOLUTION INTRAVENOUS at 08:23

## 2019-07-20 RX ADMIN — LORAZEPAM 0.5 MG: 2 INJECTION INTRAMUSCULAR; INTRAVENOUS at 21:11

## 2019-07-20 RX ADMIN — KETOROLAC TROMETHAMINE 15 MG: 15 INJECTION, SOLUTION INTRAMUSCULAR; INTRAVENOUS at 08:27

## 2019-07-20 ASSESSMENT — PAIN DESCRIPTION - DESCRIPTORS
DESCRIPTORS: ACHING
DESCRIPTORS: DISCOMFORT
DESCRIPTORS: ACHING

## 2019-07-20 ASSESSMENT — PAIN SCALES - GENERAL
PAINLEVEL_OUTOF10: 7
PAINLEVEL_OUTOF10: 4
PAINLEVEL_OUTOF10: 5
PAINLEVEL_OUTOF10: 6
PAINLEVEL_OUTOF10: 0
PAINLEVEL_OUTOF10: 5
PAINLEVEL_OUTOF10: 4
PAINLEVEL_OUTOF10: 7
PAINLEVEL_OUTOF10: 7
PAINLEVEL_OUTOF10: 6
PAINLEVEL_OUTOF10: 7
PAINLEVEL_OUTOF10: 3

## 2019-07-20 ASSESSMENT — PAIN DESCRIPTION - ONSET
ONSET: ON-GOING

## 2019-07-20 ASSESSMENT — PAIN DESCRIPTION - FREQUENCY
FREQUENCY: INTERMITTENT
FREQUENCY: CONTINUOUS

## 2019-07-20 ASSESSMENT — PAIN DESCRIPTION - LOCATION
LOCATION: ABDOMEN
LOCATION: ABDOMEN
LOCATION: GENERALIZED
LOCATION: ABDOMEN

## 2019-07-20 ASSESSMENT — PAIN DESCRIPTION - PAIN TYPE
TYPE: ACUTE PAIN
TYPE: CHRONIC PAIN
TYPE: ACUTE PAIN
TYPE: SURGICAL PAIN;CHRONIC PAIN
TYPE: CHRONIC PAIN

## 2019-07-20 ASSESSMENT — PAIN - FUNCTIONAL ASSESSMENT: PAIN_FUNCTIONAL_ASSESSMENT: PREVENTS OR INTERFERES SOME ACTIVE ACTIVITIES AND ADLS

## 2019-07-20 ASSESSMENT — PAIN SCALES - WONG BAKER: WONGBAKER_NUMERICALRESPONSE: 0

## 2019-07-20 ASSESSMENT — PAIN DESCRIPTION - PROGRESSION
CLINICAL_PROGRESSION: NOT CHANGED
CLINICAL_PROGRESSION: GRADUALLY IMPROVING
CLINICAL_PROGRESSION: NOT CHANGED

## 2019-07-20 ASSESSMENT — PAIN DESCRIPTION - ORIENTATION: ORIENTATION: LEFT

## 2019-07-20 NOTE — PROGRESS NOTES
DIAGNOSIS:   1. Pancreatic invasive ductal adenocarcinoma, stage III  2. Liver lesions, metastatic disease 05/2019  3. 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 FOLFIRINOX - started 12/04/2018, received three cycles. With radiologic response  2. Palliative therapy / rehab for the LE weakness   3. Plan for chemotherapy  4. Considering SBRT to primary tumor  5. Considering clinical trial     BRIEF CASE HISTORY:  Bhargavi Celaya is a very pleasant 66 y.o. female who is referred to us for pancreatic cancer. She reports abdominal pain started 09/2018, worse at night, she consulted with PCP and she started acid reducer. She was referred to GI and EGD and colonoscopy was done - both negative, CT was done showing mass in the body of the pancreas measuring 3.6 x 2.6 x 2.5 cm. She underwent endoscopic ultrasound for biopsy, pathology confirmed pancreatic invasive ductal adenocarcinoma. She has had some weight loss, she has had a lot of stress and is unsure if it is disease related. She has had insomnia due to discomfort. She has consult scheduled with surgeon. She underwent diagnostic laparoscopy. Mother had breast cancer. She has no history of chemical or radiation exposure. Patient was seen by [facility] for [facility]oscopy. Intraoperatively, a liver lesion was appreciated. Biopsy was negative for metastatic disease. The patient was found to have locally advanced disease and the recommendation was to proceed with neoadjuvant chemotherapy and possibly radiation depending on response. Treat with FOLFIRINOX was started 12/04/2018, she experienced significant nausea and opoid induced constipation. Following cycle #3 she underwent celiac block, she experienced significant neuropathy and work up indicated cord infarction. She was released to rehab facility and is undergoing PT. CT done 01/22/2019 showed decrease in pancreatic mass.    Decided to hold oxaliplatin image 29 are noted compatible with surface implants versus intraparenchymal metastatic deposits. The previously seen fluid density liver lesions, likely representing cysts are otherwise unchanged. No biliary dilatation. The spleen, adrenals and kidneys reveal no acute findings. GI/Bowel: The visualized bowel is normal in caliber. Pancreatic mass abutment of the adjacent posterior wall the stomach is noted without evidence for direct invasion. Peritoneum/Retroperitoneum: No ascites. Mild induration of the peritoneal fat is noted, a new finding. A peritoneal catheter is noted, partially visualized in the left abdomen. No free air. Confluent abnormal lymphadenopathy is noted surrounding the juxta aortic region with discrete abnormal lymph nodes extending along the iliac chain bilaterally. Bones/Soft Tissues: No acute osseous abnormality identified. Interval progression of disease as evidenced by advancing local infiltration and surface implants on the liver. Mild induration of the peritoneal fat is noted, favored to represent peritoneal carcinomatosis versus inflammation. Partially visualized left peritoneal catheter. No significant ascites. IMPRESSION:   1. Invasive ductal adenocarcinoma of the pancreas, stage III  2. Uche adjuvant FOLFIRINOX started 12/04/2018  3. Liver lesions found, removed during surgery 05/2019 and pathology confirmed metastatic disease  4. Chemotherapy-induced nausea and vomiting  5.  Chemotherapy-induced diarrhea    PLAN:     Overall pt feels better   Okay to advance diet   Continue pain control  Continue prn anti emetic   ivf   Okay to d/c once diarrhea and nausea is controlled   Pt advised to continue pancretic enzyme supplementaion with each meal   No intervention planned from oncology standpoint at this time   Out pt f/u after d/c to discuss tx plan     Pravin Guadarrama

## 2019-07-20 NOTE — PROGRESS NOTES
Balance  Posture: Good  Sitting - Static: Good  Sitting - Dynamic: Good  Standing - Static: Fair;+  Standing - Dynamic: +;Fair  Comments: standing balance assessed with SPC  Exercises  Comments: standing exes: 3 way hipx10, heel [id number], sit to stands x3                        G-Code     OutComes Score                                                     AM-PAC Score             Goals  Short term goals  Time Frame for Short term goals: 14 visits  Short term goal 1: Pt to ambulate 350ft modified independently with single point cane to allow for return to prior functional level  Short term goal 2: Pt to sit <> stand transfer independently to allow for return to prior functional level  Short term goal 3: Pt to tolerate 30 minutes of therapy for endurance  Short term goal 4: Pt to ascend/descend three steps with one rail SBA to allow for safe entrance/exit into the home  Short term goal 5: Pt to demonstrate good - standing balance with use of assistive device to decrease risk of falls  Patient Goals   Patient goals : Pt would like to get back to her normal mobility    Plan    Plan  Times per week: 5-6x  Times per day: Daily  Current Treatment Recommendations: Strengthening, Functional Mobility Training, Transfer Training, Balance Training, Endurance Training, Gait Training, Stair training, Neuromuscular Re-education, Home Exercise Program, Safety Education & Training, Patient/Caregiver Education & Training  Safety Devices  Type of devices:  All fall risk precautions in place, Gait belt, Patient at risk for falls, Left in bed, Call light within reach, Nurse notified  Restraints  Initially in place: No     Therapy Time   Individual Concurrent Group Co-treatment   Time In 1213         Time Out 1251         Minutes [address], PT

## 2019-07-20 NOTE — PROGRESS NOTES
Progress Note    7/20/2019 11:51 AM  Subjective: Interval History: had a lot of diarrhea yesterday, non so far today  Nausea is better, tolerated po this am.     Spouse present. Diet: Dietary Nutrition Supplements: Clear Liquid Oral Supplement  DIET GENERAL;    Medications:   Reviewed medications    Labs:   CBC:   Recent Labs     07/19/19 0626   WBC 2.0*   HGB 9.8*        BMP:    Recent Labs     07/19/19 0626      K 3.6*      CO2 26   BUN 13   CREATININE 0.48*   GLUCOSE 105*     INR:   Recent Labs     07/19/19 0626   INR 1.1         Objective:   Vitals: /73   Pulse 70   Temp 98.5 °F (36.9 °C) (Oral)   Resp 18   Ht 5' 7\" (1.702 m)   Wt 107 lb 2.3 oz (48.6 kg)   LMP 09/07/2012   SpO2 98%   BMI 16.78 kg/m²   General appearance: alert and cooperative with exam  Neck: no adenopathy, no carotid bruit and thyroid not enlarged, symmetric, no tenderness/mass/nodules  Lungs: clear to auscultation bilaterally  Heart: regular rate and rhythm, S1, S2 normal, no murmur, click, rub or gallop  Abdomen: normal findings: aorta normal, bowel sounds normal and no masses palpable and tender in epigastrium but no gaurding  Extremities: extremities normal, atraumatic, no cyanosis or edema  Neurologic: Mental status: Alert, oriented, thought content appropriate    Assessment and Plan:    CINV (chemotherapy-induced nausea and vomiting)    P: if tolerates po and no unusual diarrhea then can go home later today.  If not then try to d/c tomorrow    Patient Active Problem List:     Gastritis without bleeding     Pancreatic adenocarcinoma (HCC)     Anxiety     CINV (chemotherapy-induced nausea and vomiting)     Epigastric pain     Weakness     Abnormal loss of weight     Partial paralysis of both lower limbs (HCC)     Moderate protein-calorie malnutrition (HCC)     Spinal cord infarction (HCC)     Weakness of both lower extremities     Pressure ulcer of coccygeal region, stage 2     Nausea and vomiting

## 2019-07-20 NOTE — PLAN OF CARE
Problem: Pain:  Goal: Pain level will decrease  Description  Pain level will decrease  Outcome: Ongoing  Patient continues to complain of pain. Medicated with Roxicodone and Toradol. Goal: Control of acute pain  Description  Control of acute pain  Outcome: Ongoing  Goal: Control of chronic pain  Description  Control of chronic pain  Outcome: Ongoing     Problem: Nutrition  Goal: Optimal nutrition therapy  Outcome: Ongoing  Patient able to tolerate meals. Problem: Falls - Risk of:  Goal: Will remain free from falls  Description  Will remain free from falls  Outcome: Ongoing  Patient is a fall risk during this admission. Fall risk assessment was performed. Patient is absent of falls. Bed is in the lowest position. Wheels on the bed are locked. Call light and bed side table are within reach. Clutter is removed. Patient was educated to call out when needing assistance or wanting to get out of bed. Patient offered toileting assistance during rounding. Hourly rounds have been performed. Goal: Absence of physical injury  Description  Absence of physical injury  Outcome: Ongoing  No falls or injuries this shift      Problem: Nausea/Vomiting:  Goal: Absence of nausea/vomiting  Description  Absence of nausea/vomiting  Outcome: Ongoing  Patient complained of nausea again this afternoon. Medicated with zofran with some relief. No vomiting. Able to tolerate some soup for dinner. Goal: Able to drink  Description  Able to drink  Outcome: Ongoing  Goal: Able to eat  Description  Able to eat  Outcome: Ongoing  Able to tolerate meals today with no vomiting.    Goal: Ability to achieve adequate nutritional intake will improve  Description  Ability to achieve adequate nutritional intake will improve  Outcome: Ongoing     Problem: Neurological  Goal: Maximum potential motor/sensory/cognitive function  Outcome: Ongoing

## 2019-07-21 VITALS
RESPIRATION RATE: 16 BRPM | WEIGHT: 107.14 LBS | TEMPERATURE: 98.7 F | OXYGEN SATURATION: 97 % | BODY MASS INDEX: 16.82 KG/M2 | SYSTOLIC BLOOD PRESSURE: 116 MMHG | HEART RATE: 81 BPM | HEIGHT: 67 IN | DIASTOLIC BLOOD PRESSURE: 75 MMHG

## 2019-07-21 PROCEDURE — 99239 HOSP IP/OBS DSCHRG MGMT >30: CPT | Performed by: FAMILY MEDICINE

## 2019-07-21 PROCEDURE — 6360000002 HC RX W HCPCS

## 2019-07-21 PROCEDURE — 6360000002 HC RX W HCPCS: Performed by: CLINICAL NURSE SPECIALIST

## 2019-07-21 PROCEDURE — 2500000003 HC RX 250 WO HCPCS: Performed by: CLINICAL NURSE SPECIALIST

## 2019-07-21 PROCEDURE — 6370000000 HC RX 637 (ALT 250 FOR IP): Performed by: CLINICAL NURSE SPECIALIST

## 2019-07-21 RX ORDER — HEPARIN SODIUM (PORCINE) LOCK FLUSH IV SOLN 100 UNIT/ML 100 UNIT/ML
500 SOLUTION INTRAVENOUS PRN
Status: DISCONTINUED | OUTPATIENT
Start: 2019-07-21 | End: 2019-07-21

## 2019-07-21 RX ORDER — HEPARIN SODIUM (PORCINE) LOCK FLUSH IV SOLN 100 UNIT/ML 100 UNIT/ML
300 SOLUTION INTRAVENOUS PRN
Status: DISCONTINUED | OUTPATIENT
Start: 2019-07-21 | End: 2019-07-21 | Stop reason: HOSPADM

## 2019-07-21 RX ORDER — PROMETHAZINE HYDROCHLORIDE 12.5 MG/1
25 TABLET ORAL EVERY 6 HOURS PRN
Qty: 90 TABLET | Refills: 0 | Status: SHIPPED | OUTPATIENT
Start: 2019-07-21 | End: 2019-12-12 | Stop reason: SDUPTHER

## 2019-07-21 RX ORDER — IBUPROFEN 400 MG/1
400 TABLET ORAL EVERY 6 HOURS PRN
Qty: 90 TABLET | Refills: 0 | Status: SHIPPED | OUTPATIENT
Start: 2019-07-21

## 2019-07-21 RX ADMIN — POTASSIUM CHLORIDE, DEXTROSE MONOHYDRATE AND SODIUM CHLORIDE: 150; 5; 450 INJECTION, SOLUTION INTRAVENOUS at 00:12

## 2019-07-21 RX ADMIN — KETOROLAC TROMETHAMINE 15 MG: 15 INJECTION, SOLUTION INTRAMUSCULAR; INTRAVENOUS at 08:12

## 2019-07-21 RX ADMIN — POTASSIUM CHLORIDE, DEXTROSE MONOHYDRATE AND SODIUM CHLORIDE: 150; 5; 450 INJECTION, SOLUTION INTRAVENOUS at 08:11

## 2019-07-21 RX ADMIN — KETOROLAC TROMETHAMINE 15 MG: 15 INJECTION, SOLUTION INTRAMUSCULAR; INTRAVENOUS at 03:03

## 2019-07-21 RX ADMIN — OXYCODONE HYDROCHLORIDE 5 MG: 5 TABLET ORAL at 03:03

## 2019-07-21 RX ADMIN — SODIUM CHLORIDE, PRESERVATIVE FREE 300 UNITS: 5 INJECTION INTRAVENOUS at 14:25

## 2019-07-21 RX ADMIN — HEPARIN SODIUM (PORCINE) LOCK FLUSH IV SOLN 100 UNIT/ML 300 UNITS: 100 SOLUTION at 14:25

## 2019-07-21 RX ADMIN — PANCRELIPASE 1 CAPSULE: 24000; 76000; 120000 CAPSULE, DELAYED RELEASE PELLETS ORAL at 08:11

## 2019-07-21 RX ADMIN — OXYCODONE HYDROCHLORIDE 5 MG: 5 TABLET ORAL at 09:04

## 2019-07-21 ASSESSMENT — PAIN DESCRIPTION - PROGRESSION: CLINICAL_PROGRESSION: GRADUALLY WORSENING

## 2019-07-21 ASSESSMENT — PAIN SCALES - GENERAL
PAINLEVEL_OUTOF10: 7
PAINLEVEL_OUTOF10: 0
PAINLEVEL_OUTOF10: 5
PAINLEVEL_OUTOF10: 2
PAINLEVEL_OUTOF10: 5
PAINLEVEL_OUTOF10: 0

## 2019-07-21 ASSESSMENT — PAIN DESCRIPTION - DESCRIPTORS
DESCRIPTORS: DISCOMFORT
DESCRIPTORS: DISCOMFORT

## 2019-07-21 ASSESSMENT — PAIN SCALES - WONG BAKER: WONGBAKER_NUMERICALRESPONSE: 0

## 2019-07-21 ASSESSMENT — PAIN DESCRIPTION - ONSET
ONSET: ON-GOING
ONSET: ON-GOING

## 2019-07-21 ASSESSMENT — PAIN DESCRIPTION - FREQUENCY
FREQUENCY: INTERMITTENT
FREQUENCY: INTERMITTENT

## 2019-07-21 ASSESSMENT — PAIN DESCRIPTION - PAIN TYPE
TYPE: CHRONIC PAIN

## 2019-07-21 ASSESSMENT — PAIN DESCRIPTION - LOCATION
LOCATION: ABDOMEN

## 2019-07-21 ASSESSMENT — PAIN DESCRIPTION - ORIENTATION: ORIENTATION: LEFT

## 2019-07-21 NOTE — DISCHARGE SUMMARY
lidocaine-prilocaine (EMLA) 2.5-2.5 % cream  Apply topically as needed. Apply a quarter size amount to port site 1 hour before chemotherapy appointment. Cover with plastic wrap.             lipase-protease-amylase (CREON) 14756-38599 units delayed release capsule  Take 1 capsule by mouth 3 times daily (with meals) for 7 days             lipase-protease-amylase (CREON) 78965-11603 units delayed release capsule  Take 1 capsule by mouth 3 times daily (with meals)             LORazepam (ATIVAN) 0.5 MG tablet  Take 1 tablet by mouth every 6 hours as needed. medical marijuana  Take 1 each by mouth as needed (pain control). Through [facility] dispens[facility] in [address] [facility] [address]             medical marijuana  Take 1 capsule by mouth 2 times daily. ondansetron (ZOFRAN-ODT) 8 MG TBDP disintegrating tablet  Take 1 tablet by mouth every 8 hours as needed for Nausea or Vomiting             oxyCODONE HCl (OXY-IR) 10 MG immediate release tablet  Take 10 mg by mouth every 4 hours as needed for Pain (taking 5 mg). prochlorperazine (COMPAZINE) 10 MG tablet  Take 1 tablet by mouth every 6 hours as needed for Nausea             promethazine (PHENERGAN) 12.5 MG tablet  Take 2 tablets by mouth every 6 hours as needed for Nausea             scopolamine (TRANSDERM-SCOP) transdermal patch  Place 1 patch onto the skin every 72 hours For nausea             senna-docusate (PERICOLACE) 8.6-50 MG per tablet  Take 1 tablet by mouth daily as needed                  Activity: activity as tolerated    Diet: MALLY    Time Spent on discharge is more than 30 minutes in the examination, evaluation, counseling and review of medications and discharge plan. Electronically signed by Anitra Lefort, MD on 7/21/2019 at 9:17 AM     Thank you Dr. Mary Jane Regan for the opportunity to be involved in this patient's care.

## 2019-07-21 NOTE — PROGRESS NOTES
Patient discharged to home in stable condition. She feels her nausea has significantly improved and has had no more vomiting. Port was de accessed with no problems. Heparin was flushed into port as ordered. All belongings and discharge instructions sent with patient. She was wheeled  to front entranced by staff to personal vehicle driven by .

## 2019-07-21 NOTE — PROGRESS NOTES
Progress Note    7/21/2019 9:15 AM  Subjective: Interval History: N/V/D all improved today. Ate breakfast    Diet: Dietary Nutrition Supplements: Clear Liquid Oral Supplement  DIET GENERAL;    Medications:   Reviewed medications    Labs:   CBC:   Recent Labs     07/19/19 0626   WBC 2.0*   HGB 9.8*        BMP:    Recent Labs     07/19/19 0626      K 3.6*      CO2 26   BUN 13   CREATININE 0.48*   GLUCOSE 105*     INR:   Recent Labs     07/19/19 0626   INR 1.1       Objective:   Vitals: /75   Pulse 81   Temp 98.7 °F (37.1 °C) (Oral)   Resp 16   Ht 5' 7\" (1.702 m)   Wt 107 lb 2.3 oz (48.6 kg)   LMP 09/07/2012   SpO2 97%   BMI 16.78 kg/m²   General appearance: alert and cooperative with exam  Neck: no adenopathy, no carotid bruit and thyroid not enlarged, symmetric, no tenderness/mass/nodules  Lungs: clear to auscultation bilaterally  Heart: regular rate and rhythm, S1, S2 normal, no murmur, click, rub or gallop  Abdomen: abnormal findings:  tenderness mild in the epigastrium soft, no guarding. Extremities: extremities normal, atraumatic, no cyanosis or edema  Neurologic: Mental status: Alert, oriented, thought content appropriate    Assessment and Plan:    CINV (chemotherapy-induced nausea and vomiting)    P: d/c home with med.  F/u with oncology and PCP    Patient Active Problem List:     Gastritis without bleeding     Pancreatic adenocarcinoma (HCC)     Anxiety     CINV (chemotherapy-induced nausea and vomiting)     Epigastric pain     Weakness     Abnormal loss of weight     Partial paralysis of both lower limbs (HCC)     Moderate protein-calorie malnutrition (HCC)     Spinal cord infarction (HCC)     Weakness of both lower extremities     Pressure ulcer of coccygeal region, stage 2     Nausea and vomiting     Moderate malnutrition ([address]      Electronically signed by Ingrid Love MD on 7/21/2019 at 9:15 AM

## 2019-07-22 ENCOUNTER — TELEPHONE (OUTPATIENT)
Dept: ONCOLOGY | Age: 59
End: 2019-07-22

## 2019-07-22 ENCOUNTER — HOSPITAL ENCOUNTER (OUTPATIENT)
Dept: INFUSION THERAPY | Age: 59
Discharge: HOME | DRG: 641 | End: 2019-07-22
Payer: COMMERCIAL

## 2019-07-22 ENCOUNTER — HOSPITAL ENCOUNTER (INPATIENT)
Age: 59
LOS: 2 days | Discharge: HOME | DRG: 641 | End: 2019-07-24
Attending: FAMILY MEDICINE | Admitting: FAMILY MEDICINE
Payer: COMMERCIAL

## 2019-07-22 VITALS
RESPIRATION RATE: 16 BRPM | WEIGHT: 103 LBS | DIASTOLIC BLOOD PRESSURE: 77 MMHG | TEMPERATURE: 98.6 F | BODY MASS INDEX: 16.13 KG/M2 | HEART RATE: 116 BPM | SYSTOLIC BLOOD PRESSURE: 116 MMHG

## 2019-07-22 DIAGNOSIS — K29.70 GASTRITIS WITHOUT BLEEDING, UNSPECIFIED CHRONICITY, UNSPECIFIED GASTRITIS TYPE: ICD-10-CM

## 2019-07-22 DIAGNOSIS — C25.9 PANCREATIC ADENOCARCINOMA (HCC): ICD-10-CM

## 2019-07-22 DIAGNOSIS — K52.1 DIARRHEA DUE TO DRUG: Primary | ICD-10-CM

## 2019-07-22 DIAGNOSIS — C25.1 MALIGNANT NEOPLASM OF BODY OF PANCREAS (HCC): Primary | ICD-10-CM

## 2019-07-22 PROBLEM — R11.2 CHEMOTHERAPY INDUCED NAUSEA AND VOMITING: Status: ACTIVE | Noted: 2019-07-22

## 2019-07-22 PROBLEM — T45.1X5A CHEMOTHERAPY INDUCED NAUSEA AND VOMITING: Status: ACTIVE | Noted: 2019-07-22

## 2019-07-22 LAB
ABSOLUTE EOS #: 0 K/UL (ref 0–0.4)
ABSOLUTE IMMATURE GRANULOCYTE: ABNORMAL K/UL (ref 0–0.3)
ABSOLUTE LYMPH #: 0.7 K/UL (ref 1–4.8)
ABSOLUTE MONO #: 0.1 K/UL (ref 0.1–1.2)
ALBUMIN SERPL-MCNC: 3.7 G/DL (ref 3.5–5.2)
ALBUMIN/GLOBULIN RATIO: 1.4 (ref 1–2.5)
ALP BLD-CCNC: 54 U/L (ref 35–104)
ALT SERPL-CCNC: 25 U/L (ref 5–33)
ANION GAP SERPL CALCULATED.3IONS-SCNC: 10 MMOL/L (ref 9–17)
AST SERPL-CCNC: 27 U/L
BASOPHILS # BLD: 1 % (ref 0–2)
BASOPHILS ABSOLUTE: 0 K/UL (ref 0–0.2)
BILIRUB SERPL-MCNC: 0.5 MG/DL (ref 0.3–1.2)
BUN BLDV-MCNC: 8 MG/DL (ref 6–20)
BUN/CREAT BLD: ABNORMAL (ref 9–20)
CALCIUM SERPL-MCNC: 9.5 MG/DL (ref 8.6–10.4)
CHLORIDE BLD-SCNC: 99 MMOL/L (ref 98–107)
CO2: 27 MMOL/L (ref 20–31)
CREAT SERPL-MCNC: 0.49 MG/DL (ref 0.5–0.9)
DIFFERENTIAL TYPE: ABNORMAL
EOSINOPHILS RELATIVE PERCENT: 2 % (ref 1–4)
GFR AFRICAN AMERICAN: >60 ML/MIN
GFR NON-AFRICAN AMERICAN: >60 ML/MIN
GFR SERPL CREATININE-BSD FRML MDRD: ABNORMAL ML/MIN/{1.73_M2}
GFR SERPL CREATININE-BSD FRML MDRD: ABNORMAL ML/MIN/{1.73_M2}
GLUCOSE BLD-MCNC: 113 MG/DL (ref 70–99)
HCT VFR BLD CALC: 32.9 % (ref 36–46)
HEMOGLOBIN: 11.1 G/DL (ref 12–16)
IMMATURE GRANULOCYTES: ABNORMAL %
LYMPHOCYTES # BLD: 36 % (ref 24–44)
MCH RBC QN AUTO: 28.8 PG (ref 26–34)
MCHC RBC AUTO-ENTMCNC: 33.7 G/DL (ref 31–37)
MCV RBC AUTO: 85.4 FL (ref 80–100)
MONOCYTES # BLD: 4 % (ref 2–11)
NRBC AUTOMATED: ABNORMAL PER 100 WBC
PDW BLD-RTO: 13.7 % (ref 12.5–15.4)
PLATELET # BLD: 144 K/UL (ref 140–450)
PLATELET ESTIMATE: ABNORMAL
PMV BLD AUTO: 8.4 FL (ref 6–12)
POTASSIUM SERPL-SCNC: 3.9 MMOL/L (ref 3.7–5.3)
RBC # BLD: 3.85 M/UL (ref 4–5.2)
RBC # BLD: ABNORMAL 10*6/UL
SEG NEUTROPHILS: 57 % (ref 36–66)
SEGMENTED NEUTROPHILS ABSOLUTE COUNT: 1.2 K/UL (ref 1.8–7.7)
SODIUM BLD-SCNC: 136 MMOL/L (ref 135–144)
TOTAL PROTEIN: 6.3 G/DL (ref 6.4–8.3)
WBC # BLD: 2 K/UL (ref 3.5–11)
WBC # BLD: ABNORMAL 10*3/UL

## 2019-07-22 PROCEDURE — 80053 COMPREHEN METABOLIC PANEL: CPT

## 2019-07-22 PROCEDURE — 2580000003 HC RX 258: Performed by: CLINICAL NURSE SPECIALIST

## 2019-07-22 PROCEDURE — 1200000000 HC SEMI PRIVATE

## 2019-07-22 PROCEDURE — 96361 HYDRATE IV INFUSION ADD-ON: CPT

## 2019-07-22 PROCEDURE — 85025 COMPLETE CBC W/AUTO DIFF WBC: CPT

## 2019-07-22 PROCEDURE — 6360000002 HC RX W HCPCS: Performed by: CLINICAL NURSE SPECIALIST

## 2019-07-22 PROCEDURE — 6370000000 HC RX 637 (ALT 250 FOR IP): Performed by: CLINICAL NURSE SPECIALIST

## 2019-07-22 PROCEDURE — 2580000003 HC RX 258: Performed by: INTERNAL MEDICINE

## 2019-07-22 PROCEDURE — 99222 1ST HOSP IP/OBS MODERATE 55: CPT | Performed by: CLINICAL NURSE SPECIALIST

## 2019-07-22 PROCEDURE — 96375 TX/PRO/DX INJ NEW DRUG ADDON: CPT

## 2019-07-22 PROCEDURE — 6370000000 HC RX 637 (ALT 250 FOR IP): Performed by: NURSE PRACTITIONER

## 2019-07-22 PROCEDURE — 6360000002 HC RX W HCPCS: Performed by: INTERNAL MEDICINE

## 2019-07-22 PROCEDURE — C9113 INJ PANTOPRAZOLE SODIUM, VIA: HCPCS | Performed by: CLINICAL NURSE SPECIALIST

## 2019-07-22 PROCEDURE — 96360 HYDRATION IV INFUSION INIT: CPT

## 2019-07-22 PROCEDURE — 36591 DRAW BLOOD OFF VENOUS DEVICE: CPT

## 2019-07-22 RX ORDER — SODIUM CHLORIDE 9 MG/ML
INJECTION, SOLUTION INTRAVENOUS CONTINUOUS
Status: DISCONTINUED | OUTPATIENT
Start: 2019-07-22 | End: 2019-07-24 | Stop reason: HOSPADM

## 2019-07-22 RX ORDER — ASPIRIN 81 MG/1
81 TABLET, CHEWABLE ORAL DAILY
Status: DISCONTINUED | OUTPATIENT
Start: 2019-07-22 | End: 2019-07-24 | Stop reason: HOSPADM

## 2019-07-22 RX ORDER — HEPARIN SODIUM (PORCINE) LOCK FLUSH IV SOLN 100 UNIT/ML 100 UNIT/ML
500 SOLUTION INTRAVENOUS PRN
Status: CANCELLED | OUTPATIENT
Start: 2019-07-22

## 2019-07-22 RX ORDER — ACETAMINOPHEN 325 MG/1
650 TABLET ORAL EVERY 4 HOURS PRN
Status: DISCONTINUED | OUTPATIENT
Start: 2019-07-22 | End: 2019-07-24 | Stop reason: HOSPADM

## 2019-07-22 RX ORDER — 0.9 % SODIUM CHLORIDE 0.9 %
10 VIAL (ML) INJECTION DAILY
Status: DISCONTINUED | OUTPATIENT
Start: 2019-07-22 | End: 2019-07-24 | Stop reason: HOSPADM

## 2019-07-22 RX ORDER — LORAZEPAM 0.5 MG/1
0.5 TABLET ORAL EVERY 6 HOURS PRN
Status: DISCONTINUED | OUTPATIENT
Start: 2019-07-22 | End: 2019-07-24 | Stop reason: HOSPADM

## 2019-07-22 RX ORDER — LORAZEPAM 2 MG/ML
0.5 INJECTION INTRAMUSCULAR ONCE
Status: COMPLETED | OUTPATIENT
Start: 2019-07-22 | End: 2019-07-22

## 2019-07-22 RX ORDER — NICOTINE 21 MG/24HR
1 PATCH, TRANSDERMAL 24 HOURS TRANSDERMAL DAILY PRN
Status: DISCONTINUED | OUTPATIENT
Start: 2019-07-22 | End: 2019-07-22

## 2019-07-22 RX ORDER — SODIUM CHLORIDE 0.9 % (FLUSH) 0.9 %
10 SYRINGE (ML) INJECTION PRN
Status: DISCONTINUED | OUTPATIENT
Start: 2019-07-22 | End: 2019-07-24 | Stop reason: HOSPADM

## 2019-07-22 RX ORDER — POTASSIUM CHLORIDE 7.45 MG/ML
10 INJECTION INTRAVENOUS PRN
Status: DISCONTINUED | OUTPATIENT
Start: 2019-07-22 | End: 2019-07-24 | Stop reason: HOSPADM

## 2019-07-22 RX ORDER — OXYCODONE HYDROCHLORIDE 5 MG/1
10 TABLET ORAL EVERY 4 HOURS PRN
Status: DISCONTINUED | OUTPATIENT
Start: 2019-07-22 | End: 2019-07-22

## 2019-07-22 RX ORDER — PROCHLORPERAZINE MALEATE 5 MG/1
10 TABLET ORAL EVERY 6 HOURS PRN
Status: DISCONTINUED | OUTPATIENT
Start: 2019-07-22 | End: 2019-07-24 | Stop reason: HOSPADM

## 2019-07-22 RX ORDER — SCOLOPAMINE TRANSDERMAL SYSTEM 1 MG/1
1 PATCH, EXTENDED RELEASE TRANSDERMAL
Status: DISCONTINUED | OUTPATIENT
Start: 2019-07-22 | End: 2019-07-24 | Stop reason: HOSPADM

## 2019-07-22 RX ORDER — SODIUM CHLORIDE 0.9 % (FLUSH) 0.9 %
10 SYRINGE (ML) INJECTION PRN
Status: CANCELLED | OUTPATIENT
Start: 2019-07-22

## 2019-07-22 RX ORDER — POTASSIUM CHLORIDE 20 MEQ/1
40 TABLET, EXTENDED RELEASE ORAL PRN
Status: DISCONTINUED | OUTPATIENT
Start: 2019-07-22 | End: 2019-07-24 | Stop reason: HOSPADM

## 2019-07-22 RX ORDER — OXYCODONE HYDROCHLORIDE 5 MG/1
5 TABLET ORAL EVERY 4 HOURS PRN
Status: DISCONTINUED | OUTPATIENT
Start: 2019-07-22 | End: 2019-07-22

## 2019-07-22 RX ORDER — 0.9 % SODIUM CHLORIDE 0.9 %
1000 INTRAVENOUS SOLUTION INTRAVENOUS ONCE
Status: COMPLETED | OUTPATIENT
Start: 2019-07-22 | End: 2019-07-22

## 2019-07-22 RX ORDER — PANTOPRAZOLE SODIUM 40 MG/10ML
40 INJECTION, POWDER, LYOPHILIZED, FOR SOLUTION INTRAVENOUS 2 TIMES DAILY
Status: DISCONTINUED | OUTPATIENT
Start: 2019-07-22 | End: 2019-07-24 | Stop reason: HOSPADM

## 2019-07-22 RX ORDER — PROMETHAZINE HYDROCHLORIDE 25 MG/ML
12.5 INJECTION, SOLUTION INTRAMUSCULAR; INTRAVENOUS EVERY 6 HOURS PRN
Status: DISCONTINUED | OUTPATIENT
Start: 2019-07-22 | End: 2019-07-24 | Stop reason: HOSPADM

## 2019-07-22 RX ORDER — SODIUM CHLORIDE 0.9 % (FLUSH) 0.9 %
10 SYRINGE (ML) INJECTION EVERY 12 HOURS SCHEDULED
Status: DISCONTINUED | OUTPATIENT
Start: 2019-07-22 | End: 2019-07-24 | Stop reason: HOSPADM

## 2019-07-22 RX ORDER — DIPHENOXYLATE HYDROCHLORIDE AND ATROPINE SULFATE 2.5; .025 MG/1; MG/1
1 TABLET ORAL 4 TIMES DAILY PRN
Status: DISCONTINUED | OUTPATIENT
Start: 2019-07-22 | End: 2019-07-24 | Stop reason: HOSPADM

## 2019-07-22 RX ORDER — OXYCODONE HYDROCHLORIDE 5 MG/1
5 TABLET ORAL EVERY 4 HOURS PRN
Status: DISCONTINUED | OUTPATIENT
Start: 2019-07-22 | End: 2019-07-24 | Stop reason: HOSPADM

## 2019-07-22 RX ORDER — FENTANYL 25 UG/H
1 PATCH TRANSDERMAL
Status: DISCONTINUED | OUTPATIENT
Start: 2019-07-22 | End: 2019-07-22

## 2019-07-22 RX ORDER — MAGNESIUM SULFATE 1 G/100ML
1 INJECTION INTRAVENOUS PRN
Status: DISCONTINUED | OUTPATIENT
Start: 2019-07-22 | End: 2019-07-24 | Stop reason: HOSPADM

## 2019-07-22 RX ORDER — ONDANSETRON 2 MG/ML
4 INJECTION INTRAMUSCULAR; INTRAVENOUS EVERY 6 HOURS PRN
Status: DISCONTINUED | OUTPATIENT
Start: 2019-07-22 | End: 2019-07-24 | Stop reason: HOSPADM

## 2019-07-22 RX ORDER — ATROPINE SULFATE 0.4 MG/ML
0.4 AMPUL (ML) INJECTION ONCE
Status: COMPLETED | OUTPATIENT
Start: 2019-07-22 | End: 2019-07-22

## 2019-07-22 RX ORDER — OLANZAPINE 2.5 MG/1
2.5 TABLET ORAL NIGHTLY
Status: DISCONTINUED | OUTPATIENT
Start: 2019-07-22 | End: 2019-07-24 | Stop reason: HOSPADM

## 2019-07-22 RX ORDER — SODIUM CHLORIDE 0.9 % (FLUSH) 0.9 %
10 SYRINGE (ML) INJECTION PRN
Status: DISCONTINUED | OUTPATIENT
Start: 2019-07-22 | End: 2019-07-23 | Stop reason: HOSPADM

## 2019-07-22 RX ORDER — SODIUM CHLORIDE 0.9 % (FLUSH) 0.9 %
20 SYRINGE (ML) INJECTION PRN
Status: CANCELLED | OUTPATIENT
Start: 2019-07-22

## 2019-07-22 RX ORDER — ONDANSETRON 4 MG/1
8 TABLET, ORALLY DISINTEGRATING ORAL EVERY 8 HOURS PRN
Status: DISCONTINUED | OUTPATIENT
Start: 2019-07-22 | End: 2019-07-24 | Stop reason: HOSPADM

## 2019-07-22 RX ORDER — FENTANYL 25 UG/H
1 PATCH TRANSDERMAL
Status: DISCONTINUED | OUTPATIENT
Start: 2019-07-23 | End: 2019-07-24 | Stop reason: HOSPADM

## 2019-07-22 RX ORDER — PROMETHAZINE HYDROCHLORIDE 25 MG/1
25 TABLET ORAL EVERY 6 HOURS PRN
Status: DISCONTINUED | OUTPATIENT
Start: 2019-07-22 | End: 2019-07-24 | Stop reason: HOSPADM

## 2019-07-22 RX ORDER — SENNA AND DOCUSATE SODIUM 50; 8.6 MG/1; MG/1
1 TABLET, FILM COATED ORAL NIGHTLY PRN
Status: DISCONTINUED | OUTPATIENT
Start: 2019-07-22 | End: 2019-07-24 | Stop reason: HOSPADM

## 2019-07-22 RX ORDER — HEPARIN SODIUM (PORCINE) LOCK FLUSH IV SOLN 100 UNIT/ML 100 UNIT/ML
500 SOLUTION INTRAVENOUS PRN
Status: DISCONTINUED | OUTPATIENT
Start: 2019-07-22 | End: 2019-07-23 | Stop reason: HOSPADM

## 2019-07-22 RX ORDER — ONDANSETRON 2 MG/ML
8 INJECTION INTRAMUSCULAR; INTRAVENOUS ONCE
Status: COMPLETED | OUTPATIENT
Start: 2019-07-22 | End: 2019-07-22

## 2019-07-22 RX ADMIN — OLANZAPINE 2.5 MG: 2.5 TABLET, FILM COATED ORAL at 21:48

## 2019-07-22 RX ADMIN — ATROPINE SULFATE 0.4 MG: 0.4 INJECTION, SOLUTION INTRAMUSCULAR; INTRAVENOUS; SUBCUTANEOUS at 15:33

## 2019-07-22 RX ADMIN — OXYCODONE HYDROCHLORIDE 5 MG: 5 TABLET ORAL at 22:05

## 2019-07-22 RX ADMIN — ONDANSETRON 8 MG: 2 INJECTION INTRAMUSCULAR; INTRAVENOUS at 15:33

## 2019-07-22 RX ADMIN — Medication 10 ML: at 21:49

## 2019-07-22 RX ADMIN — Medication 10 ML: at 14:36

## 2019-07-22 RX ADMIN — SODIUM CHLORIDE 10 ML: 9 INJECTION, SOLUTION INTRAMUSCULAR; INTRAVENOUS; SUBCUTANEOUS at 21:48

## 2019-07-22 RX ADMIN — LORAZEPAM 0.5 MG: 2 INJECTION INTRAMUSCULAR; INTRAVENOUS at 15:34

## 2019-07-22 RX ADMIN — ENOXAPARIN SODIUM 40 MG: 40 INJECTION SUBCUTANEOUS at 21:48

## 2019-07-22 RX ADMIN — SODIUM CHLORIDE: 9 INJECTION, SOLUTION INTRAVENOUS at 17:27

## 2019-07-22 RX ADMIN — PANTOPRAZOLE SODIUM 40 MG: 40 INJECTION, POWDER, FOR SOLUTION INTRAVENOUS at 21:47

## 2019-07-22 RX ADMIN — HYDROMORPHONE HYDROCHLORIDE 1 MG: 1 INJECTION, SOLUTION INTRAMUSCULAR; INTRAVENOUS; SUBCUTANEOUS at 17:27

## 2019-07-22 RX ADMIN — SODIUM CHLORIDE 1000 ML: 9 INJECTION, SOLUTION INTRAVENOUS at 14:36

## 2019-07-22 ASSESSMENT — PAIN DESCRIPTION - LOCATION
LOCATION: ABDOMEN

## 2019-07-22 ASSESSMENT — PAIN DESCRIPTION - PROGRESSION
CLINICAL_PROGRESSION: NOT CHANGED
CLINICAL_PROGRESSION: NOT CHANGED

## 2019-07-22 ASSESSMENT — PAIN DESCRIPTION - ONSET
ONSET: ON-GOING
ONSET: ON-GOING

## 2019-07-22 ASSESSMENT — PAIN SCALES - GENERAL
PAINLEVEL_OUTOF10: 3
PAINLEVEL_OUTOF10: 0
PAINLEVEL_OUTOF10: 3
PAINLEVEL_OUTOF10: 7
PAINLEVEL_OUTOF10: 7

## 2019-07-22 ASSESSMENT — ENCOUNTER SYMPTOMS
TROUBLE SWALLOWING: 0
VOMITING: 1
ABDOMINAL PAIN: 1
BLOOD IN STOOL: 0
DIARRHEA: 1
NAUSEA: 1
SORE THROAT: 0
ALLERGIC/IMMUNOLOGIC NEGATIVE: 1
EYES NEGATIVE: 1
RESPIRATORY NEGATIVE: 1

## 2019-07-22 ASSESSMENT — PAIN DESCRIPTION - ORIENTATION
ORIENTATION: MID;LEFT
ORIENTATION: LEFT;LOWER;MID

## 2019-07-22 ASSESSMENT — PAIN DESCRIPTION - PAIN TYPE
TYPE: CHRONIC PAIN

## 2019-07-22 ASSESSMENT — PAIN DESCRIPTION - DESCRIPTORS
DESCRIPTORS: DISCOMFORT
DESCRIPTORS: DISCOMFORT

## 2019-07-22 ASSESSMENT — PAIN - FUNCTIONAL ASSESSMENT
PAIN_FUNCTIONAL_ASSESSMENT: ACTIVITIES ARE NOT PREVENTED
PAIN_FUNCTIONAL_ASSESSMENT: ACTIVITIES ARE NOT PREVENTED

## 2019-07-22 ASSESSMENT — PAIN DESCRIPTION - FREQUENCY
FREQUENCY: CONTINUOUS
FREQUENCY: CONTINUOUS

## 2019-07-22 NOTE — PROGRESS NOTES
Pt here for c/o vomiting x 2 and diarrhea x 2 since d/c from the hospital yesterday. Pt reports not only eating very small amt and unable to keep food down. Wt down to 103lb today. Pt states is using Zofran and Ativan around the clock for nausea. She also had a scopolamine patch on but that was due to be taken off today and they have not picked up a refill to apply a new patch. Dr. Li [facility] notified of pt symptoms. Order rec'd to give 1L NS bolus, Ativan, Zofran and Atropine IV x 1 and readmit under same service that d/c'd pt over the weekend. Spoke with direct access and report given to ADI Davey CNP. Pt to go to  #[phone], report called to [facility] and pt transferred via w/c with  at side.

## 2019-07-23 ENCOUNTER — TELEPHONE (OUTPATIENT)
Dept: ONCOLOGY | Age: 59
End: 2019-07-23

## 2019-07-23 ENCOUNTER — HOSPITAL ENCOUNTER (OUTPATIENT)
Age: 59
Discharge: HOME | DRG: 641 | End: 2019-07-23
Payer: COMMERCIAL

## 2019-07-23 DIAGNOSIS — C25.1 MALIGNANT NEOPLASM OF BODY OF PANCREAS (HCC): ICD-10-CM

## 2019-07-23 DIAGNOSIS — C25.1 MALIGNANT NEOPLASM OF BODY OF PANCREAS (HCC): Primary | ICD-10-CM

## 2019-07-23 PROBLEM — D72.819 LEUKOPENIA: Status: ACTIVE | Noted: 2019-07-23

## 2019-07-23 LAB
ABSOLUTE EOS #: 0.04 K/UL (ref 0–0.4)
ABSOLUTE IMMATURE GRANULOCYTE: ABNORMAL K/UL (ref 0–0.3)
ABSOLUTE LYMPH #: 0.87 K/UL (ref 1–4.8)
ABSOLUTE MONO #: 0.04 K/UL (ref 0.1–0.8)
ANION GAP SERPL CALCULATED.3IONS-SCNC: 9 MMOL/L (ref 9–17)
BASOPHILS # BLD: 0 % (ref 0–2)
BASOPHILS ABSOLUTE: 0 K/UL (ref 0–0.2)
BUN BLDV-MCNC: 6 MG/DL (ref 6–20)
BUN/CREAT BLD: ABNORMAL (ref 9–20)
CALCIUM SERPL-MCNC: 9.1 MG/DL (ref 8.6–10.4)
CHLORIDE BLD-SCNC: 105 MMOL/L (ref 98–107)
CO2: 27 MMOL/L (ref 20–31)
CREAT SERPL-MCNC: 0.62 MG/DL (ref 0.5–0.9)
DIFFERENTIAL TYPE: ABNORMAL
EOSINOPHILS RELATIVE PERCENT: 3 % (ref 1–4)
GFR AFRICAN AMERICAN: >60 ML/MIN
GFR NON-AFRICAN AMERICAN: >60 ML/MIN
GFR SERPL CREATININE-BSD FRML MDRD: ABNORMAL ML/MIN/{1.73_M2}
GFR SERPL CREATININE-BSD FRML MDRD: ABNORMAL ML/MIN/{1.73_M2}
GLUCOSE BLD-MCNC: 98 MG/DL (ref 70–99)
HCT VFR BLD CALC: 30.1 % (ref 36–46)
HEMOGLOBIN: 10.1 G/DL (ref 12–16)
IMMATURE GRANULOCYTES: ABNORMAL %
LYMPHOCYTES # BLD: 62 % (ref 24–44)
MCH RBC QN AUTO: 29 PG (ref 26–34)
MCHC RBC AUTO-ENTMCNC: 33.5 G/DL (ref 31–37)
MCV RBC AUTO: 86.6 FL (ref 80–100)
MONOCYTES # BLD: 3 % (ref 1–7)
MORPHOLOGY: NORMAL
NRBC AUTOMATED: ABNORMAL PER 100 WBC
PDW BLD-RTO: 14.1 % (ref 12.5–15.4)
PLATELET # BLD: 107 K/UL (ref 140–450)
PLATELET ESTIMATE: ABNORMAL
PMV BLD AUTO: 8.9 FL (ref 6–12)
POTASSIUM SERPL-SCNC: 3.6 MMOL/L (ref 3.7–5.3)
RBC # BLD: 3.48 M/UL (ref 4–5.2)
RBC # BLD: ABNORMAL 10*6/UL
SEG NEUTROPHILS: 32 % (ref 36–66)
SEGMENTED NEUTROPHILS ABSOLUTE COUNT: 0.45 K/UL (ref 1.8–7.7)
SODIUM BLD-SCNC: 141 MMOL/L (ref 135–144)
WBC # BLD: 1.4 K/UL (ref 3.5–11)
WBC # BLD: ABNORMAL 10*3/UL

## 2019-07-23 PROCEDURE — 85027 COMPLETE CBC AUTOMATED: CPT

## 2019-07-23 PROCEDURE — 6360000002 HC RX W HCPCS: Performed by: CLINICAL NURSE SPECIALIST

## 2019-07-23 PROCEDURE — 6370000000 HC RX 637 (ALT 250 FOR IP): Performed by: CLINICAL NURSE SPECIALIST

## 2019-07-23 PROCEDURE — 99233 SBSQ HOSP IP/OBS HIGH 50: CPT | Performed by: INTERNAL MEDICINE

## 2019-07-23 PROCEDURE — 6370000000 HC RX 637 (ALT 250 FOR IP): Performed by: NURSE PRACTITIONER

## 2019-07-23 PROCEDURE — 80048 BASIC METABOLIC PNL TOTAL CA: CPT

## 2019-07-23 PROCEDURE — 6370000000 HC RX 637 (ALT 250 FOR IP): Performed by: INTERNAL MEDICINE

## 2019-07-23 PROCEDURE — 6360000002 HC RX W HCPCS: Performed by: INTERNAL MEDICINE

## 2019-07-23 PROCEDURE — 2580000003 HC RX 258: Performed by: CLINICAL NURSE SPECIALIST

## 2019-07-23 PROCEDURE — 1200000000 HC SEMI PRIVATE

## 2019-07-23 PROCEDURE — C9113 INJ PANTOPRAZOLE SODIUM, VIA: HCPCS | Performed by: CLINICAL NURSE SPECIALIST

## 2019-07-23 PROCEDURE — 36415 COLL VENOUS BLD VENIPUNCTURE: CPT

## 2019-07-23 PROCEDURE — 99233 SBSQ HOSP IP/OBS HIGH 50: CPT | Performed by: FAMILY MEDICINE

## 2019-07-23 RX ORDER — OCTREOTIDE ACETATE 100 UG/ML
100 INJECTION, SOLUTION INTRAVENOUS; SUBCUTANEOUS EVERY 12 HOURS
Status: DISCONTINUED | OUTPATIENT
Start: 2019-07-23 | End: 2019-07-23

## 2019-07-23 RX ORDER — OCTREOTIDE ACETATE 100 UG/ML
100 INJECTION, SOLUTION INTRAVENOUS; SUBCUTANEOUS EVERY 12 HOURS
Status: DISCONTINUED | OUTPATIENT
Start: 2019-07-24 | End: 2019-07-24 | Stop reason: HOSPADM

## 2019-07-23 RX ADMIN — DIPHENOXYLATE HYDROCHLORIDE AND ATROPINE SULFATE 1 TABLET: 2.5; .025 TABLET ORAL at 10:19

## 2019-07-23 RX ADMIN — OCTREOTIDE ACETATE 100 MCG: 100 INJECTION, SOLUTION INTRAVENOUS; SUBCUTANEOUS at 14:42

## 2019-07-23 RX ADMIN — PANCRELIPASE 1 CAPSULE: 24000; 76000; 120000 CAPSULE, DELAYED RELEASE PELLETS ORAL at 17:46

## 2019-07-23 RX ADMIN — ASPIRIN 81 MG 81 MG: 81 TABLET ORAL at 10:26

## 2019-07-23 RX ADMIN — TBO-FILGRASTIM 300 MCG: 300 INJECTION, SOLUTION SUBCUTANEOUS at 18:35

## 2019-07-23 RX ADMIN — ENOXAPARIN SODIUM 40 MG: 40 INJECTION SUBCUTANEOUS at 10:18

## 2019-07-23 RX ADMIN — SODIUM CHLORIDE: 9 INJECTION, SOLUTION INTRAVENOUS at 06:13

## 2019-07-23 RX ADMIN — SODIUM CHLORIDE: 9 INJECTION, SOLUTION INTRAVENOUS at 19:21

## 2019-07-23 RX ADMIN — PANTOPRAZOLE SODIUM 40 MG: 40 INJECTION, POWDER, FOR SOLUTION INTRAVENOUS at 10:19

## 2019-07-23 RX ADMIN — PANTOPRAZOLE SODIUM 40 MG: 40 INJECTION, POWDER, FOR SOLUTION INTRAVENOUS at 20:52

## 2019-07-23 RX ADMIN — OLANZAPINE 2.5 MG: 2.5 TABLET, FILM COATED ORAL at 20:52

## 2019-07-23 RX ADMIN — OXYCODONE HYDROCHLORIDE 5 MG: 5 TABLET ORAL at 20:52

## 2019-07-23 RX ADMIN — OXYCODONE HYDROCHLORIDE 5 MG: 5 TABLET ORAL at 14:47

## 2019-07-23 RX ADMIN — OXYCODONE HYDROCHLORIDE 5 MG: 5 TABLET ORAL at 10:30

## 2019-07-23 RX ADMIN — POTASSIUM CHLORIDE 10 MEQ: 7.46 INJECTION, SOLUTION INTRAVENOUS at 10:18

## 2019-07-23 RX ADMIN — OXYCODONE HYDROCHLORIDE 5 MG: 5 TABLET ORAL at 06:13

## 2019-07-23 ASSESSMENT — PAIN DESCRIPTION - ORIENTATION: ORIENTATION: LEFT;LOWER;MID

## 2019-07-23 ASSESSMENT — ENCOUNTER SYMPTOMS
NAUSEA: 1
DIARRHEA: 1
VOMITING: 1

## 2019-07-23 ASSESSMENT — PAIN DESCRIPTION - LOCATION
LOCATION: ABDOMEN
LOCATION: ABDOMEN

## 2019-07-23 ASSESSMENT — PAIN DESCRIPTION - PROGRESSION: CLINICAL_PROGRESSION: NOT CHANGED

## 2019-07-23 ASSESSMENT — PAIN DESCRIPTION - ONSET: ONSET: ON-GOING

## 2019-07-23 ASSESSMENT — PAIN SCALES - GENERAL
PAINLEVEL_OUTOF10: 6
PAINLEVEL_OUTOF10: 3
PAINLEVEL_OUTOF10: 5
PAINLEVEL_OUTOF10: 7
PAINLEVEL_OUTOF10: 6
PAINLEVEL_OUTOF10: 6
PAINLEVEL_OUTOF10: 4

## 2019-07-23 ASSESSMENT — PAIN DESCRIPTION - DESCRIPTORS
DESCRIPTORS: ACHING;CRAMPING
DESCRIPTORS: ACHING;CRAMPING

## 2019-07-23 ASSESSMENT — PAIN DESCRIPTION - PAIN TYPE
TYPE: CHRONIC PAIN
TYPE: CHRONIC PAIN

## 2019-07-23 ASSESSMENT — PAIN - FUNCTIONAL ASSESSMENT: PAIN_FUNCTIONAL_ASSESSMENT: ACTIVITIES ARE NOT PREVENTED

## 2019-07-23 ASSESSMENT — PAIN DESCRIPTION - FREQUENCY
FREQUENCY: CONTINUOUS
FREQUENCY: CONTINUOUS

## 2019-07-24 VITALS
HEIGHT: 67 IN | RESPIRATION RATE: 16 BRPM | HEART RATE: 71 BPM | DIASTOLIC BLOOD PRESSURE: 74 MMHG | OXYGEN SATURATION: 95 % | TEMPERATURE: 98.1 F | SYSTOLIC BLOOD PRESSURE: 113 MMHG | WEIGHT: 106.92 LBS | BODY MASS INDEX: 16.78 KG/M2

## 2019-07-24 LAB
ABSOLUTE EOS #: 0.03 K/UL (ref 0–0.4)
ABSOLUTE IMMATURE GRANULOCYTE: ABNORMAL K/UL (ref 0–0.3)
ABSOLUTE LYMPH #: 1.09 K/UL (ref 1–4.8)
ABSOLUTE MONO #: 0.13 K/UL (ref 0.1–0.8)
ANION GAP SERPL CALCULATED.3IONS-SCNC: 4 MMOL/L (ref 9–17)
BASOPHILS # BLD: 0 % (ref 0–2)
BASOPHILS ABSOLUTE: 0 K/UL (ref 0–0.2)
BUN BLDV-MCNC: 6 MG/DL (ref 6–20)
BUN/CREAT BLD: ABNORMAL (ref 9–20)
CALCIUM SERPL-MCNC: 9.1 MG/DL (ref 8.6–10.4)
CHLORIDE BLD-SCNC: 106 MMOL/L (ref 98–107)
CO2: 30 MMOL/L (ref 20–31)
CREAT SERPL-MCNC: 0.66 MG/DL (ref 0.5–0.9)
DIFFERENTIAL TYPE: ABNORMAL
EOSINOPHILS RELATIVE PERCENT: 1 % (ref 1–4)
GFR AFRICAN AMERICAN: >60 ML/MIN
GFR NON-AFRICAN AMERICAN: >60 ML/MIN
GFR SERPL CREATININE-BSD FRML MDRD: ABNORMAL ML/MIN/{1.73_M2}
GFR SERPL CREATININE-BSD FRML MDRD: ABNORMAL ML/MIN/{1.73_M2}
GLUCOSE BLD-MCNC: 120 MG/DL (ref 70–99)
HCT VFR BLD CALC: 32.3 % (ref 36–46)
HEMOGLOBIN: 10.8 G/DL (ref 12–16)
IMMATURE GRANULOCYTES: ABNORMAL %
LYMPHOCYTES # BLD: 34 % (ref 24–44)
MCH RBC QN AUTO: 28.8 PG (ref 26–34)
MCHC RBC AUTO-ENTMCNC: 33.4 G/DL (ref 31–37)
MCV RBC AUTO: 86.2 FL (ref 80–100)
MONOCYTES # BLD: 4 % (ref 1–7)
MORPHOLOGY: NORMAL
NRBC AUTOMATED: ABNORMAL PER 100 WBC
PDW BLD-RTO: 13.6 % (ref 12.5–15.4)
PLATELET # BLD: 117 K/UL (ref 140–450)
PLATELET ESTIMATE: ABNORMAL
PMV BLD AUTO: 8.3 FL (ref 6–12)
POTASSIUM SERPL-SCNC: 3.8 MMOL/L (ref 3.7–5.3)
RBC # BLD: 3.75 M/UL (ref 4–5.2)
RBC # BLD: ABNORMAL 10*6/UL
SEG NEUTROPHILS: 61 % (ref 36–66)
SEGMENTED NEUTROPHILS ABSOLUTE COUNT: 1.95 K/UL (ref 1.8–7.7)
SEND OUT REPORT: NORMAL
SODIUM BLD-SCNC: 140 MMOL/L (ref 135–144)
TEST NAME: NORMAL
WBC # BLD: 3.2 K/UL (ref 3.5–11)
WBC # BLD: ABNORMAL 10*3/UL

## 2019-07-24 PROCEDURE — 85025 COMPLETE CBC W/AUTO DIFF WBC: CPT

## 2019-07-24 PROCEDURE — 36415 COLL VENOUS BLD VENIPUNCTURE: CPT

## 2019-07-24 PROCEDURE — 99232 SBSQ HOSP IP/OBS MODERATE 35: CPT | Performed by: INTERNAL MEDICINE

## 2019-07-24 PROCEDURE — 2580000003 HC RX 258: Performed by: CLINICAL NURSE SPECIALIST

## 2019-07-24 PROCEDURE — 6370000000 HC RX 637 (ALT 250 FOR IP): Performed by: CLINICAL NURSE SPECIALIST

## 2019-07-24 PROCEDURE — 99233 SBSQ HOSP IP/OBS HIGH 50: CPT | Performed by: FAMILY MEDICINE

## 2019-07-24 PROCEDURE — 6370000000 HC RX 637 (ALT 250 FOR IP): Performed by: INTERNAL MEDICINE

## 2019-07-24 PROCEDURE — 6370000000 HC RX 637 (ALT 250 FOR IP): Performed by: NURSE PRACTITIONER

## 2019-07-24 PROCEDURE — 6360000002 HC RX W HCPCS: Performed by: CLINICAL NURSE SPECIALIST

## 2019-07-24 PROCEDURE — 80048 BASIC METABOLIC PNL TOTAL CA: CPT

## 2019-07-24 PROCEDURE — 97116 GAIT TRAINING THERAPY: CPT

## 2019-07-24 PROCEDURE — C9113 INJ PANTOPRAZOLE SODIUM, VIA: HCPCS | Performed by: CLINICAL NURSE SPECIALIST

## 2019-07-24 PROCEDURE — 97166 OT EVAL MOD COMPLEX 45 MIN: CPT

## 2019-07-24 PROCEDURE — 97162 PT EVAL MOD COMPLEX 30 MIN: CPT

## 2019-07-24 PROCEDURE — 97535 SELF CARE MNGMENT TRAINING: CPT

## 2019-07-24 RX ORDER — HEPARIN SODIUM (PORCINE) LOCK FLUSH IV SOLN 100 UNIT/ML 100 UNIT/ML
100 SOLUTION INTRAVENOUS PRN
Status: DISCONTINUED | OUTPATIENT
Start: 2019-07-24 | End: 2019-07-24 | Stop reason: HOSPADM

## 2019-07-24 RX ORDER — DIPHENOXYLATE HYDROCHLORIDE AND ATROPINE SULFATE 2.5; .025 MG/1; MG/1
1 TABLET ORAL 4 TIMES DAILY PRN
COMMUNITY
Start: 2019-07-24 | End: 2019-08-03

## 2019-07-24 RX ORDER — FENTANYL 25 UG/H
1 PATCH TRANSDERMAL
Qty: 5 PATCH | Refills: 0 | Status: SHIPPED | OUTPATIENT
Start: 2019-07-26 | End: 2019-08-13 | Stop reason: ALTCHOICE

## 2019-07-24 RX ORDER — OLANZAPINE 2.5 MG/1
2.5 TABLET ORAL NIGHTLY
Qty: 30 TABLET | Refills: 0 | Status: SHIPPED | OUTPATIENT
Start: 2019-07-24 | End: 2019-08-16 | Stop reason: DRUGHIGH

## 2019-07-24 RX ADMIN — OXYCODONE HYDROCHLORIDE 5 MG: 5 TABLET ORAL at 07:41

## 2019-07-24 RX ADMIN — SODIUM CHLORIDE 10 ML: 9 INJECTION, SOLUTION INTRAMUSCULAR; INTRAVENOUS; SUBCUTANEOUS at 09:12

## 2019-07-24 RX ADMIN — PANCRELIPASE 1 CAPSULE: 24000; 76000; 120000 CAPSULE, DELAYED RELEASE PELLETS ORAL at 12:18

## 2019-07-24 RX ADMIN — OXYCODONE HYDROCHLORIDE 5 MG: 5 TABLET ORAL at 16:45

## 2019-07-24 RX ADMIN — PANTOPRAZOLE SODIUM 40 MG: 40 INJECTION, POWDER, FOR SOLUTION INTRAVENOUS at 09:12

## 2019-07-24 RX ADMIN — ENOXAPARIN SODIUM 40 MG: 40 INJECTION SUBCUTANEOUS at 09:12

## 2019-07-24 RX ADMIN — PANCRELIPASE 1 CAPSULE: 24000; 76000; 120000 CAPSULE, DELAYED RELEASE PELLETS ORAL at 16:45

## 2019-07-24 RX ADMIN — SODIUM CHLORIDE: 9 INJECTION, SOLUTION INTRAVENOUS at 09:10

## 2019-07-24 RX ADMIN — PANCRELIPASE 1 CAPSULE: 24000; 76000; 120000 CAPSULE, DELAYED RELEASE PELLETS ORAL at 07:41

## 2019-07-24 RX ADMIN — ASPIRIN 81 MG 81 MG: 81 TABLET ORAL at 09:12

## 2019-07-24 RX ADMIN — OXYCODONE HYDROCHLORIDE 5 MG: 5 TABLET ORAL at 01:55

## 2019-07-24 RX ADMIN — OCTREOTIDE ACETATE 100 MCG: 100 INJECTION, SOLUTION INTRAVENOUS; SUBCUTANEOUS at 01:51

## 2019-07-24 RX ADMIN — OXYCODONE HYDROCHLORIDE 5 MG: 5 TABLET ORAL at 12:23

## 2019-07-24 RX ADMIN — OCTREOTIDE ACETATE 100 MCG: 100 INJECTION, SOLUTION INTRAVENOUS; SUBCUTANEOUS at 17:45

## 2019-07-24 ASSESSMENT — PAIN DESCRIPTION - ORIENTATION
ORIENTATION: LEFT;LOWER

## 2019-07-24 ASSESSMENT — PAIN - FUNCTIONAL ASSESSMENT
PAIN_FUNCTIONAL_ASSESSMENT: ACTIVITIES ARE NOT PREVENTED
PAIN_FUNCTIONAL_ASSESSMENT: ACTIVITIES ARE NOT PREVENTED
PAIN_FUNCTIONAL_ASSESSMENT: PREVENTS OR INTERFERES SOME ACTIVE ACTIVITIES AND ADLS

## 2019-07-24 ASSESSMENT — PAIN DESCRIPTION - ONSET
ONSET: ON-GOING
ONSET: ON-GOING

## 2019-07-24 ASSESSMENT — ENCOUNTER SYMPTOMS
NAUSEA: 1
VOMITING: 1
DIARRHEA: 1

## 2019-07-24 ASSESSMENT — PAIN SCALES - GENERAL
PAINLEVEL_OUTOF10: 3
PAINLEVEL_OUTOF10: 5
PAINLEVEL_OUTOF10: 3
PAINLEVEL_OUTOF10: 3
PAINLEVEL_OUTOF10: 5
PAINLEVEL_OUTOF10: 3
PAINLEVEL_OUTOF10: 7
PAINLEVEL_OUTOF10: 3
PAINLEVEL_OUTOF10: 5

## 2019-07-24 ASSESSMENT — PAIN DESCRIPTION - PAIN TYPE
TYPE: CHRONIC PAIN

## 2019-07-24 ASSESSMENT — PAIN DESCRIPTION - PROGRESSION
CLINICAL_PROGRESSION: NOT CHANGED
CLINICAL_PROGRESSION: NOT CHANGED

## 2019-07-24 ASSESSMENT — PAIN DESCRIPTION - FREQUENCY
FREQUENCY: CONTINUOUS
FREQUENCY: CONTINUOUS

## 2019-07-24 ASSESSMENT — PAIN DESCRIPTION - DESCRIPTORS
DESCRIPTORS: ACHING;DISCOMFORT
DESCRIPTORS: ACHING;CRAMPING
DESCRIPTORS: ACHING;DISCOMFORT

## 2019-07-24 ASSESSMENT — PAIN DESCRIPTION - LOCATION
LOCATION: BACK;ABDOMEN
LOCATION: ABDOMEN
LOCATION: ABDOMEN;BACK
LOCATION: BACK;ABDOMEN

## 2019-07-30 ENCOUNTER — HOSPITAL ENCOUNTER (OUTPATIENT)
Dept: INFUSION THERAPY | Age: 59
Discharge: HOME | End: 2019-07-30
Payer: COMMERCIAL

## 2019-07-30 ENCOUNTER — TELEPHONE (OUTPATIENT)
Dept: ONCOLOGY | Age: 59
End: 2019-07-30

## 2019-07-30 ENCOUNTER — OFFICE VISIT (OUTPATIENT)
Dept: ONCOLOGY | Age: 59
End: 2019-07-30
Payer: COMMERCIAL

## 2019-07-30 VITALS
SYSTOLIC BLOOD PRESSURE: 111 MMHG | WEIGHT: 105 LBS | HEART RATE: 93 BPM | DIASTOLIC BLOOD PRESSURE: 78 MMHG | BODY MASS INDEX: 16.45 KG/M2 | TEMPERATURE: 98.2 F

## 2019-07-30 DIAGNOSIS — K52.1 CHEMOTHERAPY INDUCED DIARRHEA: ICD-10-CM

## 2019-07-30 DIAGNOSIS — F41.9 ANXIETY: ICD-10-CM

## 2019-07-30 DIAGNOSIS — T45.1X5A CINV (CHEMOTHERAPY-INDUCED NAUSEA AND VOMITING): ICD-10-CM

## 2019-07-30 DIAGNOSIS — T45.1X5A CHEMOTHERAPY INDUCED DIARRHEA: ICD-10-CM

## 2019-07-30 DIAGNOSIS — C25.1 MALIGNANT NEOPLASM OF BODY OF PANCREAS (HCC): Primary | ICD-10-CM

## 2019-07-30 DIAGNOSIS — R11.2 CINV (CHEMOTHERAPY-INDUCED NAUSEA AND VOMITING): ICD-10-CM

## 2019-07-30 DIAGNOSIS — C25.9 PANCREATIC ADENOCARCINOMA (HCC): ICD-10-CM

## 2019-07-30 LAB
ABSOLUTE EOS #: 0 K/UL (ref 0–0.4)
ABSOLUTE IMMATURE GRANULOCYTE: ABNORMAL K/UL (ref 0–0.3)
ABSOLUTE LYMPH #: 0.8 K/UL (ref 1–4.8)
ABSOLUTE MONO #: 0.3 K/UL (ref 0.1–1.2)
ALBUMIN SERPL-MCNC: 3.8 G/DL (ref 3.5–5.2)
ALBUMIN/GLOBULIN RATIO: 1.5 (ref 1–2.5)
ALP BLD-CCNC: 56 U/L (ref 35–104)
ALT SERPL-CCNC: 18 U/L (ref 5–33)
ANION GAP SERPL CALCULATED.3IONS-SCNC: 9 MMOL/L (ref 9–17)
AST SERPL-CCNC: 19 U/L
BASOPHILS # BLD: 1 % (ref 0–2)
BASOPHILS ABSOLUTE: 0 K/UL (ref 0–0.2)
BILIRUB SERPL-MCNC: 0.36 MG/DL (ref 0.3–1.2)
BUN BLDV-MCNC: 12 MG/DL (ref 6–20)
BUN/CREAT BLD: ABNORMAL (ref 9–20)
CA 19-9: ABNORMAL U/ML (ref 0–35)
CALCIUM SERPL-MCNC: 9.3 MG/DL (ref 8.6–10.4)
CHLORIDE BLD-SCNC: 104 MMOL/L (ref 98–107)
CO2: 26 MMOL/L (ref 20–31)
CREAT SERPL-MCNC: 0.4 MG/DL (ref 0.5–0.9)
DIFFERENTIAL TYPE: ABNORMAL
EOSINOPHILS RELATIVE PERCENT: 1 % (ref 1–4)
GFR AFRICAN AMERICAN: >60 ML/MIN
GFR NON-AFRICAN AMERICAN: >60 ML/MIN
GFR SERPL CREATININE-BSD FRML MDRD: ABNORMAL ML/MIN/{1.73_M2}
GFR SERPL CREATININE-BSD FRML MDRD: ABNORMAL ML/MIN/{1.73_M2}
GLUCOSE BLD-MCNC: 108 MG/DL (ref 70–99)
HCT VFR BLD CALC: 32.6 % (ref 36–46)
HEMOGLOBIN: 11.1 G/DL (ref 12–16)
IMMATURE GRANULOCYTES: ABNORMAL %
LYMPHOCYTES # BLD: 38 % (ref 24–44)
MCH RBC QN AUTO: 28.8 PG (ref 26–34)
MCHC RBC AUTO-ENTMCNC: 34 G/DL (ref 31–37)
MCV RBC AUTO: 84.8 FL (ref 80–100)
MONOCYTES # BLD: 14 % (ref 2–11)
NRBC AUTOMATED: ABNORMAL PER 100 WBC
PDW BLD-RTO: 14.3 % (ref 12.5–15.4)
PLATELET # BLD: 220 K/UL (ref 140–450)
PLATELET ESTIMATE: ABNORMAL
PMV BLD AUTO: 8.3 FL (ref 6–12)
POTASSIUM SERPL-SCNC: 3.6 MMOL/L (ref 3.7–5.3)
RBC # BLD: 3.84 M/UL (ref 4–5.2)
RBC # BLD: ABNORMAL 10*6/UL
SEG NEUTROPHILS: 46 % (ref 36–66)
SEGMENTED NEUTROPHILS ABSOLUTE COUNT: 1 K/UL (ref 1.8–7.7)
SODIUM BLD-SCNC: 139 MMOL/L (ref 135–144)
TOTAL PROTEIN: 6.4 G/DL (ref 6.4–8.3)
WBC # BLD: 2.2 K/UL (ref 3.5–11)
WBC # BLD: ABNORMAL 10*3/UL

## 2019-07-30 PROCEDURE — 2580000003 HC RX 258: Performed by: INTERNAL MEDICINE

## 2019-07-30 PROCEDURE — 96368 THER/DIAG CONCURRENT INF: CPT

## 2019-07-30 PROCEDURE — 96415 CHEMO IV INFUSION ADDL HR: CPT

## 2019-07-30 PROCEDURE — 85025 COMPLETE CBC W/AUTO DIFF WBC: CPT

## 2019-07-30 PROCEDURE — 96375 TX/PRO/DX INJ NEW DRUG ADDON: CPT

## 2019-07-30 PROCEDURE — 96413 CHEMO IV INFUSION 1 HR: CPT

## 2019-07-30 PROCEDURE — 80053 COMPREHEN METABOLIC PANEL: CPT

## 2019-07-30 PROCEDURE — 99215 OFFICE O/P EST HI 40 MIN: CPT | Performed by: INTERNAL MEDICINE

## 2019-07-30 PROCEDURE — 6360000002 HC RX W HCPCS: Performed by: INTERNAL MEDICINE

## 2019-07-30 PROCEDURE — 86301 IMMUNOASSAY TUMOR CA 19-9: CPT

## 2019-07-30 PROCEDURE — 96411 CHEMO IV PUSH ADDL DRUG: CPT

## 2019-07-30 PROCEDURE — 96416 CHEMO PROLONG INFUSE W/PUMP: CPT

## 2019-07-30 PROCEDURE — 36591 DRAW BLOOD OFF VENOUS DEVICE: CPT

## 2019-07-30 PROCEDURE — 96367 TX/PROPH/DG ADDL SEQ IV INF: CPT

## 2019-07-30 RX ORDER — METHYLPREDNISOLONE SODIUM SUCCINATE 125 MG/2ML
125 INJECTION, POWDER, LYOPHILIZED, FOR SOLUTION INTRAMUSCULAR; INTRAVENOUS ONCE
Status: CANCELLED | OUTPATIENT
Start: 2019-08-26

## 2019-07-30 RX ORDER — METHYLPREDNISOLONE SODIUM SUCCINATE 125 MG/2ML
125 INJECTION, POWDER, LYOPHILIZED, FOR SOLUTION INTRAMUSCULAR; INTRAVENOUS ONCE
Status: CANCELLED | OUTPATIENT
Start: 2019-08-12

## 2019-07-30 RX ORDER — SODIUM CHLORIDE 9 MG/ML
INJECTION, SOLUTION INTRAVENOUS CONTINUOUS
Status: CANCELLED | OUTPATIENT
Start: 2019-08-12

## 2019-07-30 RX ORDER — ATROPINE SULFATE 0.4 MG/ML
0.4 AMPUL (ML) INJECTION
Status: COMPLETED | OUTPATIENT
Start: 2019-07-30 | End: 2019-07-30

## 2019-07-30 RX ORDER — HEPARIN SODIUM (PORCINE) LOCK FLUSH IV SOLN 100 UNIT/ML 100 UNIT/ML
500 SOLUTION INTRAVENOUS PRN
Status: DISCONTINUED | OUTPATIENT
Start: 2019-07-30 | End: 2019-07-31 | Stop reason: HOSPADM

## 2019-07-30 RX ORDER — FLUOROURACIL 50 MG/ML
400 INJECTION, SOLUTION INTRAVENOUS ONCE
Status: CANCELLED | OUTPATIENT
Start: 2019-08-12

## 2019-07-30 RX ORDER — SODIUM CHLORIDE 9 MG/ML
INJECTION, SOLUTION INTRAVENOUS CONTINUOUS
Status: CANCELLED | OUTPATIENT
Start: 2019-08-26

## 2019-07-30 RX ORDER — DEXTROSE MONOHYDRATE 50 MG/ML
INJECTION, SOLUTION INTRAVENOUS ONCE
Status: CANCELLED | OUTPATIENT
Start: 2019-07-30

## 2019-07-30 RX ORDER — FLUOROURACIL 50 MG/ML
400 INJECTION, SOLUTION INTRAVENOUS ONCE
Status: CANCELLED | OUTPATIENT
Start: 2019-07-30

## 2019-07-30 RX ORDER — SODIUM CHLORIDE 0.9 % (FLUSH) 0.9 %
5 SYRINGE (ML) INJECTION PRN
Status: CANCELLED | OUTPATIENT
Start: 2019-08-12

## 2019-07-30 RX ORDER — SODIUM CHLORIDE 0.9 % (FLUSH) 0.9 %
5 SYRINGE (ML) INJECTION PRN
Status: CANCELLED | OUTPATIENT
Start: 2019-07-30

## 2019-07-30 RX ORDER — SODIUM CHLORIDE 9 MG/ML
INJECTION, SOLUTION INTRAVENOUS CONTINUOUS
Status: CANCELLED | OUTPATIENT
Start: 2019-07-30

## 2019-07-30 RX ORDER — OXYCODONE HYDROCHLORIDE 10 MG/1
10 TABLET ORAL EVERY 4 HOURS PRN
Qty: 120 TABLET | Refills: 0 | Status: SHIPPED | OUTPATIENT
Start: 2019-07-30 | End: 2019-07-30 | Stop reason: DRUGHIGH

## 2019-07-30 RX ORDER — METHYLPREDNISOLONE SODIUM SUCCINATE 125 MG/2ML
125 INJECTION, POWDER, LYOPHILIZED, FOR SOLUTION INTRAMUSCULAR; INTRAVENOUS ONCE
Status: CANCELLED | OUTPATIENT
Start: 2019-07-30

## 2019-07-30 RX ORDER — DEXTROSE MONOHYDRATE 50 MG/ML
INJECTION, SOLUTION INTRAVENOUS ONCE
Status: CANCELLED | OUTPATIENT
Start: 2019-08-26

## 2019-07-30 RX ORDER — SODIUM CHLORIDE 0.9 % (FLUSH) 0.9 %
10 SYRINGE (ML) INJECTION PRN
Status: CANCELLED | OUTPATIENT
Start: 2019-08-12

## 2019-07-30 RX ORDER — HEPARIN SODIUM (PORCINE) LOCK FLUSH IV SOLN 100 UNIT/ML 100 UNIT/ML
500 SOLUTION INTRAVENOUS PRN
Status: CANCELLED | OUTPATIENT
Start: 2019-08-12

## 2019-07-30 RX ORDER — OXYCODONE HYDROCHLORIDE 5 MG/1
5 TABLET ORAL EVERY 4 HOURS PRN
Qty: 120 TABLET | Refills: 0 | Status: SHIPPED | OUTPATIENT
Start: 2019-07-30 | End: 2019-09-03 | Stop reason: SDUPTHER

## 2019-07-30 RX ORDER — DIPHENHYDRAMINE HYDROCHLORIDE 50 MG/ML
50 INJECTION INTRAMUSCULAR; INTRAVENOUS ONCE
Status: CANCELLED | OUTPATIENT
Start: 2019-08-12

## 2019-07-30 RX ORDER — SODIUM CHLORIDE 0.9 % (FLUSH) 0.9 %
10 SYRINGE (ML) INJECTION PRN
Status: CANCELLED | OUTPATIENT
Start: 2019-08-26

## 2019-07-30 RX ORDER — 0.9 % SODIUM CHLORIDE 0.9 %
10 VIAL (ML) INJECTION ONCE
Status: CANCELLED | OUTPATIENT
Start: 2019-07-30

## 2019-07-30 RX ORDER — FLUOROURACIL 50 MG/ML
600 INJECTION, SOLUTION INTRAVENOUS ONCE
Status: COMPLETED | OUTPATIENT
Start: 2019-07-30 | End: 2019-07-30

## 2019-07-30 RX ORDER — DEXTROSE MONOHYDRATE 50 MG/ML
INJECTION, SOLUTION INTRAVENOUS ONCE
Status: DISCONTINUED | OUTPATIENT
Start: 2019-07-30 | End: 2019-07-31 | Stop reason: HOSPADM

## 2019-07-30 RX ORDER — PALONOSETRON 0.05 MG/ML
0.25 INJECTION, SOLUTION INTRAVENOUS ONCE
Status: COMPLETED | OUTPATIENT
Start: 2019-07-30 | End: 2019-07-30

## 2019-07-30 RX ORDER — DIPHENHYDRAMINE HYDROCHLORIDE 50 MG/ML
50 INJECTION INTRAMUSCULAR; INTRAVENOUS ONCE
Status: CANCELLED | OUTPATIENT
Start: 2019-08-26

## 2019-07-30 RX ORDER — 0.9 % SODIUM CHLORIDE 0.9 %
10 VIAL (ML) INJECTION ONCE
Status: CANCELLED | OUTPATIENT
Start: 2019-08-12

## 2019-07-30 RX ORDER — DEXAMETHASONE SODIUM PHOSPHATE 10 MG/ML
10 INJECTION INTRAMUSCULAR; INTRAVENOUS ONCE
Status: COMPLETED | OUTPATIENT
Start: 2019-07-30 | End: 2019-07-30

## 2019-07-30 RX ORDER — PALONOSETRON 0.05 MG/ML
0.25 INJECTION, SOLUTION INTRAVENOUS ONCE
Status: CANCELLED | OUTPATIENT
Start: 2019-08-12

## 2019-07-30 RX ORDER — 0.9 % SODIUM CHLORIDE 0.9 %
10 VIAL (ML) INJECTION ONCE
Status: CANCELLED | OUTPATIENT
Start: 2019-08-26

## 2019-07-30 RX ORDER — ATROPINE SULFATE 0.4 MG/ML
0.4 AMPUL (ML) INJECTION
Status: CANCELLED | OUTPATIENT
Start: 2019-08-12

## 2019-07-30 RX ORDER — HEPARIN SODIUM (PORCINE) LOCK FLUSH IV SOLN 100 UNIT/ML 100 UNIT/ML
500 SOLUTION INTRAVENOUS PRN
Status: CANCELLED | OUTPATIENT
Start: 2019-08-26

## 2019-07-30 RX ORDER — PALONOSETRON 0.05 MG/ML
0.25 INJECTION, SOLUTION INTRAVENOUS ONCE
Status: CANCELLED | OUTPATIENT
Start: 2019-08-26

## 2019-07-30 RX ORDER — DIPHENHYDRAMINE HYDROCHLORIDE 50 MG/ML
50 INJECTION INTRAMUSCULAR; INTRAVENOUS ONCE
Status: CANCELLED | OUTPATIENT
Start: 2019-07-30

## 2019-07-30 RX ORDER — FLUOROURACIL 50 MG/ML
400 INJECTION, SOLUTION INTRAVENOUS ONCE
Status: CANCELLED | OUTPATIENT
Start: 2019-08-26

## 2019-07-30 RX ORDER — SODIUM CHLORIDE 0.9 % (FLUSH) 0.9 %
5 SYRINGE (ML) INJECTION PRN
Status: CANCELLED | OUTPATIENT
Start: 2019-08-26

## 2019-07-30 RX ORDER — SODIUM CHLORIDE 0.9 % (FLUSH) 0.9 %
10 SYRINGE (ML) INJECTION PRN
Status: DISCONTINUED | OUTPATIENT
Start: 2019-07-30 | End: 2019-07-31 | Stop reason: HOSPADM

## 2019-07-30 RX ORDER — DEXTROSE MONOHYDRATE 50 MG/ML
INJECTION, SOLUTION INTRAVENOUS ONCE
Status: CANCELLED | OUTPATIENT
Start: 2019-08-12

## 2019-07-30 RX ORDER — ATROPINE SULFATE 0.4 MG/ML
0.4 AMPUL (ML) INJECTION
Status: CANCELLED | OUTPATIENT
Start: 2019-08-26

## 2019-07-30 RX ADMIN — LEUCOVORIN CALCIUM 600 MG: 100 INJECTION, POWDER, LYOPHILIZED, FOR SOLUTION INTRAMUSCULAR; INTRAVENOUS at 14:32

## 2019-07-30 RX ADMIN — DEXAMETHASONE SODIUM PHOSPHATE 10 MG: 10 INJECTION INTRAMUSCULAR; INTRAVENOUS at 12:57

## 2019-07-30 RX ADMIN — FLUOROURACIL 600 MG: 50 INJECTION, SOLUTION INTRAVENOUS at 16:04

## 2019-07-30 RX ADMIN — PALONOSETRON HYDROCHLORIDE 0.25 MG: 0.25 INJECTION, SOLUTION INTRAVENOUS at 12:55

## 2019-07-30 RX ADMIN — DEXTROSE MONOHYDRATE 100 MG: 50 INJECTION, SOLUTION INTRAVENOUS at 14:32

## 2019-07-30 RX ADMIN — DEXTROSE MONOHYDRATE: 50 INJECTION, SOLUTION INTRAVENOUS at 12:51

## 2019-07-30 RX ADMIN — ATROPINE SULFATE 0.4 MG: 0.4 INJECTION, SOLUTION INTRAMUSCULAR; INTRAVENOUS; SUBCUTANEOUS at 14:23

## 2019-07-30 RX ADMIN — Medication 10 ML: at 16:04

## 2019-07-30 RX ADMIN — SODIUM CHLORIDE 150 MG: 900 INJECTION, SOLUTION INTRAVENOUS at 13:33

## 2019-07-30 RX ADMIN — FLUOROURACIL 3550 MG: 50 INJECTION, SOLUTION INTRAVENOUS at 16:04

## 2019-07-30 NOTE — PROGRESS NOTES
Pt here for C.2D.1 FOLFIRI. Labs drawn from port and results reviewed. Pt was seen by Dr. Mary Atkinson, Irinotecan dose reduced-see orders, Emend added to D.1 and Neulasta added to D.3. Pharmacy and pre-cert notified. Pt was treated without incident and d/c'd in stable condition. Pt will return on 8/1/19 for pump d/c. She was instructed to call the office if she experiences uncontrolled N/V/D for possible hydration.

## 2019-07-30 NOTE — PROGRESS NOTES
DIAGNOSIS:   1. Pancreatic invasive ductal adenocarcinoma, stage III  2. Liver lesions, metastatic disease 05/2019  3. 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 Therapy induced nausea and vomiting as well as diarrhea chemotherapy with FOLFIRI    CURRENT THERAPY:  1. FOLFIRI - started 12/04/2018, received three cycles. With radiologic response  2. Attempted resection, unfortunately aborted because of realization of liver metastases. 3. Second opinion recommending going back to FOLFIRI chemotherapy because she had response to it previously. BRIEF CASE HISTORY:  Briana Simons is a very pleasant 66 y.o. female who is referred to us for pancreatic cancer. She reports abdominal pain started 09/2018, worse at night, she consulted with PCP and she started acid reducer. She was referred to GI and EGD and colonoscopy was done - both negative, CT was done showing mass in the body of the pancreas measuring 3.6 x 2.6 x 2.5 cm. She underwent endoscopic ultrasound for biopsy, pathology confirmed pancreatic invasive ductal adenocarcinoma. She has had some weight loss, she has had a lot of stress and is unsure if it is disease related. She has had insomnia due to discomfort. She has consult scheduled with surgeon. She underwent diagnostic laparoscopy. Mother had breast cancer. She has no history of chemical or radiation exposure. Patient was seen by [facility] for laparoscopy. Intraoperatively, a liver lesion was appreciated. Biopsy was negative for metastatic disease. The patient was found to have locally advanced disease and the recommendation was to proceed with neoadjuvant chemotherapy and possibly radiation depending on response. Treat with FOLFIRINOX was started 12/04/2018, she experienced significant nausea and opoid induced constipation. Following cycle #3 she underwent celiac block, she experienced significant neuropathy and work up indicated cord infarction.  She without any murmur. Abdomen: Soft, nontender. No hepatosplenomegaly. Surgery for laparoscopy is healing well. Extremities: Lower extremities show +1 edema on the left and trace on the right, pressure sore on medial aspect of right heel - completely healed. No clubbing, or cyanosis. Breasts: Examination not done today. Neuro exam: intact cranial nerves bilaterally, she continues to have weakness and numbness in both lower extremities but continued improvement compared to last exam Lymphatic: no adenopathy appreciated in the supraclavicular, axillary, cervical or inguinal area  Skin: grade 1 bedsore in sacral area    REVIEW OF LABORATORY DATA:   Lab Results   Component Value Date    WBC 2.2 (L) 07/30/2019    HGB 11.1 (L) 07/30/2019    HCT 32.6 (L) 07/30/2019    MCV 84.8 07/30/2019     07/30/2019     Lab Results   Component Value Date    NEUTROABS 1.00 (L) 07/30/2019         Chemistry        Component Value Date/Time     07/30/2019 1124    K 3.6 (L) 07/30/2019 1124     07/30/2019 1124    CO2 26 07/30/2019 1124    BUN 12 07/30/2019 1124    CREATININE 0.40 (L) 07/30/2019 1124        Component Value Date/Time    CALCIUM 9.3 07/30/2019 1124    ALKPHOS 56 07/30/2019 1124    AST 19 07/30/2019 1124    ALT 18 07/30/2019 1124    BILITOT 0.36 07/30/2019 1124              PATHOLOGY:     REVIEW OF RADIOLOGICAL RESULTS:     PATHOLOGY:       IMPRESSION:   1. Invasive ductal adenocarcinoma of the pancreas, stage III  2. Uche adjuvant FOLFIRINOX started 12/04/2018  3. Liver lesions found, removed during surgery 05/2019 and pathology confirmed metastatic disease  4. Plan for second opinion  5. Plan palliative chemo  6. Considering SBRT to primary tumor     PLAN:   1. We reviewed her lab work, her counts are in range for treatment. 2. I completed toxicity check - significant improvement. 3. Due to poor tolerance of Irinotecan and discussed alternatives. Including continuing FOLFIRI with dose reduction.   Check to

## 2019-08-01 ENCOUNTER — TELEPHONE (OUTPATIENT)
Dept: ONCOLOGY | Age: 59
End: 2019-08-01

## 2019-08-01 ENCOUNTER — HOSPITAL ENCOUNTER (OUTPATIENT)
Dept: INFUSION THERAPY | Age: 59
Discharge: HOME | End: 2019-08-01
Payer: COMMERCIAL

## 2019-08-01 DIAGNOSIS — C25.9 PANCREATIC ADENOCARCINOMA (HCC): Primary | ICD-10-CM

## 2019-08-01 PROCEDURE — 6360000002 HC RX W HCPCS: Performed by: INTERNAL MEDICINE

## 2019-08-01 PROCEDURE — 96523 IRRIG DRUG DELIVERY DEVICE: CPT | Performed by: NURSE PRACTITIONER

## 2019-08-01 PROCEDURE — 96377 APPLICATON ON-BODY INJECTOR: CPT | Performed by: NURSE PRACTITIONER

## 2019-08-01 PROCEDURE — 2580000003 HC RX 258: Performed by: INTERNAL MEDICINE

## 2019-08-01 RX ORDER — SODIUM CHLORIDE 0.9 % (FLUSH) 0.9 %
20 SYRINGE (ML) INJECTION PRN
Status: CANCELLED | OUTPATIENT
Start: 2019-08-01

## 2019-08-01 RX ORDER — SODIUM CHLORIDE 0.9 % (FLUSH) 0.9 %
10 SYRINGE (ML) INJECTION PRN
Status: CANCELLED | OUTPATIENT
Start: 2019-08-01

## 2019-08-01 RX ORDER — SODIUM CHLORIDE 0.9 % (FLUSH) 0.9 %
10 SYRINGE (ML) INJECTION PRN
Status: DISCONTINUED | OUTPATIENT
Start: 2019-08-01 | End: 2019-08-02 | Stop reason: HOSPADM

## 2019-08-01 RX ORDER — HEPARIN SODIUM (PORCINE) LOCK FLUSH IV SOLN 100 UNIT/ML 100 UNIT/ML
500 SOLUTION INTRAVENOUS PRN
Status: CANCELLED | OUTPATIENT
Start: 2019-08-01

## 2019-08-01 RX ORDER — HEPARIN SODIUM (PORCINE) LOCK FLUSH IV SOLN 100 UNIT/ML 100 UNIT/ML
500 SOLUTION INTRAVENOUS PRN
Status: DISCONTINUED | OUTPATIENT
Start: 2019-08-01 | End: 2019-08-02 | Stop reason: HOSPADM

## 2019-08-01 RX ADMIN — Medication 10 ML: at 14:44

## 2019-08-01 RX ADMIN — Medication 500 UNITS: at 14:44

## 2019-08-01 RX ADMIN — PEGFILGRASTIM 6 MG: KIT SUBCUTANEOUS at 14:47

## 2019-08-01 NOTE — PROGRESS NOTES
Patient here for pump d/c. She stated she feels good. She declined to stay for fluids. She tolerated neulasta OBI well. Patient and her  verbalized understanding of the OBI.

## 2019-08-05 ENCOUNTER — TELEPHONE (OUTPATIENT)
Dept: ONCOLOGY | Age: 59
End: 2019-08-05

## 2019-08-06 ENCOUNTER — TELEPHONE (OUTPATIENT)
Dept: ONCOLOGY | Age: 59
End: 2019-08-06

## 2019-08-06 RX ORDER — MAGNESIUM CITRATE
296 SOLUTION, ORAL ORAL ONCE
Qty: 1 BOTTLE | Refills: 0 | Status: SHIPPED | OUTPATIENT
Start: 2019-08-06 | End: 2019-08-06

## 2019-08-09 ENCOUNTER — TELEPHONE (OUTPATIENT)
Dept: ONCOLOGY | Age: 59
End: 2019-08-09

## 2019-08-13 ENCOUNTER — TELEPHONE (OUTPATIENT)
Dept: ONCOLOGY | Age: 59
End: 2019-08-13

## 2019-08-13 ENCOUNTER — HOSPITAL ENCOUNTER (OUTPATIENT)
Dept: INFUSION THERAPY | Age: 59
Discharge: HOME | End: 2019-08-13
Payer: COMMERCIAL

## 2019-08-13 ENCOUNTER — OFFICE VISIT (OUTPATIENT)
Dept: ONCOLOGY | Age: 59
End: 2019-08-13
Payer: COMMERCIAL

## 2019-08-13 VITALS
WEIGHT: 100.6 LBS | HEART RATE: 99 BPM | BODY MASS INDEX: 15.76 KG/M2 | RESPIRATION RATE: 16 BRPM | TEMPERATURE: 97.7 F | SYSTOLIC BLOOD PRESSURE: 127 MMHG | DIASTOLIC BLOOD PRESSURE: 84 MMHG

## 2019-08-13 VITALS
HEART RATE: 99 BPM | DIASTOLIC BLOOD PRESSURE: 84 MMHG | BODY MASS INDEX: 15.76 KG/M2 | TEMPERATURE: 97.7 F | WEIGHT: 100.6 LBS | RESPIRATION RATE: 16 BRPM | SYSTOLIC BLOOD PRESSURE: 127 MMHG

## 2019-08-13 DIAGNOSIS — T45.1X5A CINV (CHEMOTHERAPY-INDUCED NAUSEA AND VOMITING): ICD-10-CM

## 2019-08-13 DIAGNOSIS — C25.9 PANCREATIC ADENOCARCINOMA (HCC): ICD-10-CM

## 2019-08-13 DIAGNOSIS — R11.2 CINV (CHEMOTHERAPY-INDUCED NAUSEA AND VOMITING): ICD-10-CM

## 2019-08-13 DIAGNOSIS — C25.1 MALIGNANT NEOPLASM OF BODY OF PANCREAS (HCC): Primary | ICD-10-CM

## 2019-08-13 DIAGNOSIS — F41.9 ANXIETY: ICD-10-CM

## 2019-08-13 LAB
ABSOLUTE EOS #: 0 K/UL (ref 0–0.4)
ABSOLUTE IMMATURE GRANULOCYTE: ABNORMAL K/UL (ref 0–0.3)
ABSOLUTE LYMPH #: 0.7 K/UL (ref 1–4.8)
ABSOLUTE MONO #: 0.3 K/UL (ref 0.1–1.2)
ALBUMIN SERPL-MCNC: 4.3 G/DL (ref 3.5–5.2)
ALBUMIN/GLOBULIN RATIO: 1.7 (ref 1–2.5)
ALP BLD-CCNC: 64 U/L (ref 35–104)
ALT SERPL-CCNC: 20 U/L (ref 5–33)
ANION GAP SERPL CALCULATED.3IONS-SCNC: 9 MMOL/L (ref 9–17)
AST SERPL-CCNC: 19 U/L
BASOPHILS # BLD: 1 % (ref 0–2)
BASOPHILS ABSOLUTE: 0 K/UL (ref 0–0.2)
BILIRUB SERPL-MCNC: 0.34 MG/DL (ref 0.3–1.2)
BUN BLDV-MCNC: 13 MG/DL (ref 6–20)
BUN/CREAT BLD: ABNORMAL (ref 9–20)
CALCIUM SERPL-MCNC: 9.7 MG/DL (ref 8.6–10.4)
CHLORIDE BLD-SCNC: 105 MMOL/L (ref 98–107)
CO2: 26 MMOL/L (ref 20–31)
CREAT SERPL-MCNC: 0.42 MG/DL (ref 0.5–0.9)
DIFFERENTIAL TYPE: ABNORMAL
EOSINOPHILS RELATIVE PERCENT: 1 % (ref 1–4)
GFR AFRICAN AMERICAN: >60 ML/MIN
GFR NON-AFRICAN AMERICAN: >60 ML/MIN
GFR SERPL CREATININE-BSD FRML MDRD: ABNORMAL ML/MIN/{1.73_M2}
GFR SERPL CREATININE-BSD FRML MDRD: ABNORMAL ML/MIN/{1.73_M2}
GLUCOSE BLD-MCNC: 125 MG/DL (ref 70–99)
HCT VFR BLD CALC: 31.1 % (ref 36–46)
HEMOGLOBIN: 10.6 G/DL (ref 12–16)
IMMATURE GRANULOCYTES: ABNORMAL %
LYMPHOCYTES # BLD: 21 % (ref 24–44)
MCH RBC QN AUTO: 28.8 PG (ref 26–34)
MCHC RBC AUTO-ENTMCNC: 34.1 G/DL (ref 31–37)
MCV RBC AUTO: 84.7 FL (ref 80–100)
MONOCYTES # BLD: 10 % (ref 2–11)
NRBC AUTOMATED: ABNORMAL PER 100 WBC
PDW BLD-RTO: 15.8 % (ref 12.5–15.4)
PLATELET # BLD: 221 K/UL (ref 140–450)
PLATELET ESTIMATE: ABNORMAL
PMV BLD AUTO: 7.5 FL (ref 6–12)
POTASSIUM SERPL-SCNC: 4.1 MMOL/L (ref 3.7–5.3)
RBC # BLD: 3.68 M/UL (ref 4–5.2)
RBC # BLD: ABNORMAL 10*6/UL
SEG NEUTROPHILS: 67 % (ref 36–66)
SEGMENTED NEUTROPHILS ABSOLUTE COUNT: 2.3 K/UL (ref 1.8–7.7)
SODIUM BLD-SCNC: 140 MMOL/L (ref 135–144)
TOTAL PROTEIN: 6.8 G/DL (ref 6.4–8.3)
WBC # BLD: 3.4 K/UL (ref 3.5–11)
WBC # BLD: ABNORMAL 10*3/UL

## 2019-08-13 PROCEDURE — 85025 COMPLETE CBC W/AUTO DIFF WBC: CPT

## 2019-08-13 PROCEDURE — 36591 DRAW BLOOD OFF VENOUS DEVICE: CPT

## 2019-08-13 PROCEDURE — 96415 CHEMO IV INFUSION ADDL HR: CPT

## 2019-08-13 PROCEDURE — 96368 THER/DIAG CONCURRENT INF: CPT

## 2019-08-13 PROCEDURE — 96413 CHEMO IV INFUSION 1 HR: CPT

## 2019-08-13 PROCEDURE — 2580000003 HC RX 258: Performed by: INTERNAL MEDICINE

## 2019-08-13 PROCEDURE — 80053 COMPREHEN METABOLIC PANEL: CPT

## 2019-08-13 PROCEDURE — 96411 CHEMO IV PUSH ADDL DRUG: CPT

## 2019-08-13 PROCEDURE — 96367 TX/PROPH/DG ADDL SEQ IV INF: CPT

## 2019-08-13 PROCEDURE — 99214 OFFICE O/P EST MOD 30 MIN: CPT | Performed by: INTERNAL MEDICINE

## 2019-08-13 PROCEDURE — 96416 CHEMO PROLONG INFUSE W/PUMP: CPT

## 2019-08-13 PROCEDURE — 96375 TX/PRO/DX INJ NEW DRUG ADDON: CPT

## 2019-08-13 PROCEDURE — 6360000002 HC RX W HCPCS: Performed by: INTERNAL MEDICINE

## 2019-08-13 RX ORDER — FLUOROURACIL 50 MG/ML
600 INJECTION, SOLUTION INTRAVENOUS ONCE
Status: COMPLETED | OUTPATIENT
Start: 2019-08-13 | End: 2019-08-13

## 2019-08-13 RX ORDER — SODIUM CHLORIDE 0.9 % (FLUSH) 0.9 %
10 SYRINGE (ML) INJECTION PRN
Status: DISCONTINUED | OUTPATIENT
Start: 2019-08-13 | End: 2019-08-14 | Stop reason: HOSPADM

## 2019-08-13 RX ORDER — SCOLOPAMINE TRANSDERMAL SYSTEM 1 MG/1
1 PATCH, EXTENDED RELEASE TRANSDERMAL
Qty: 3 PATCH | Refills: 0 | Status: SHIPPED | OUTPATIENT
Start: 2019-08-13 | End: 2019-11-19 | Stop reason: ALTCHOICE

## 2019-08-13 RX ORDER — POLYETHYLENE GLYCOL 3350 17 G/17G
17 POWDER, FOR SOLUTION ORAL DAILY
Qty: 225 G | Refills: 3 | Status: ON HOLD | OUTPATIENT
Start: 2019-08-13 | End: 2020-02-10

## 2019-08-13 RX ORDER — DEXTROSE MONOHYDRATE 50 MG/ML
INJECTION, SOLUTION INTRAVENOUS ONCE
Status: COMPLETED | OUTPATIENT
Start: 2019-08-13 | End: 2019-08-13

## 2019-08-13 RX ORDER — HEPARIN SODIUM (PORCINE) LOCK FLUSH IV SOLN 100 UNIT/ML 100 UNIT/ML
500 SOLUTION INTRAVENOUS PRN
Status: DISCONTINUED | OUTPATIENT
Start: 2019-08-13 | End: 2019-08-14 | Stop reason: HOSPADM

## 2019-08-13 RX ORDER — POLYETHYLENE GLYCOL 3350 17 G/17G
17 POWDER, FOR SOLUTION ORAL DAILY
COMMUNITY
End: 2019-08-13 | Stop reason: SDUPTHER

## 2019-08-13 RX ORDER — PALONOSETRON 0.05 MG/ML
0.25 INJECTION, SOLUTION INTRAVENOUS ONCE
Status: COMPLETED | OUTPATIENT
Start: 2019-08-13 | End: 2019-08-13

## 2019-08-13 RX ORDER — ATROPINE SULFATE 0.4 MG/ML
0.4 AMPUL (ML) INJECTION
Status: COMPLETED | OUTPATIENT
Start: 2019-08-13 | End: 2019-08-13

## 2019-08-13 RX ORDER — DEXAMETHASONE SODIUM PHOSPHATE 10 MG/ML
10 INJECTION INTRAMUSCULAR; INTRAVENOUS ONCE
Status: COMPLETED | OUTPATIENT
Start: 2019-08-13 | End: 2019-08-13

## 2019-08-13 RX ADMIN — SODIUM CHLORIDE 150 MG: 900 INJECTION, SOLUTION INTRAVENOUS at 12:02

## 2019-08-13 RX ADMIN — DEXAMETHASONE SODIUM PHOSPHATE 10 MG: 10 INJECTION INTRAMUSCULAR; INTRAVENOUS at 11:52

## 2019-08-13 RX ADMIN — PALONOSETRON HYDROCHLORIDE 0.25 MG: 0.25 INJECTION, SOLUTION INTRAVENOUS at 11:51

## 2019-08-13 RX ADMIN — ATROPINE SULFATE 0.4 MG: 0.4 INJECTION, SOLUTION INTRAMUSCULAR; INTRAVENOUS; SUBCUTANEOUS at 13:14

## 2019-08-13 RX ADMIN — Medication 10 ML: at 11:04

## 2019-08-13 RX ADMIN — Medication 10 ML: at 11:06

## 2019-08-13 RX ADMIN — DEXTROSE MONOHYDRATE: 50 INJECTION, SOLUTION INTRAVENOUS at 11:51

## 2019-08-13 RX ADMIN — LEUCOVORIN CALCIUM 600 MG: 350 INJECTION, POWDER, LYOPHILIZED, FOR SOLUTION INTRAMUSCULAR; INTRAVENOUS at 13:14

## 2019-08-13 RX ADMIN — FLUOROURACIL 600 MG: 50 INJECTION, SOLUTION INTRAVENOUS at 14:55

## 2019-08-13 RX ADMIN — FLUOROURACIL 3550 MG: 50 INJECTION, SOLUTION INTRAVENOUS at 14:58

## 2019-08-13 RX ADMIN — Medication 10 ML: at 11:05

## 2019-08-13 RX ADMIN — DEXTROSE MONOHYDRATE 140 MG: 50 INJECTION, SOLUTION INTRAVENOUS at 13:14

## 2019-08-13 RX ADMIN — Medication 10 ML: at 14:55

## 2019-08-13 ASSESSMENT — PAIN SCALES - GENERAL: PAINLEVEL_OUTOF10: 2

## 2019-08-13 NOTE — PROGRESS NOTES
[facility] Nutrition Follow Up       Johan Sheridan is a 66 y.o.  female     Subjective/Current Data:  Pt called writer and asked to visit during infusion. Pt reports difficulty with diet tolerance despite following low fat diet and using Creon. Pt reports diarrhea continues. RD inquired about Creon administration. Pt reports is only using Creon when food has fat content. RD educated pt on Creon and recommended pt use Creon with each meal as digestive enzymes also aid in starch digestion. Pt receptive to information. RD also provided high calorie, high protein diet food recommendations specific for pancreatic issues. Pt and  receptive to information. Nutritional Requirements:  Estimated Energy Needs:  Weight Used: 45.5kg    Method Used: Bangcle   Estimated kcal Needs: 9045-3685 kcal per day  Protein Needs:  Weight used: 45.5kg  1.3-1.5 g/kg = 59-68 g per day    Nutrition-focused Physical Findings  GI Symptoms: poor appetite, nausea and diarrhea             Skin: no issues reported  Intake vs. Estimated Needs: likely less than needs    Nutrition Diagnosis and Goal  Problem:  Severe malnutrition  Etiology/related to: catabolic illness  Symptoms/Signs/as evidenced by: poor diet tolerance, wt loss, pancreatic ca, oily stools, physical assessment. Goal: Adequate intake to aide in wt maintenaince, signs and symptoms management, and overall wellbeing. Progress towards goal: gradually worsening    NUTRITION RECOMMENDATIONS / MONITORING / EVALUATION  1. Encourage Creon capsule with each meal  2. Recommended food ideas specific for pan can diet tolerance. 3. Monitor wts, labs, po intakes, s/s management, plan of care.     Violeta Mojica RD, LD  Oncology Dietitian  [place] [address]  Office: [phone]  Pager: [phone]

## 2019-08-13 NOTE — PROGRESS NOTES
lorazepam, senna-docusate, prochlorperazine, aspirin, lidocaine-prilocaine, and ondansetron, and the following Facility-Administered Medications: heparin flush, sodium chloride flush, dextrose, fosaprepitant (EMEND) 150 mg in sodium chloride 0.9 % 150 mL IVPB, atropine, irinotecan (CAMPTOSAR) 260 mg in dextrose 5 % 500 mL chemo IVPB, leucovorin calcium (WELLCOVORIN) 600 mg in dextrose 5 % 250 mL IVPB, fluorouracil, and fluorouracil (ADRUCIL) 3,550 mg in sodium chloride 0.9 % 100 mL chemo infusion. ALLERGIES:  has No Known Allergies. FAMILY HISTORY: Mother: breast cancer    SOCIAL HISTORY:  reports that she has never smoked. She has never used smokeless tobacco. She reports that she does not drink alcohol or use drugs. REVIEW OF SYSTEMS:   General: No fever or night sweats. Weight loss with constipation  ENT: No double or blurred vision, no tinnitus or hearing problem, no dysphagia or sore throat   Respiratory: No chest pain, no shortness of breath, no cough or hemoptysis. Cardiovascular: Denies chest pain, PND or orthopnea or palpitations, or LE swelling  Gastrointestinal: No diarrhea + abdominal pain - minimal; +nausea controlled, +constipation controlled  Genitourinary: Denies dysuria, hematuria, frequency, urgency or incontinence. Neurological: Denies headaches, decreased LOC, no sensory or motor focal deficits. Sensory neuropathy around L2 - improving. Musculoskeletal: No arthralgia no back pain or joint swelling. Skin: There are no rashes or bleeding. Psychiatric:  No anxiety, no depression. Endocrine: No diabetes or thyroid disease. Hematologic: No bleeding, no adenopathy. PHYSICAL EXAM: Shows a well appearing 66y.o.-year-old female who is not in pain or distress. Vital Signs: Blood pressure 127/84, pulse 99, temperature 97.7 °F (36.5 °C), temperature source Oral, resp. rate 16, weight 100 lb 9.6 oz (45.6 kg), last menstrual period 09/07/2012, not currently breastfeeding.  HEENT: Normocephalic and atraumatic. Pupils are equal, round, reactive to light and accommodation. Extraocular muscles are intact. Neck: Showed no JVD, no carotid bruit . Lungs: Clear to auscultation bilaterally. Heart: Regular without any murmur. Abdomen: Soft, nontender. No hepatosplenomegaly. Surgery for laparoscopy is healing well. Extremities: Lower extremities show +1 edema on the left and trace on the right, pressure sore on medial aspect of right heel - completely healed. No clubbing, or cyanosis. Breasts: Examination not done today. Neuro exam: intact cranial nerves bilaterally, she continues to have weakness and numbness in both lower extremities but continued improvement compared to last exam Lymphatic: no adenopathy appreciated in the supraclavicular, axillary, cervical or inguinal area  Skin: grade 1 bedsore in sacral area    REVIEW OF LABORATORY DATA:   Lab Results   Component Value Date    WBC 3.4 (L) 08/13/2019    HGB 10.6 (L) 08/13/2019    HCT 31.1 (L) 08/13/2019    MCV 84.7 08/13/2019     08/13/2019     Lab Results   Component Value Date    NEUTROABS 2.30 08/13/2019         Chemistry        Component Value Date/Time     08/13/2019 1110    K 4.1 08/13/2019 1110     08/13/2019 1110    CO2 26 08/13/2019 1110    BUN 13 08/13/2019 1110    CREATININE 0.42 (L) 08/13/2019 1110        Component Value Date/Time    CALCIUM 9.7 08/13/2019 1110    ALKPHOS 64 08/13/2019 1110    AST 19 08/13/2019 1110    ALT 20 08/13/2019 1110    BILITOT 0.34 08/13/2019 1110              PATHOLOGY:     REVIEW OF RADIOLOGICAL RESULTS:     PATHOLOGY:       IMPRESSION:   1. Invasive ductal adenocarcinoma of the pancreas, stage III  2. Uche adjuvant FOLFIRINOX started 12/04/2018  3. Liver lesions found, removed during surgery 05/2019 and pathology confirmed metastatic disease  4. Palliative FOLFIRI started 07/2019  5. Considering SBRT to primary tumor     PLAN:   1.  We reviewed her lab work, her counts are in range for

## 2019-08-13 NOTE — PROGRESS NOTES
Pt here for C3D1 Folfiri. Denies any new complaints. Labs drawn from port and results reviewed. Pt seen by Dr Ana Paula Lopez at chair side for follow up, refer to his note. Pt was treated without incident and d/c'd in stable condition. Pt will return on 8-15-19 for pump dc.

## 2019-08-15 ENCOUNTER — HOSPITAL ENCOUNTER (OUTPATIENT)
Dept: INFUSION THERAPY | Age: 59
Discharge: HOME | End: 2019-08-15
Payer: COMMERCIAL

## 2019-08-15 ENCOUNTER — TELEPHONE (OUTPATIENT)
Dept: PALLATIVE CARE | Age: 59
End: 2019-08-15

## 2019-08-15 DIAGNOSIS — C25.9 PANCREATIC ADENOCARCINOMA (HCC): Primary | ICD-10-CM

## 2019-08-15 PROCEDURE — 96374 THER/PROPH/DIAG INJ IV PUSH: CPT

## 2019-08-15 PROCEDURE — 96377 APPLICATON ON-BODY INJECTOR: CPT

## 2019-08-15 PROCEDURE — 6360000002 HC RX W HCPCS: Performed by: INTERNAL MEDICINE

## 2019-08-15 PROCEDURE — 2580000003 HC RX 258: Performed by: INTERNAL MEDICINE

## 2019-08-15 PROCEDURE — 96375 TX/PRO/DX INJ NEW DRUG ADDON: CPT

## 2019-08-15 PROCEDURE — 96361 HYDRATE IV INFUSION ADD-ON: CPT

## 2019-08-15 PROCEDURE — 96360 HYDRATION IV INFUSION INIT: CPT

## 2019-08-15 RX ORDER — SODIUM CHLORIDE 0.9 % (FLUSH) 0.9 %
10 SYRINGE (ML) INJECTION PRN
Status: CANCELLED | OUTPATIENT
Start: 2019-08-15

## 2019-08-15 RX ORDER — HEPARIN SODIUM (PORCINE) LOCK FLUSH IV SOLN 100 UNIT/ML 100 UNIT/ML
500 SOLUTION INTRAVENOUS PRN
Status: CANCELLED | OUTPATIENT
Start: 2019-08-15

## 2019-08-15 RX ORDER — SODIUM CHLORIDE 0.9 % (FLUSH) 0.9 %
10 SYRINGE (ML) INJECTION PRN
Status: DISCONTINUED | OUTPATIENT
Start: 2019-08-15 | End: 2019-08-16 | Stop reason: HOSPADM

## 2019-08-15 RX ORDER — DEXAMETHASONE SODIUM PHOSPHATE 10 MG/ML
10 INJECTION INTRAMUSCULAR; INTRAVENOUS ONCE
Status: COMPLETED | OUTPATIENT
Start: 2019-08-15 | End: 2019-08-15

## 2019-08-15 RX ORDER — PROMETHAZINE HYDROCHLORIDE 25 MG/ML
6.25 INJECTION, SOLUTION INTRAMUSCULAR; INTRAVENOUS ONCE
Status: COMPLETED | OUTPATIENT
Start: 2019-08-15 | End: 2019-08-15

## 2019-08-15 RX ORDER — 0.9 % SODIUM CHLORIDE 0.9 %
1000 INTRAVENOUS SOLUTION INTRAVENOUS ONCE
Status: COMPLETED | OUTPATIENT
Start: 2019-08-15 | End: 2019-08-15

## 2019-08-15 RX ORDER — SODIUM CHLORIDE 0.9 % (FLUSH) 0.9 %
20 SYRINGE (ML) INJECTION PRN
Status: CANCELLED | OUTPATIENT
Start: 2019-08-15

## 2019-08-15 RX ORDER — HEPARIN SODIUM (PORCINE) LOCK FLUSH IV SOLN 100 UNIT/ML 100 UNIT/ML
500 SOLUTION INTRAVENOUS PRN
Status: DISCONTINUED | OUTPATIENT
Start: 2019-08-15 | End: 2019-08-16 | Stop reason: HOSPADM

## 2019-08-15 RX ADMIN — PROMETHAZINE HYDROCHLORIDE 6.25 MG: 25 INJECTION INTRAMUSCULAR; INTRAVENOUS at 15:27

## 2019-08-15 RX ADMIN — Medication 500 UNITS: at 16:30

## 2019-08-15 RX ADMIN — DEXAMETHASONE SODIUM PHOSPHATE 10 MG: 10 INJECTION INTRAMUSCULAR; INTRAVENOUS at 15:27

## 2019-08-15 RX ADMIN — PEGFILGRASTIM 6 MG: KIT SUBCUTANEOUS at 16:31

## 2019-08-15 RX ADMIN — SODIUM CHLORIDE 1000 ML: 9 INJECTION, SOLUTION INTRAVENOUS at 14:30

## 2019-08-15 RX ADMIN — SODIUM CHLORIDE, PRESERVATIVE FREE 10 ML: 5 INJECTION INTRAVENOUS at 16:30

## 2019-08-15 NOTE — PROGRESS NOTES
Pt arrives via w/c for C.3D.3 pump d/c and Neulasta OBI. C/o N/V since yesterday. Pt vomited x 2 while in infusion area. Discussed with Dr. Jeb Lomeli, order rec'd for 1 L NS bolus, Dex 10mg IV and Phenergan 6.25mg IV now. Also, pt to restart her Zyprexa 5mg (2 tabs) nightly at home and attempt to get Sancuso patch for pt as well. Pt to continue Zofran prn until she has Sancuso patch then stop. Pt to stop Scopalomine patch once Sancuso patch available as well. Sancuso benefit verification form/script completed, signed by MD and faxed to [phone]. Script was left in scanning for chart. Pt given hydration without incident and reported some relief once complete. Pt and her  were encouraged to call the office tomorrow am if pt not feeling any better.

## 2019-08-16 ENCOUNTER — TELEPHONE (OUTPATIENT)
Dept: INFUSION THERAPY | Age: 59
End: 2019-08-16

## 2019-08-16 RX ORDER — OLANZAPINE 2.5 MG/1
5 TABLET ORAL NIGHTLY
Qty: 30 TABLET | Refills: 0 | Status: SHIPPED
Start: 2019-08-16 | End: 2020-01-21 | Stop reason: SDUPTHER

## 2019-08-16 NOTE — TELEPHONE ENCOUNTER
Attempted fax x 3 for Sancuso patch yesterday afternoon, but did not go through. Re-attempted fax again this am at 8:48 am at [phone] and fax confirmation was rec'd. Also spoke with Jelena Haley benefit rep and she verified did receive fax.  Shantal to call [facility] office with update on medication/approval.

## 2019-08-27 ENCOUNTER — OFFICE VISIT (OUTPATIENT)
Dept: ONCOLOGY | Age: 59
End: 2019-08-27
Payer: COMMERCIAL

## 2019-08-27 ENCOUNTER — TELEPHONE (OUTPATIENT)
Dept: ONCOLOGY | Age: 59
End: 2019-08-27

## 2019-08-27 ENCOUNTER — HOSPITAL ENCOUNTER (OUTPATIENT)
Dept: INFUSION THERAPY | Age: 59
Discharge: HOME | End: 2019-08-27
Payer: COMMERCIAL

## 2019-08-27 VITALS
DIASTOLIC BLOOD PRESSURE: 76 MMHG | WEIGHT: 100.6 LBS | HEART RATE: 79 BPM | RESPIRATION RATE: 17 BRPM | SYSTOLIC BLOOD PRESSURE: 122 MMHG | TEMPERATURE: 97.8 F | BODY MASS INDEX: 15.76 KG/M2

## 2019-08-27 VITALS
SYSTOLIC BLOOD PRESSURE: 122 MMHG | HEART RATE: 79 BPM | WEIGHT: 100.6 LBS | TEMPERATURE: 97.6 F | BODY MASS INDEX: 15.76 KG/M2 | DIASTOLIC BLOOD PRESSURE: 76 MMHG | RESPIRATION RATE: 17 BRPM

## 2019-08-27 DIAGNOSIS — C25.9 PANCREATIC ADENOCARCINOMA (HCC): ICD-10-CM

## 2019-08-27 DIAGNOSIS — C25.1 MALIGNANT NEOPLASM OF BODY OF PANCREAS (HCC): Primary | ICD-10-CM

## 2019-08-27 LAB
ABSOLUTE EOS #: 0.21 K/UL (ref 0–0.4)
ABSOLUTE IMMATURE GRANULOCYTE: ABNORMAL K/UL (ref 0–0.3)
ABSOLUTE LYMPH #: 0.95 K/UL (ref 1–4.8)
ABSOLUTE MONO #: 0.63 K/UL (ref 0.1–0.8)
ALBUMIN SERPL-MCNC: 3.9 G/DL (ref 3.5–5.2)
ALBUMIN/GLOBULIN RATIO: 1.7 (ref 1–2.5)
ALP BLD-CCNC: 91 U/L (ref 35–104)
ALT SERPL-CCNC: 10 U/L (ref 5–33)
ANION GAP SERPL CALCULATED.3IONS-SCNC: 11 MMOL/L (ref 9–17)
AST SERPL-CCNC: 12 U/L
BASOPHILS # BLD: 1 % (ref 0–2)
BASOPHILS ABSOLUTE: 0.11 K/UL (ref 0–0.2)
BILIRUB SERPL-MCNC: 0.26 MG/DL (ref 0.3–1.2)
BUN BLDV-MCNC: 13 MG/DL (ref 6–20)
BUN/CREAT BLD: ABNORMAL (ref 9–20)
CALCIUM SERPL-MCNC: 9.4 MG/DL (ref 8.6–10.4)
CHLORIDE BLD-SCNC: 103 MMOL/L (ref 98–107)
CO2: 27 MMOL/L (ref 20–31)
CREAT SERPL-MCNC: 0.5 MG/DL (ref 0.5–0.9)
DIFFERENTIAL TYPE: ABNORMAL
EOSINOPHILS RELATIVE PERCENT: 2 % (ref 1–4)
GFR AFRICAN AMERICAN: >60 ML/MIN
GFR NON-AFRICAN AMERICAN: >60 ML/MIN
GFR SERPL CREATININE-BSD FRML MDRD: ABNORMAL ML/MIN/{1.73_M2}
GFR SERPL CREATININE-BSD FRML MDRD: ABNORMAL ML/MIN/{1.73_M2}
GLUCOSE BLD-MCNC: 115 MG/DL (ref 70–99)
HCT VFR BLD CALC: 30 % (ref 36–46)
HEMOGLOBIN: 10.2 G/DL (ref 12–16)
IMMATURE GRANULOCYTES: ABNORMAL %
LYMPHOCYTES # BLD: 9 % (ref 24–44)
MCH RBC QN AUTO: 29.5 PG (ref 26–34)
MCHC RBC AUTO-ENTMCNC: 33.9 G/DL (ref 31–37)
MCV RBC AUTO: 87 FL (ref 80–100)
MONOCYTES # BLD: 6 % (ref 1–7)
MORPHOLOGY: ABNORMAL
NRBC AUTOMATED: ABNORMAL PER 100 WBC
PDW BLD-RTO: 19 % (ref 12.5–15.4)
PLATELET # BLD: 156 K/UL (ref 140–450)
PLATELET ESTIMATE: ABNORMAL
PMV BLD AUTO: 8.9 FL (ref 6–12)
POTASSIUM SERPL-SCNC: 3.8 MMOL/L (ref 3.7–5.3)
RBC # BLD: 3.45 M/UL (ref 4–5.2)
RBC # BLD: ABNORMAL 10*6/UL
SEG NEUTROPHILS: 82 % (ref 36–66)
SEGMENTED NEUTROPHILS ABSOLUTE COUNT: 8.6 K/UL (ref 1.8–7.7)
SODIUM BLD-SCNC: 141 MMOL/L (ref 135–144)
TOTAL PROTEIN: 6.2 G/DL (ref 6.4–8.3)
WBC # BLD: 10.5 K/UL (ref 3.5–11)
WBC # BLD: ABNORMAL 10*3/UL

## 2019-08-27 PROCEDURE — 85025 COMPLETE CBC W/AUTO DIFF WBC: CPT

## 2019-08-27 PROCEDURE — 96411 CHEMO IV PUSH ADDL DRUG: CPT

## 2019-08-27 PROCEDURE — 96413 CHEMO IV INFUSION 1 HR: CPT

## 2019-08-27 PROCEDURE — 96416 CHEMO PROLONG INFUSE W/PUMP: CPT

## 2019-08-27 PROCEDURE — 36591 DRAW BLOOD OFF VENOUS DEVICE: CPT

## 2019-08-27 PROCEDURE — 96415 CHEMO IV INFUSION ADDL HR: CPT

## 2019-08-27 PROCEDURE — 96409 CHEMO IV PUSH SNGL DRUG: CPT

## 2019-08-27 PROCEDURE — 96366 THER/PROPH/DIAG IV INF ADDON: CPT

## 2019-08-27 PROCEDURE — 96375 TX/PRO/DX INJ NEW DRUG ADDON: CPT

## 2019-08-27 PROCEDURE — 96368 THER/DIAG CONCURRENT INF: CPT

## 2019-08-27 PROCEDURE — 2580000003 HC RX 258: Performed by: INTERNAL MEDICINE

## 2019-08-27 PROCEDURE — 99215 OFFICE O/P EST HI 40 MIN: CPT | Performed by: INTERNAL MEDICINE

## 2019-08-27 PROCEDURE — 80053 COMPREHEN METABOLIC PANEL: CPT

## 2019-08-27 PROCEDURE — 6360000002 HC RX W HCPCS: Performed by: INTERNAL MEDICINE

## 2019-08-27 RX ORDER — DEXTROSE MONOHYDRATE 50 MG/ML
INJECTION, SOLUTION INTRAVENOUS ONCE
Status: DISCONTINUED | OUTPATIENT
Start: 2019-08-27 | End: 2019-08-28 | Stop reason: HOSPADM

## 2019-08-27 RX ORDER — SODIUM CHLORIDE 9 MG/ML
INJECTION, SOLUTION INTRAVENOUS CONTINUOUS
Status: CANCELLED | OUTPATIENT
Start: 2019-09-17

## 2019-08-27 RX ORDER — FLUOROURACIL 50 MG/ML
400 INJECTION, SOLUTION INTRAVENOUS ONCE
Status: CANCELLED | OUTPATIENT
Start: 2019-09-17

## 2019-08-27 RX ORDER — EPINEPHRINE 1 MG/ML
0.3 INJECTION, SOLUTION, CONCENTRATE INTRAVENOUS PRN
Status: CANCELLED | OUTPATIENT
Start: 2019-09-17

## 2019-08-27 RX ORDER — 0.9 % SODIUM CHLORIDE 0.9 %
10 VIAL (ML) INJECTION ONCE
Status: CANCELLED | OUTPATIENT
Start: 2019-09-17

## 2019-08-27 RX ORDER — FLUOROURACIL 50 MG/ML
600 INJECTION, SOLUTION INTRAVENOUS ONCE
Status: COMPLETED | OUTPATIENT
Start: 2019-08-27 | End: 2019-08-27

## 2019-08-27 RX ORDER — GRANISETRON 3.1 MG/24H
PATCH TRANSDERMAL
COMMUNITY
Start: 2019-08-16 | End: 2019-11-19 | Stop reason: ALTCHOICE

## 2019-08-27 RX ORDER — ATROPINE SULFATE 0.4 MG/ML
0.4 AMPUL (ML) INJECTION
Status: CANCELLED | OUTPATIENT
Start: 2019-09-17

## 2019-08-27 RX ORDER — DEXAMETHASONE SODIUM PHOSPHATE 10 MG/ML
10 INJECTION INTRAMUSCULAR; INTRAVENOUS ONCE
Status: COMPLETED | OUTPATIENT
Start: 2019-08-27 | End: 2019-08-27

## 2019-08-27 RX ORDER — PALONOSETRON 0.05 MG/ML
0.25 INJECTION, SOLUTION INTRAVENOUS ONCE
Status: COMPLETED | OUTPATIENT
Start: 2019-08-27 | End: 2019-08-27

## 2019-08-27 RX ORDER — METHYLPREDNISOLONE SODIUM SUCCINATE 125 MG/2ML
125 INJECTION, POWDER, LYOPHILIZED, FOR SOLUTION INTRAMUSCULAR; INTRAVENOUS ONCE
Status: CANCELLED | OUTPATIENT
Start: 2019-09-17

## 2019-08-27 RX ORDER — ATROPINE SULFATE 0.4 MG/ML
0.4 AMPUL (ML) INJECTION
Status: COMPLETED | OUTPATIENT
Start: 2019-08-27 | End: 2019-08-27

## 2019-08-27 RX ORDER — DEXTROSE MONOHYDRATE 50 MG/ML
INJECTION, SOLUTION INTRAVENOUS ONCE
Status: CANCELLED | OUTPATIENT
Start: 2019-09-17

## 2019-08-27 RX ORDER — HEPARIN SODIUM (PORCINE) LOCK FLUSH IV SOLN 100 UNIT/ML 100 UNIT/ML
500 SOLUTION INTRAVENOUS PRN
Status: CANCELLED | OUTPATIENT
Start: 2019-09-17

## 2019-08-27 RX ORDER — PALONOSETRON 0.05 MG/ML
0.25 INJECTION, SOLUTION INTRAVENOUS ONCE
Status: CANCELLED | OUTPATIENT
Start: 2019-09-17

## 2019-08-27 RX ORDER — SODIUM CHLORIDE 0.9 % (FLUSH) 0.9 %
10 SYRINGE (ML) INJECTION PRN
Status: CANCELLED | OUTPATIENT
Start: 2019-09-17

## 2019-08-27 RX ORDER — DIPHENHYDRAMINE HYDROCHLORIDE 50 MG/ML
50 INJECTION INTRAMUSCULAR; INTRAVENOUS ONCE
Status: CANCELLED | OUTPATIENT
Start: 2019-09-17

## 2019-08-27 RX ORDER — SODIUM CHLORIDE 0.9 % (FLUSH) 0.9 %
10 SYRINGE (ML) INJECTION PRN
Status: DISCONTINUED | OUTPATIENT
Start: 2019-08-27 | End: 2019-08-28 | Stop reason: HOSPADM

## 2019-08-27 RX ORDER — SODIUM CHLORIDE 0.9 % (FLUSH) 0.9 %
5 SYRINGE (ML) INJECTION PRN
Status: CANCELLED | OUTPATIENT
Start: 2019-09-17

## 2019-08-27 RX ORDER — HEPARIN SODIUM (PORCINE) LOCK FLUSH IV SOLN 100 UNIT/ML 100 UNIT/ML
500 SOLUTION INTRAVENOUS PRN
Status: DISCONTINUED | OUTPATIENT
Start: 2019-08-27 | End: 2019-08-28 | Stop reason: HOSPADM

## 2019-08-27 RX ADMIN — DEXTROSE MONOHYDRATE 140 MG: 50 INJECTION, SOLUTION INTRAVENOUS at 10:34

## 2019-08-27 RX ADMIN — FLUOROURACIL 3550 MG: 50 INJECTION, SOLUTION INTRAVENOUS at 12:16

## 2019-08-27 RX ADMIN — PALONOSETRON HYDROCHLORIDE 0.25 MG: 0.25 INJECTION, SOLUTION INTRAVENOUS at 09:38

## 2019-08-27 RX ADMIN — SODIUM CHLORIDE 150 MG: 900 INJECTION, SOLUTION INTRAVENOUS at 09:59

## 2019-08-27 RX ADMIN — DEXTROSE MONOHYDRATE: 50 INJECTION, SOLUTION INTRAVENOUS at 09:36

## 2019-08-27 RX ADMIN — FLUOROURACIL 600 MG: 50 INJECTION, SOLUTION INTRAVENOUS at 12:16

## 2019-08-27 RX ADMIN — DEXAMETHASONE SODIUM PHOSPHATE 10 MG: 10 INJECTION INTRAMUSCULAR; INTRAVENOUS at 09:38

## 2019-08-27 RX ADMIN — SODIUM CHLORIDE, PRESERVATIVE FREE 10 ML: 5 INJECTION INTRAVENOUS at 08:35

## 2019-08-27 RX ADMIN — ATROPINE SULFATE 0.4 MG: 0.4 INJECTION, SOLUTION INTRAMUSCULAR; INTRAVENOUS; SUBCUTANEOUS at 10:31

## 2019-08-27 ASSESSMENT — PAIN DESCRIPTION - FREQUENCY: FREQUENCY: INTERMITTENT

## 2019-08-27 ASSESSMENT — PAIN DESCRIPTION - LOCATION: LOCATION: ABDOMEN

## 2019-08-27 ASSESSMENT — PAIN DESCRIPTION - ORIENTATION: ORIENTATION: LEFT

## 2019-08-27 ASSESSMENT — PAIN DESCRIPTION - DESCRIPTORS: DESCRIPTORS: DISCOMFORT;SHARP

## 2019-08-27 NOTE — PROGRESS NOTES
Patient here for C4D1 Irinotecan/leucovorin/5FU. Labs drawn from port and reviewed. Patient saw Dr. Lizz Odell today see his dictation. Blood return noted before during and after 5FU push. Patient tolerated treatment well and was discharged home with spouse in stable condition. Patient to return 8/29 for Hydration/Nuelasta and pump dc.

## 2019-08-27 NOTE — PROGRESS NOTES
cyanosis. Breasts: Examination not done today. Neuro exam: intact cranial nerves bilaterally, she continues to have weakness and numbness in both lower extremities but continued improvement compared to last exam Lymphatic: no adenopathy appreciated in the supraclavicular, axillary, cervical or inguinal area  Skin: grade 1 bedsore in sacral area    REVIEW OF LABORATORY DATA:   Lab Results   Component Value Date    WBC 10.5 08/27/2019    HGB 10.2 (L) 08/27/2019    HCT 30.0 (L) 08/27/2019    MCV 87.0 08/27/2019     08/27/2019     Lab Results   Component Value Date    NEUTROABS PENDING 08/27/2019         Chemistry        Component Value Date/Time     08/13/2019 1110    K 4.1 08/13/2019 1110     08/13/2019 1110    CO2 26 08/13/2019 1110    BUN 13 08/13/2019 1110    CREATININE 0.42 (L) 08/13/2019 1110        Component Value Date/Time    CALCIUM 9.7 08/13/2019 1110    ALKPHOS 64 08/13/2019 1110    AST 19 08/13/2019 1110    ALT 20 08/13/2019 1110    BILITOT 0.34 08/13/2019 1110        Results for Britt Ballard \"MAIRA\" (MRN [id number]) as of 8/27/2019 09:39   Ref. Range 1/2/2019 10:16 2/26/2019 10:04 4/9/2019 09:53 6/13/2019 14:38 7/30/2019 11:24   CA 19-9 Latest Ref Range: 0 - 35 U/mL 2620 (H) 1821 (H) 1532 (H) 4965 (H) 25024 (H)         PATHOLOGY:     REVIEW OF RADIOLOGICAL RESULTS:     PATHOLOGY:       IMPRESSION:   1. Invasive ductal adenocarcinoma of the pancreas, stage III  2. Uche adjuvant FOLFIRINOX started 12/04/2018  3. Liver lesions found, removed during surgery 05/2019 and pathology confirmed metastatic disease  4. Palliative FOLFIRI started 07/2019  5. Considering SBRT to primary tumor     PLAN:   1. We reviewed her lab work, her  continues to rise, her other counts are stable. 2. I completed toxicity check - nausea persists. 3. We discussed managing nausea with this cycle with her Scopolamine patch and other support medications.    4. We reviewed in detail her Tempus test results and

## 2019-08-27 NOTE — PROGRESS NOTES
H&P Update:  Ignacio Ear was seen and briefly examined. History and physical has been reviewed.   There have been no significant clinical changes since the completion of the originally dated History and Physical.    Signed By: Sandy Guerrero MD     July 18, 2017 3:25 PM NUTRITION NOTE    Pt reports feeling well, weight has been stable. Tolerating a minimal/low fat diet \"fairly well. \" Pt reports still having some bowel issues but indicates taking Creon with meals consistently has helped with urgency and frequency. No nutrition questions at this time.    Mariaelena Gallegos RD, LD  Registered Dietitian  [facility]: [phone] // C: [phone]

## 2019-08-27 NOTE — PATIENT INSTRUCTIONS
Proceed with chemo per orders. Need CT scan in 2 weeks, rv in 3 weeks possible chemo and labs.   Delay next chemo by one week

## 2019-08-27 NOTE — TELEPHONE ENCOUNTER
Proceed with chemo per orders. Need CT scan in 2 weeks, rv in 3 weeks possible chemo and labs. Delay next chemo by one week       Pt instructions not completed prior to pt leaving. I called pt to give new schedule no answer message left. Pt will return to office on 8/29/19 for pump off new schedule to be given. CT scheduled 9/10/19 arriving at [facility] at [address] for port access and CT to follow.   Chemo delay 1 week also made MD visit on the same day prior to possible tx scheduled 9/17/19 @9am with MD.

## 2019-08-29 ENCOUNTER — HOSPITAL ENCOUNTER (OUTPATIENT)
Dept: INFUSION THERAPY | Age: 59
Discharge: HOME | End: 2019-08-29
Payer: COMMERCIAL

## 2019-08-29 DIAGNOSIS — C25.9 PANCREATIC ADENOCARCINOMA (HCC): Primary | ICD-10-CM

## 2019-08-29 PROCEDURE — 96372 THER/PROPH/DIAG INJ SC/IM: CPT

## 2019-08-29 PROCEDURE — 6360000002 HC RX W HCPCS: Performed by: INTERNAL MEDICINE

## 2019-08-29 PROCEDURE — 96523 IRRIG DRUG DELIVERY DEVICE: CPT

## 2019-08-29 PROCEDURE — 2580000003 HC RX 258: Performed by: INTERNAL MEDICINE

## 2019-08-29 RX ORDER — HEPARIN SODIUM (PORCINE) LOCK FLUSH IV SOLN 100 UNIT/ML 100 UNIT/ML
500 SOLUTION INTRAVENOUS PRN
Status: DISCONTINUED | OUTPATIENT
Start: 2019-08-29 | End: 2019-08-30 | Stop reason: HOSPADM

## 2019-08-29 RX ORDER — SODIUM CHLORIDE 0.9 % (FLUSH) 0.9 %
10 SYRINGE (ML) INJECTION PRN
Status: DISCONTINUED | OUTPATIENT
Start: 2019-08-29 | End: 2019-08-30 | Stop reason: HOSPADM

## 2019-08-29 RX ORDER — HEPARIN SODIUM (PORCINE) LOCK FLUSH IV SOLN 100 UNIT/ML 100 UNIT/ML
500 SOLUTION INTRAVENOUS PRN
Status: CANCELLED | OUTPATIENT
Start: 2019-08-29

## 2019-08-29 RX ORDER — SODIUM CHLORIDE 0.9 % (FLUSH) 0.9 %
20 SYRINGE (ML) INJECTION PRN
Status: CANCELLED | OUTPATIENT
Start: 2019-08-29

## 2019-08-29 RX ORDER — SODIUM CHLORIDE 0.9 % (FLUSH) 0.9 %
10 SYRINGE (ML) INJECTION PRN
Status: CANCELLED | OUTPATIENT
Start: 2019-08-29

## 2019-08-29 RX ADMIN — Medication 500 UNITS: at 11:40

## 2019-08-29 RX ADMIN — Medication 10 ML: at 11:40

## 2019-08-29 RX ADMIN — PEGFILGRASTIM 6 MG: KIT SUBCUTANEOUS at 11:43

## 2019-08-29 NOTE — PROGRESS NOTES
Pt here for pump off and neulasta. Denies any problems and states she does not need hydration. Will return 9/17 for Dr visit and treatment.

## 2019-09-03 ENCOUNTER — TELEPHONE (OUTPATIENT)
Dept: ONCOLOGY | Age: 59
End: 2019-09-03

## 2019-09-03 DIAGNOSIS — T45.1X5A CINV (CHEMOTHERAPY-INDUCED NAUSEA AND VOMITING): ICD-10-CM

## 2019-09-03 DIAGNOSIS — F41.9 ANXIETY: ICD-10-CM

## 2019-09-03 DIAGNOSIS — C25.1 MALIGNANT NEOPLASM OF BODY OF PANCREAS (HCC): ICD-10-CM

## 2019-09-03 DIAGNOSIS — K52.1 CHEMOTHERAPY INDUCED DIARRHEA: ICD-10-CM

## 2019-09-03 DIAGNOSIS — T45.1X5A CHEMOTHERAPY INDUCED DIARRHEA: ICD-10-CM

## 2019-09-03 DIAGNOSIS — R11.2 CINV (CHEMOTHERAPY-INDUCED NAUSEA AND VOMITING): ICD-10-CM

## 2019-09-03 RX ORDER — OXYCODONE HYDROCHLORIDE 5 MG/1
5 TABLET ORAL EVERY 4 HOURS PRN
Qty: 120 TABLET | Refills: 0 | Status: SHIPPED | OUTPATIENT
Start: 2019-09-03 | End: 2019-09-17

## 2019-09-10 ENCOUNTER — HOSPITAL ENCOUNTER (OUTPATIENT)
Dept: CT IMAGING | Age: 59
Discharge: HOME | End: 2019-09-12
Payer: COMMERCIAL

## 2019-09-10 ENCOUNTER — TELEPHONE (OUTPATIENT)
Dept: ONCOLOGY | Age: 59
End: 2019-09-10

## 2019-09-10 DIAGNOSIS — C25.1 MALIGNANT NEOPLASM OF BODY OF PANCREAS (HCC): ICD-10-CM

## 2019-09-10 PROCEDURE — 2580000003 HC RX 258: Performed by: INTERNAL MEDICINE

## 2019-09-10 PROCEDURE — 74160 CT ABDOMEN W/CONTRAST: CPT

## 2019-09-10 PROCEDURE — 6360000004 HC RX CONTRAST MEDICATION: Performed by: INTERNAL MEDICINE

## 2019-09-10 RX ORDER — 0.9 % SODIUM CHLORIDE 0.9 %
80 INTRAVENOUS SOLUTION INTRAVENOUS ONCE
Status: COMPLETED | OUTPATIENT
Start: 2019-09-10 | End: 2019-09-10

## 2019-09-10 RX ORDER — SODIUM CHLORIDE 0.9 % (FLUSH) 0.9 %
10 SYRINGE (ML) INJECTION PRN
Status: DISCONTINUED | OUTPATIENT
Start: 2019-09-10 | End: 2019-09-13 | Stop reason: HOSPADM

## 2019-09-10 RX ADMIN — IOVERSOL 100 ML: 741 INJECTION INTRA-ARTERIAL; INTRAVENOUS at 14:35

## 2019-09-10 RX ADMIN — Medication 10 ML: at 14:34

## 2019-09-10 RX ADMIN — SODIUM CHLORIDE 80 ML: 9 INJECTION, SOLUTION INTRAVENOUS at 14:34

## 2019-09-10 RX ADMIN — IOHEXOL 50 ML: 240 INJECTION, SOLUTION INTRATHECAL; INTRAVASCULAR; INTRAVENOUS; ORAL at 14:35

## 2019-09-10 NOTE — TELEPHONE ENCOUNTER
PER REQUEST OF TERE SHANKS RN NURSE NAVIGATOR, I CALLED [facility] RADIOLOGY, AFTER MAKING SURE PATIENT SHOWED FOR CT SCANS, TO HAVE THE IMAGES SENT TO [facility]. I SPOKE TO DIONY AND SHE SENT THE IMAGES. TERE NOTIFIED.

## 2019-09-12 ENCOUNTER — TELEPHONE (OUTPATIENT)
Dept: ONCOLOGY | Age: 59
End: 2019-09-12

## 2019-09-13 ENCOUNTER — TELEPHONE (OUTPATIENT)
Dept: ONCOLOGY | Age: 59
End: 2019-09-13

## 2019-09-13 NOTE — TELEPHONE ENCOUNTER
Name: Baylee Disla  : 1960  MRN: [id number]    Oncology Navigation Follow-Up Note    Contact Type:  Telephone  Notes: Pt left VM requesting 9/10/19 CT abd/pelvis results. Spoke with Dr. Richard Ching via telephone, updated on VM. Dr. Richard Ching stated okay to notify pt CT imaging showing progression of disease, Dr. Richard Ching spoke with Dr. Hiro Hernández [facility]) yesterday, & will discuss next line of tx @ 19 f/u. Spoke with pt & pt's spouse Froilan Cerrato, updated on conversation with Dr. Richard Ching. Encouraged to write any questions down & bring to f/u. Pt verbalized understanding & thanked writer. Support given. Will continue to follow.     Electronically signed by Joesph Pierre RN on 2019 at 1:07 PM

## 2019-09-17 ENCOUNTER — HOSPITAL ENCOUNTER (OUTPATIENT)
Dept: INFUSION THERAPY | Age: 59
Discharge: HOME | End: 2019-09-17
Payer: COMMERCIAL

## 2019-09-17 ENCOUNTER — TELEPHONE (OUTPATIENT)
Dept: ONCOLOGY | Age: 59
End: 2019-09-17

## 2019-09-17 ENCOUNTER — OFFICE VISIT (OUTPATIENT)
Dept: ONCOLOGY | Age: 59
End: 2019-09-17
Payer: COMMERCIAL

## 2019-09-17 VITALS
SYSTOLIC BLOOD PRESSURE: 111 MMHG | WEIGHT: 100.2 LBS | HEART RATE: 94 BPM | BODY MASS INDEX: 15.69 KG/M2 | DIASTOLIC BLOOD PRESSURE: 73 MMHG | TEMPERATURE: 98 F

## 2019-09-17 DIAGNOSIS — R11.2 CINV (CHEMOTHERAPY-INDUCED NAUSEA AND VOMITING): ICD-10-CM

## 2019-09-17 DIAGNOSIS — C25.1 MALIGNANT NEOPLASM OF BODY OF PANCREAS (HCC): Primary | ICD-10-CM

## 2019-09-17 DIAGNOSIS — T45.1X5A CINV (CHEMOTHERAPY-INDUCED NAUSEA AND VOMITING): ICD-10-CM

## 2019-09-17 DIAGNOSIS — C25.1 MALIGNANT NEOPLASM OF BODY OF PANCREAS (HCC): ICD-10-CM

## 2019-09-17 LAB
ABSOLUTE EOS #: 0.13 K/UL (ref 0–0.4)
ABSOLUTE IMMATURE GRANULOCYTE: ABNORMAL K/UL (ref 0–0.3)
ABSOLUTE LYMPH #: 0.87 K/UL (ref 1–4.8)
ABSOLUTE MONO #: 0.54 K/UL (ref 0.1–1.2)
ALBUMIN SERPL-MCNC: 3.7 G/DL (ref 3.5–5.2)
ALBUMIN/GLOBULIN RATIO: 1.4 (ref 1–2.5)
ALP BLD-CCNC: 81 U/L (ref 35–104)
ALT SERPL-CCNC: 13 U/L (ref 5–33)
ANION GAP SERPL CALCULATED.3IONS-SCNC: 11 MMOL/L (ref 9–17)
AST SERPL-CCNC: 16 U/L
BASOPHILS # BLD: 1 % (ref 0–2)
BASOPHILS ABSOLUTE: 0.07 K/UL (ref 0–0.2)
BILIRUB SERPL-MCNC: 0.4 MG/DL (ref 0.3–1.2)
BUN BLDV-MCNC: 13 MG/DL (ref 6–20)
BUN/CREAT BLD: ABNORMAL (ref 9–20)
CALCIUM SERPL-MCNC: 9.5 MG/DL (ref 8.6–10.4)
CHLORIDE BLD-SCNC: 101 MMOL/L (ref 98–107)
CO2: 28 MMOL/L (ref 20–31)
CREAT SERPL-MCNC: 0.5 MG/DL (ref 0.5–0.9)
DIFFERENTIAL TYPE: ABNORMAL
EOSINOPHILS RELATIVE PERCENT: 2 % (ref 1–4)
GFR AFRICAN AMERICAN: >60 ML/MIN
GFR NON-AFRICAN AMERICAN: >60 ML/MIN
GFR SERPL CREATININE-BSD FRML MDRD: ABNORMAL ML/MIN/{1.73_M2}
GFR SERPL CREATININE-BSD FRML MDRD: ABNORMAL ML/MIN/{1.73_M2}
GLUCOSE BLD-MCNC: 148 MG/DL (ref 70–99)
HCT VFR BLD CALC: 29.8 % (ref 36–46)
HEMOGLOBIN: 9.7 G/DL (ref 12–16)
IMMATURE GRANULOCYTES: ABNORMAL %
LYMPHOCYTES # BLD: 13 % (ref 24–44)
MCH RBC QN AUTO: 29.2 PG (ref 26–34)
MCHC RBC AUTO-ENTMCNC: 32.5 G/DL (ref 31–37)
MCV RBC AUTO: 89.7 FL (ref 80–100)
MONOCYTES # BLD: 8 % (ref 2–11)
MORPHOLOGY: ABNORMAL
NRBC AUTOMATED: ABNORMAL PER 100 WBC
PDW BLD-RTO: 23.3 % (ref 12.5–15.4)
PLATELET # BLD: 219 K/UL (ref 140–450)
PLATELET ESTIMATE: ABNORMAL
PMV BLD AUTO: 8.2 FL (ref 6–12)
POTASSIUM SERPL-SCNC: 4.3 MMOL/L (ref 3.7–5.3)
RBC # BLD: 3.32 M/UL (ref 4–5.2)
RBC # BLD: ABNORMAL 10*6/UL
SEG NEUTROPHILS: 76 % (ref 36–66)
SEGMENTED NEUTROPHILS ABSOLUTE COUNT: 5.09 K/UL (ref 1.8–7.7)
SODIUM BLD-SCNC: 140 MMOL/L (ref 135–144)
TOTAL PROTEIN: 6.4 G/DL (ref 6.4–8.3)
WBC # BLD: 6.7 K/UL (ref 3.5–11)
WBC # BLD: ABNORMAL 10*3/UL

## 2019-09-17 PROCEDURE — 80053 COMPREHEN METABOLIC PANEL: CPT

## 2019-09-17 PROCEDURE — 85025 COMPLETE CBC W/AUTO DIFF WBC: CPT

## 2019-09-17 PROCEDURE — 99215 OFFICE O/P EST HI 40 MIN: CPT | Performed by: INTERNAL MEDICINE

## 2019-09-17 RX ORDER — EPINEPHRINE 1 MG/ML
0.3 INJECTION, SOLUTION, CONCENTRATE INTRAVENOUS PRN
Status: CANCELLED | OUTPATIENT
Start: 2019-10-01

## 2019-09-17 RX ORDER — SODIUM CHLORIDE 0.9 % (FLUSH) 0.9 %
5 SYRINGE (ML) INJECTION PRN
Status: CANCELLED | OUTPATIENT
Start: 2019-10-01

## 2019-09-17 RX ORDER — MEPERIDINE HYDROCHLORIDE 50 MG/ML
12.5 INJECTION INTRAMUSCULAR; INTRAVENOUS; SUBCUTANEOUS ONCE
Status: CANCELLED | OUTPATIENT
Start: 2019-10-01

## 2019-09-17 RX ORDER — HEPARIN SODIUM (PORCINE) LOCK FLUSH IV SOLN 100 UNIT/ML 100 UNIT/ML
500 SOLUTION INTRAVENOUS PRN
Status: CANCELLED | OUTPATIENT
Start: 2019-10-08

## 2019-09-17 RX ORDER — METHYLPREDNISOLONE SODIUM SUCCINATE 125 MG/2ML
125 INJECTION, POWDER, LYOPHILIZED, FOR SOLUTION INTRAMUSCULAR; INTRAVENOUS ONCE
Status: CANCELLED | OUTPATIENT
Start: 2019-10-17

## 2019-09-17 RX ORDER — MEPERIDINE HYDROCHLORIDE 50 MG/ML
12.5 INJECTION INTRAMUSCULAR; INTRAVENOUS; SUBCUTANEOUS ONCE
Status: CANCELLED | OUTPATIENT
Start: 2019-10-08

## 2019-09-17 RX ORDER — PACLITAXEL 100 MG/20ML
125 INJECTION, POWDER, LYOPHILIZED, FOR SUSPENSION INTRAVENOUS ONCE
Status: CANCELLED | OUTPATIENT
Start: 2019-10-08

## 2019-09-17 RX ORDER — MEPERIDINE HYDROCHLORIDE 50 MG/ML
12.5 INJECTION INTRAMUSCULAR; INTRAVENOUS; SUBCUTANEOUS ONCE
Status: CANCELLED | OUTPATIENT
Start: 2019-10-17

## 2019-09-17 RX ORDER — SODIUM CHLORIDE 9 MG/ML
INJECTION, SOLUTION INTRAVENOUS CONTINUOUS
Status: CANCELLED | OUTPATIENT
Start: 2019-10-08

## 2019-09-17 RX ORDER — SODIUM CHLORIDE 9 MG/ML
20 INJECTION, SOLUTION INTRAVENOUS ONCE
Status: CANCELLED | OUTPATIENT
Start: 2019-10-17

## 2019-09-17 RX ORDER — OXYCODONE HYDROCHLORIDE 10 MG/1
10 TABLET ORAL EVERY 6 HOURS PRN
Qty: 120 TABLET | Refills: 0 | Status: SHIPPED | OUTPATIENT
Start: 2019-09-17 | End: 2019-10-17 | Stop reason: SDUPTHER

## 2019-09-17 RX ORDER — SODIUM CHLORIDE 0.9 % (FLUSH) 0.9 %
5 SYRINGE (ML) INJECTION PRN
Status: CANCELLED | OUTPATIENT
Start: 2019-10-17

## 2019-09-17 RX ORDER — METHYLPREDNISOLONE SODIUM SUCCINATE 125 MG/2ML
125 INJECTION, POWDER, LYOPHILIZED, FOR SOLUTION INTRAMUSCULAR; INTRAVENOUS ONCE
Status: CANCELLED | OUTPATIENT
Start: 2019-10-01

## 2019-09-17 RX ORDER — SODIUM CHLORIDE 9 MG/ML
INJECTION, SOLUTION INTRAVENOUS CONTINUOUS
Status: CANCELLED | OUTPATIENT
Start: 2019-10-01

## 2019-09-17 RX ORDER — PACLITAXEL 100 MG/20ML
125 INJECTION, POWDER, LYOPHILIZED, FOR SUSPENSION INTRAVENOUS ONCE
Status: CANCELLED | OUTPATIENT
Start: 2019-10-17

## 2019-09-17 RX ORDER — HEPARIN SODIUM (PORCINE) LOCK FLUSH IV SOLN 100 UNIT/ML 100 UNIT/ML
500 SOLUTION INTRAVENOUS PRN
Status: CANCELLED | OUTPATIENT
Start: 2019-10-17

## 2019-09-17 RX ORDER — HEPARIN SODIUM (PORCINE) LOCK FLUSH IV SOLN 100 UNIT/ML 100 UNIT/ML
500 SOLUTION INTRAVENOUS PRN
Status: CANCELLED | OUTPATIENT
Start: 2019-10-01

## 2019-09-17 RX ORDER — ONDANSETRON 2 MG/ML
4 INJECTION INTRAMUSCULAR; INTRAVENOUS EVERY 4 HOURS PRN
Status: CANCELLED
Start: 2019-10-01

## 2019-09-17 RX ORDER — SODIUM CHLORIDE 0.9 % (FLUSH) 0.9 %
5 SYRINGE (ML) INJECTION PRN
Status: CANCELLED | OUTPATIENT
Start: 2019-10-08

## 2019-09-17 RX ORDER — DIPHENHYDRAMINE HYDROCHLORIDE 50 MG/ML
50 INJECTION INTRAMUSCULAR; INTRAVENOUS ONCE
Status: CANCELLED | OUTPATIENT
Start: 2019-10-17

## 2019-09-17 RX ORDER — SODIUM CHLORIDE 9 MG/ML
INJECTION, SOLUTION INTRAVENOUS CONTINUOUS
Status: CANCELLED | OUTPATIENT
Start: 2019-10-17

## 2019-09-17 RX ORDER — DIPHENHYDRAMINE HYDROCHLORIDE 50 MG/ML
50 INJECTION INTRAMUSCULAR; INTRAVENOUS ONCE
Status: CANCELLED | OUTPATIENT
Start: 2019-10-01

## 2019-09-17 RX ORDER — ONDANSETRON 2 MG/ML
4 INJECTION INTRAMUSCULAR; INTRAVENOUS EVERY 4 HOURS PRN
Status: CANCELLED
Start: 2019-10-08

## 2019-09-17 RX ORDER — SODIUM CHLORIDE 0.9 % (FLUSH) 0.9 %
10 SYRINGE (ML) INJECTION PRN
Status: CANCELLED | OUTPATIENT
Start: 2019-10-17

## 2019-09-17 RX ORDER — EPINEPHRINE 1 MG/ML
0.3 INJECTION, SOLUTION, CONCENTRATE INTRAVENOUS PRN
Status: CANCELLED | OUTPATIENT
Start: 2019-10-17

## 2019-09-17 RX ORDER — SODIUM CHLORIDE 9 MG/ML
20 INJECTION, SOLUTION INTRAVENOUS ONCE
Status: CANCELLED | OUTPATIENT
Start: 2019-10-08

## 2019-09-17 RX ORDER — EPINEPHRINE 1 MG/ML
0.3 INJECTION, SOLUTION, CONCENTRATE INTRAVENOUS PRN
Status: CANCELLED | OUTPATIENT
Start: 2019-10-08

## 2019-09-17 RX ORDER — SODIUM CHLORIDE 9 MG/ML
20 INJECTION, SOLUTION INTRAVENOUS ONCE
Status: CANCELLED | OUTPATIENT
Start: 2019-10-01

## 2019-09-17 RX ORDER — OXYCODONE HCL 10 MG/1
10 TABLET, FILM COATED, EXTENDED RELEASE ORAL EVERY 12 HOURS
Qty: 60 TABLET | Refills: 0 | Status: SHIPPED | OUTPATIENT
Start: 2019-09-17 | End: 2019-10-21 | Stop reason: SDUPTHER

## 2019-09-17 RX ORDER — SODIUM CHLORIDE 0.9 % (FLUSH) 0.9 %
10 SYRINGE (ML) INJECTION PRN
Status: CANCELLED | OUTPATIENT
Start: 2019-10-08

## 2019-09-17 RX ORDER — SODIUM CHLORIDE 0.9 % (FLUSH) 0.9 %
10 SYRINGE (ML) INJECTION PRN
Status: CANCELLED | OUTPATIENT
Start: 2019-10-01

## 2019-09-17 RX ORDER — ONDANSETRON 2 MG/ML
4 INJECTION INTRAMUSCULAR; INTRAVENOUS EVERY 4 HOURS PRN
Status: CANCELLED
Start: 2019-10-17

## 2019-09-17 RX ORDER — METHYLPREDNISOLONE SODIUM SUCCINATE 125 MG/2ML
125 INJECTION, POWDER, LYOPHILIZED, FOR SOLUTION INTRAMUSCULAR; INTRAVENOUS ONCE
Status: CANCELLED | OUTPATIENT
Start: 2019-10-08

## 2019-09-17 RX ORDER — GABAPENTIN 300 MG/1
300 CAPSULE ORAL 3 TIMES DAILY
Qty: 90 CAPSULE | Refills: 3 | Status: SHIPPED | OUTPATIENT
Start: 2019-09-17 | End: 2020-01-21 | Stop reason: SDUPTHER

## 2019-09-17 RX ORDER — DIPHENHYDRAMINE HYDROCHLORIDE 50 MG/ML
50 INJECTION INTRAMUSCULAR; INTRAVENOUS ONCE
Status: CANCELLED | OUTPATIENT
Start: 2019-10-08

## 2019-09-17 NOTE — PROGRESS NOTES
constipation. Following cycle #3 she underwent celiac block, she experienced significant neuropathy and work up indicated cord infarction. She was released to rehab facility and is undergoing PT. CT done 01/22/2019 showed decrease in pancreatic mass. Decided to hold oxaliplatin for 2 cycles to allow neurological recovery, continue holding. She underwent surgery and liver lesions were found and removed, pathology confirmed metastatic disease. So surgery was aborted and she was sent back for chemotherapy. The patient was sent for second opinion and after consultation, they recommended continuing with chemotherapy with FOLFIRI since she had a good response radio logically and pathologically to it. The chemotherapy was resumed at the same preoperative dose but it was very poorly tolerated, she had chemotherapy-induced nausea and vomiting as well as diarrhea she had also severe pancytopenia. We decided to hold treatment at the recovery and then resume it with dose reduction, it was much better tolerated. Unfortunately, CT done 09/2019 showed progression. Herma Fothergill was consulted and recommendation for Gemcitabine based therapy was made, plan to proceed. She developed neuropathic pain in the mid-thoracic and we started her on Gabapentin. INTERIM HISTORY: The patient presents today for pancreatic cancer and to review PET and discuss treatment options. She has neuropathic pain on her mid-back bilaterally, she is taking Ibuprofen and Oxycodone but relief is short-lived. Her nausea is sporadic and controlled with medication. She continues to use Creon and feels her stool is improved. She has some post prandial pain with gurgling, likely tumor burden. PAST MEDICAL HISTORY: has a past medical history of Asthma, Cancer ([facility] [address] Congenital heart disease, Gastritis without bleeding, History of abnormal cervical Pap smear, Hyperlipidemia, Pressure ulcer of coccygeal region, stage 2, and Seasonal allergies.     PAST

## 2019-09-17 NOTE — PROGRESS NOTES
Patient seen byDr. Larry Gaines today. Appears he will place new chemo orders. New regimen to start in 2 weeks. Patient not seen in chemo/infusion.

## 2019-09-25 DIAGNOSIS — C25.1 MALIGNANT NEOPLASM OF BODY OF PANCREAS (HCC): Primary | ICD-10-CM

## 2019-10-01 ENCOUNTER — TELEPHONE (OUTPATIENT)
Dept: ONCOLOGY | Age: 59
End: 2019-10-01

## 2019-10-02 ENCOUNTER — TELEPHONE (OUTPATIENT)
Dept: ONCOLOGY | Age: 59
End: 2019-10-02

## 2019-10-03 ENCOUNTER — TELEPHONE (OUTPATIENT)
Dept: ONCOLOGY | Age: 59
End: 2019-10-03

## 2019-10-03 ENCOUNTER — HOSPITAL ENCOUNTER (OUTPATIENT)
Dept: INFUSION THERAPY | Age: 59
Discharge: HOME | End: 2019-10-03
Payer: COMMERCIAL

## 2019-10-03 ENCOUNTER — TELEPHONE (OUTPATIENT)
Dept: PALLATIVE CARE | Age: 59
End: 2019-10-03

## 2019-10-03 VITALS
TEMPERATURE: 97.7 F | BODY MASS INDEX: 15.66 KG/M2 | RESPIRATION RATE: 16 BRPM | SYSTOLIC BLOOD PRESSURE: 105 MMHG | WEIGHT: 100 LBS | DIASTOLIC BLOOD PRESSURE: 67 MMHG | HEART RATE: 82 BPM

## 2019-10-03 DIAGNOSIS — C25.1 MALIGNANT NEOPLASM OF BODY OF PANCREAS (HCC): Primary | ICD-10-CM

## 2019-10-03 DIAGNOSIS — C25.9 PANCREATIC ADENOCARCINOMA (HCC): ICD-10-CM

## 2019-10-03 LAB
ABSOLUTE EOS #: 0.3 K/UL (ref 0–0.4)
ABSOLUTE IMMATURE GRANULOCYTE: ABNORMAL K/UL (ref 0–0.3)
ABSOLUTE LYMPH #: 0.9 K/UL (ref 1–4.8)
ABSOLUTE MONO #: 0.6 K/UL (ref 0.1–1.2)
ALBUMIN SERPL-MCNC: 4 G/DL (ref 3.5–5.2)
ALBUMIN/GLOBULIN RATIO: 1.5 (ref 1–2.5)
ALP BLD-CCNC: 69 U/L (ref 35–104)
ALT SERPL-CCNC: 15 U/L (ref 5–33)
ANION GAP SERPL CALCULATED.3IONS-SCNC: 11 MMOL/L (ref 9–17)
AST SERPL-CCNC: 17 U/L
BASOPHILS # BLD: 1 % (ref 0–2)
BASOPHILS ABSOLUTE: 0.1 K/UL (ref 0–0.2)
BILIRUB SERPL-MCNC: 0.55 MG/DL (ref 0.3–1.2)
BUN BLDV-MCNC: 14 MG/DL (ref 6–20)
BUN/CREAT BLD: ABNORMAL (ref 9–20)
CA 19-9: ABNORMAL U/ML (ref 0–35)
CALCIUM SERPL-MCNC: 9.6 MG/DL (ref 8.6–10.4)
CHLORIDE BLD-SCNC: 101 MMOL/L (ref 98–107)
CO2: 26 MMOL/L (ref 20–31)
CREAT SERPL-MCNC: 0.45 MG/DL (ref 0.5–0.9)
DIFFERENTIAL TYPE: ABNORMAL
EOSINOPHILS RELATIVE PERCENT: 5 % (ref 1–4)
GFR AFRICAN AMERICAN: >60 ML/MIN
GFR NON-AFRICAN AMERICAN: >60 ML/MIN
GFR SERPL CREATININE-BSD FRML MDRD: ABNORMAL ML/MIN/{1.73_M2}
GFR SERPL CREATININE-BSD FRML MDRD: ABNORMAL ML/MIN/{1.73_M2}
GLUCOSE BLD-MCNC: 104 MG/DL (ref 70–99)
HCT VFR BLD CALC: 30.3 % (ref 36–46)
HEMOGLOBIN: 9.7 G/DL (ref 12–16)
IMMATURE GRANULOCYTES: ABNORMAL %
LYMPHOCYTES # BLD: 17 % (ref 24–44)
MCH RBC QN AUTO: 29 PG (ref 26–34)
MCHC RBC AUTO-ENTMCNC: 32.1 G/DL (ref 31–37)
MCV RBC AUTO: 90.2 FL (ref 80–100)
MONOCYTES # BLD: 10 % (ref 2–11)
NRBC AUTOMATED: ABNORMAL PER 100 WBC
PDW BLD-RTO: 19.8 % (ref 12.5–15.4)
PLATELET # BLD: 285 K/UL (ref 140–450)
PLATELET ESTIMATE: ABNORMAL
PMV BLD AUTO: 8.1 FL (ref 6–12)
POTASSIUM SERPL-SCNC: 4.1 MMOL/L (ref 3.7–5.3)
RBC # BLD: 3.36 M/UL (ref 4–5.2)
RBC # BLD: ABNORMAL 10*6/UL
SEG NEUTROPHILS: 67 % (ref 36–66)
SEGMENTED NEUTROPHILS ABSOLUTE COUNT: 3.7 K/UL (ref 1.8–7.7)
SODIUM BLD-SCNC: 138 MMOL/L (ref 135–144)
TOTAL PROTEIN: 6.6 G/DL (ref 6.4–8.3)
WBC # BLD: 5.5 K/UL (ref 3.5–11)
WBC # BLD: ABNORMAL 10*3/UL

## 2019-10-03 PROCEDURE — 85025 COMPLETE CBC W/AUTO DIFF WBC: CPT

## 2019-10-03 PROCEDURE — 80053 COMPREHEN METABOLIC PANEL: CPT

## 2019-10-03 PROCEDURE — 96413 CHEMO IV INFUSION 1 HR: CPT | Performed by: NURSE PRACTITIONER

## 2019-10-03 PROCEDURE — 96375 TX/PRO/DX INJ NEW DRUG ADDON: CPT | Performed by: NURSE PRACTITIONER

## 2019-10-03 PROCEDURE — 6360000002 HC RX W HCPCS: Performed by: INTERNAL MEDICINE

## 2019-10-03 PROCEDURE — 2580000003 HC RX 258: Performed by: INTERNAL MEDICINE

## 2019-10-03 PROCEDURE — 86301 IMMUNOASSAY TUMOR CA 19-9: CPT

## 2019-10-03 PROCEDURE — 36591 DRAW BLOOD OFF VENOUS DEVICE: CPT | Performed by: NURSE PRACTITIONER

## 2019-10-03 RX ORDER — SODIUM CHLORIDE 9 MG/ML
20 INJECTION, SOLUTION INTRAVENOUS ONCE
Status: DISCONTINUED | OUTPATIENT
Start: 2019-10-03 | End: 2019-10-04 | Stop reason: HOSPADM

## 2019-10-03 RX ORDER — ONDANSETRON 2 MG/ML
4 INJECTION INTRAMUSCULAR; INTRAVENOUS EVERY 4 HOURS PRN
Status: DISCONTINUED | OUTPATIENT
Start: 2019-10-03 | End: 2019-10-04 | Stop reason: HOSPADM

## 2019-10-03 RX ORDER — SODIUM CHLORIDE 0.9 % (FLUSH) 0.9 %
10 SYRINGE (ML) INJECTION PRN
Status: DISCONTINUED | OUTPATIENT
Start: 2019-10-03 | End: 2019-10-04 | Stop reason: HOSPADM

## 2019-10-03 RX ORDER — DEXAMETHASONE SODIUM PHOSPHATE 10 MG/ML
10 INJECTION INTRAMUSCULAR; INTRAVENOUS ONCE
Status: COMPLETED | OUTPATIENT
Start: 2019-10-03 | End: 2019-10-03

## 2019-10-03 RX ORDER — HEPARIN SODIUM (PORCINE) LOCK FLUSH IV SOLN 100 UNIT/ML 100 UNIT/ML
500 SOLUTION INTRAVENOUS PRN
Status: DISCONTINUED | OUTPATIENT
Start: 2019-10-03 | End: 2019-10-04 | Stop reason: HOSPADM

## 2019-10-03 RX ADMIN — GEMCITABINE 1460 MG: 38 INJECTION, SOLUTION INTRAVENOUS at 14:38

## 2019-10-03 RX ADMIN — ONDANSETRON 4 MG: 2 INJECTION INTRAMUSCULAR; INTRAVENOUS at 14:11

## 2019-10-03 RX ADMIN — DEXAMETHASONE SODIUM PHOSPHATE 10 MG: 10 INJECTION INTRAMUSCULAR; INTRAVENOUS at 14:14

## 2019-10-03 RX ADMIN — SODIUM CHLORIDE 20 ML/HR: 9 INJECTION, SOLUTION INTRAVENOUS at 14:09

## 2019-10-03 RX ADMIN — Medication 10 ML: at 15:13

## 2019-10-03 RX ADMIN — Medication 500 UNITS: at 15:15

## 2019-10-03 RX ADMIN — Medication 10 ML: at 13:17

## 2019-10-03 RX ADMIN — Medication 10 ML: at 13:14

## 2019-10-03 ASSESSMENT — PAIN DESCRIPTION - LOCATION: LOCATION: ABDOMEN;BACK

## 2019-10-03 ASSESSMENT — PAIN DESCRIPTION - DESCRIPTORS: DESCRIPTORS: ACHING;BURNING;NUMBNESS

## 2019-10-03 ASSESSMENT — PAIN SCALES - GENERAL: PAINLEVEL_OUTOF10: 4

## 2019-10-03 ASSESSMENT — PAIN DESCRIPTION - FREQUENCY: FREQUENCY: CONTINUOUS

## 2019-10-03 NOTE — PROGRESS NOTES
Patient here for c1d1 gemzar/Abraxane. Gemzar only today, all other days patient gets both drugs. This was clarified with Dr. Pollo Charles earlier today via phone call by this RN. Patient given teaching materials on new medications. Consent form signed and sent to triage for Dr. Pollo Charles to sign. Questions answered to patient and her husbands satisfaction. Patient tolerated chemo well.

## 2019-10-10 ENCOUNTER — OFFICE VISIT (OUTPATIENT)
Dept: ONCOLOGY | Age: 59
End: 2019-10-10
Payer: COMMERCIAL

## 2019-10-10 ENCOUNTER — TELEPHONE (OUTPATIENT)
Dept: PALLATIVE CARE | Age: 59
End: 2019-10-10

## 2019-10-10 ENCOUNTER — TELEPHONE (OUTPATIENT)
Dept: ONCOLOGY | Age: 59
End: 2019-10-10

## 2019-10-10 ENCOUNTER — HOSPITAL ENCOUNTER (OUTPATIENT)
Dept: INFUSION THERAPY | Age: 59
Discharge: HOME | End: 2019-10-10
Payer: COMMERCIAL

## 2019-10-10 VITALS
HEART RATE: 121 BPM | SYSTOLIC BLOOD PRESSURE: 115 MMHG | TEMPERATURE: 98 F | BODY MASS INDEX: 15.35 KG/M2 | DIASTOLIC BLOOD PRESSURE: 81 MMHG | WEIGHT: 98 LBS

## 2019-10-10 VITALS
SYSTOLIC BLOOD PRESSURE: 115 MMHG | RESPIRATION RATE: 16 BRPM | WEIGHT: 98.4 LBS | HEART RATE: 121 BPM | BODY MASS INDEX: 15.41 KG/M2 | TEMPERATURE: 98 F | DIASTOLIC BLOOD PRESSURE: 81 MMHG

## 2019-10-10 DIAGNOSIS — R11.2 CINV (CHEMOTHERAPY-INDUCED NAUSEA AND VOMITING): ICD-10-CM

## 2019-10-10 DIAGNOSIS — C25.9 PANCREATIC ADENOCARCINOMA (HCC): ICD-10-CM

## 2019-10-10 DIAGNOSIS — C25.1 MALIGNANT NEOPLASM OF BODY OF PANCREAS (HCC): Primary | ICD-10-CM

## 2019-10-10 DIAGNOSIS — T45.1X5A CINV (CHEMOTHERAPY-INDUCED NAUSEA AND VOMITING): ICD-10-CM

## 2019-10-10 LAB
ABSOLUTE EOS #: 0.1 K/UL (ref 0–0.4)
ABSOLUTE IMMATURE GRANULOCYTE: ABNORMAL K/UL (ref 0–0.3)
ABSOLUTE LYMPH #: 0.8 K/UL (ref 1–4.8)
ABSOLUTE MONO #: 0.2 K/UL (ref 0.1–1.2)
ALBUMIN SERPL-MCNC: 4.2 G/DL (ref 3.5–5.2)
ALBUMIN/GLOBULIN RATIO: 1.5 (ref 1–2.5)
ALP BLD-CCNC: 68 U/L (ref 35–104)
ALT SERPL-CCNC: 15 U/L (ref 5–33)
ANION GAP SERPL CALCULATED.3IONS-SCNC: 9 MMOL/L (ref 9–17)
AST SERPL-CCNC: 18 U/L
BASOPHILS # BLD: 2 % (ref 0–2)
BASOPHILS ABSOLUTE: 0 K/UL (ref 0–0.2)
BILIRUB SERPL-MCNC: 0.58 MG/DL (ref 0.3–1.2)
BUN BLDV-MCNC: 12 MG/DL (ref 6–20)
BUN/CREAT BLD: ABNORMAL (ref 9–20)
CALCIUM SERPL-MCNC: 10 MG/DL (ref 8.6–10.4)
CHLORIDE BLD-SCNC: 101 MMOL/L (ref 98–107)
CO2: 28 MMOL/L (ref 20–31)
CREAT SERPL-MCNC: 0.43 MG/DL (ref 0.5–0.9)
DIFFERENTIAL TYPE: ABNORMAL
EOSINOPHILS RELATIVE PERCENT: 6 % (ref 1–4)
GFR AFRICAN AMERICAN: >60 ML/MIN
GFR NON-AFRICAN AMERICAN: >60 ML/MIN
GFR SERPL CREATININE-BSD FRML MDRD: ABNORMAL ML/MIN/{1.73_M2}
GFR SERPL CREATININE-BSD FRML MDRD: ABNORMAL ML/MIN/{1.73_M2}
GLUCOSE BLD-MCNC: 118 MG/DL (ref 70–99)
HCT VFR BLD CALC: 30.9 % (ref 36–46)
HEMOGLOBIN: 10 G/DL (ref 12–16)
IMMATURE GRANULOCYTES: ABNORMAL %
LYMPHOCYTES # BLD: 33 % (ref 24–44)
MCH RBC QN AUTO: 29.4 PG (ref 26–34)
MCHC RBC AUTO-ENTMCNC: 32.5 G/DL (ref 31–37)
MCV RBC AUTO: 90.5 FL (ref 80–100)
MONOCYTES # BLD: 11 % (ref 2–11)
NRBC AUTOMATED: ABNORMAL PER 100 WBC
PDW BLD-RTO: 18.2 % (ref 12.5–15.4)
PLATELET # BLD: 211 K/UL (ref 140–450)
PLATELET ESTIMATE: ABNORMAL
PMV BLD AUTO: 7.7 FL (ref 6–12)
POTASSIUM SERPL-SCNC: 3.8 MMOL/L (ref 3.7–5.3)
RBC # BLD: 3.42 M/UL (ref 4–5.2)
RBC # BLD: ABNORMAL 10*6/UL
SEG NEUTROPHILS: 48 % (ref 36–66)
SEGMENTED NEUTROPHILS ABSOLUTE COUNT: 1.1 K/UL (ref 1.8–7.7)
SODIUM BLD-SCNC: 138 MMOL/L (ref 135–144)
TOTAL PROTEIN: 7 G/DL (ref 6.4–8.3)
WBC # BLD: 2.3 K/UL (ref 3.5–11)
WBC # BLD: ABNORMAL 10*3/UL

## 2019-10-10 PROCEDURE — 99214 OFFICE O/P EST MOD 30 MIN: CPT | Performed by: INTERNAL MEDICINE

## 2019-10-10 PROCEDURE — 80053 COMPREHEN METABOLIC PANEL: CPT

## 2019-10-10 PROCEDURE — 85025 COMPLETE CBC W/AUTO DIFF WBC: CPT

## 2019-10-10 PROCEDURE — 2580000003 HC RX 258: Performed by: INTERNAL MEDICINE

## 2019-10-10 PROCEDURE — 96417 CHEMO IV INFUS EACH ADDL SEQ: CPT

## 2019-10-10 PROCEDURE — 96375 TX/PRO/DX INJ NEW DRUG ADDON: CPT

## 2019-10-10 PROCEDURE — 96413 CHEMO IV INFUSION 1 HR: CPT

## 2019-10-10 PROCEDURE — 6360000002 HC RX W HCPCS: Performed by: INTERNAL MEDICINE

## 2019-10-10 RX ORDER — SODIUM CHLORIDE 0.9 % (FLUSH) 0.9 %
10 SYRINGE (ML) INJECTION PRN
Status: DISCONTINUED | OUTPATIENT
Start: 2019-10-10 | End: 2019-10-11 | Stop reason: HOSPADM

## 2019-10-10 RX ORDER — PACLITAXEL 100 MG/20ML
180 INJECTION, POWDER, LYOPHILIZED, FOR SUSPENSION INTRAVENOUS ONCE
Status: COMPLETED | OUTPATIENT
Start: 2019-10-10 | End: 2019-10-10

## 2019-10-10 RX ORDER — SODIUM CHLORIDE 9 MG/ML
20 INJECTION, SOLUTION INTRAVENOUS ONCE
Status: COMPLETED | OUTPATIENT
Start: 2019-10-10 | End: 2019-10-10

## 2019-10-10 RX ORDER — ONDANSETRON 2 MG/ML
4 INJECTION INTRAMUSCULAR; INTRAVENOUS EVERY 4 HOURS PRN
Status: DISCONTINUED | OUTPATIENT
Start: 2019-10-10 | End: 2019-10-11 | Stop reason: HOSPADM

## 2019-10-10 RX ORDER — HEPARIN SODIUM (PORCINE) LOCK FLUSH IV SOLN 100 UNIT/ML 100 UNIT/ML
500 SOLUTION INTRAVENOUS PRN
Status: DISCONTINUED | OUTPATIENT
Start: 2019-10-10 | End: 2019-10-11 | Stop reason: HOSPADM

## 2019-10-10 RX ORDER — DEXAMETHASONE SODIUM PHOSPHATE 10 MG/ML
10 INJECTION INTRAMUSCULAR; INTRAVENOUS ONCE
Status: COMPLETED | OUTPATIENT
Start: 2019-10-10 | End: 2019-10-10

## 2019-10-10 RX ADMIN — GEMCITABINE 1400 MG: 38 INJECTION, SOLUTION INTRAVENOUS at 11:50

## 2019-10-10 RX ADMIN — SODIUM CHLORIDE 20 ML/HR: 9 INJECTION, SOLUTION INTRAVENOUS at 10:19

## 2019-10-10 RX ADMIN — DEXAMETHASONE SODIUM PHOSPHATE 10 MG: 10 INJECTION INTRAMUSCULAR; INTRAVENOUS at 10:22

## 2019-10-10 RX ADMIN — Medication 10 ML: at 12:40

## 2019-10-10 RX ADMIN — Medication 10 ML: at 09:16

## 2019-10-10 RX ADMIN — Medication 10 ML: at 09:17

## 2019-10-10 RX ADMIN — Medication 500 UNITS: at 12:40

## 2019-10-10 RX ADMIN — ONDANSETRON 4 MG: 2 INJECTION INTRAMUSCULAR; INTRAVENOUS at 10:19

## 2019-10-10 RX ADMIN — PACLITAXEL 180 MG: 100 INJECTION, POWDER, LYOPHILIZED, FOR SUSPENSION INTRAVENOUS at 10:52

## 2019-10-10 ASSESSMENT — PAIN DESCRIPTION - DESCRIPTORS: DESCRIPTORS: DULL

## 2019-10-10 ASSESSMENT — PAIN SCALES - GENERAL: PAINLEVEL_OUTOF10: 4

## 2019-10-10 ASSESSMENT — PAIN DESCRIPTION - ORIENTATION: ORIENTATION: RIGHT

## 2019-10-10 ASSESSMENT — PAIN DESCRIPTION - LOCATION: LOCATION: RIB CAGE

## 2019-10-10 NOTE — PROGRESS NOTES
Pt here for C1D8 Abraxane and Gemzar. C/o increased pain to right rib region. Labs drawn from port and results reviewed. Pt seen by Dr Raul Gregory at chairside for follow up, refer to his note, [address]10 discussed with Dr Raul Gregory and ok to treat today. Pt was treated without incident and d/c'd in stable condition. Pt will return on 10-17-19 for C1D15.

## 2019-10-17 ENCOUNTER — OFFICE VISIT (OUTPATIENT)
Dept: ONCOLOGY | Age: 59
End: 2019-10-17
Payer: COMMERCIAL

## 2019-10-17 ENCOUNTER — TELEPHONE (OUTPATIENT)
Dept: PALLATIVE CARE | Age: 59
End: 2019-10-17

## 2019-10-17 ENCOUNTER — TELEPHONE (OUTPATIENT)
Dept: ONCOLOGY | Age: 59
End: 2019-10-17

## 2019-10-17 ENCOUNTER — HOSPITAL ENCOUNTER (OUTPATIENT)
Dept: INFUSION THERAPY | Age: 59
Discharge: HOME | End: 2019-10-17
Payer: COMMERCIAL

## 2019-10-17 VITALS
BODY MASS INDEX: 16.32 KG/M2 | SYSTOLIC BLOOD PRESSURE: 125 MMHG | DIASTOLIC BLOOD PRESSURE: 78 MMHG | WEIGHT: 104.2 LBS | HEART RATE: 85 BPM | TEMPERATURE: 98.2 F

## 2019-10-17 VITALS
HEART RATE: 82 BPM | BODY MASS INDEX: 16.32 KG/M2 | DIASTOLIC BLOOD PRESSURE: 78 MMHG | WEIGHT: 104.2 LBS | TEMPERATURE: 98.2 F | SYSTOLIC BLOOD PRESSURE: 125 MMHG

## 2019-10-17 DIAGNOSIS — R11.2 CINV (CHEMOTHERAPY-INDUCED NAUSEA AND VOMITING): ICD-10-CM

## 2019-10-17 DIAGNOSIS — C25.1 MALIGNANT NEOPLASM OF BODY OF PANCREAS (HCC): ICD-10-CM

## 2019-10-17 DIAGNOSIS — T45.1X5A CINV (CHEMOTHERAPY-INDUCED NAUSEA AND VOMITING): ICD-10-CM

## 2019-10-17 DIAGNOSIS — C25.9 PANCREATIC ADENOCARCINOMA (HCC): ICD-10-CM

## 2019-10-17 DIAGNOSIS — C25.1 MALIGNANT NEOPLASM OF BODY OF PANCREAS (HCC): Primary | ICD-10-CM

## 2019-10-17 LAB
ABSOLUTE EOS #: 0.1 K/UL (ref 0–0.4)
ABSOLUTE IMMATURE GRANULOCYTE: ABNORMAL K/UL (ref 0–0.3)
ABSOLUTE LYMPH #: 0.6 K/UL (ref 1–4.8)
ABSOLUTE MONO #: 0.2 K/UL (ref 0.1–1.2)
ALBUMIN SERPL-MCNC: 3.9 G/DL (ref 3.5–5.2)
ALBUMIN/GLOBULIN RATIO: 1.5 (ref 1–2.5)
ALP BLD-CCNC: 56 U/L (ref 35–104)
ALT SERPL-CCNC: 12 U/L (ref 5–33)
ANION GAP SERPL CALCULATED.3IONS-SCNC: 9 MMOL/L (ref 9–17)
AST SERPL-CCNC: 16 U/L
BASOPHILS # BLD: 0 % (ref 0–2)
BASOPHILS ABSOLUTE: 0 K/UL (ref 0–0.2)
BILIRUB SERPL-MCNC: 0.32 MG/DL (ref 0.3–1.2)
BUN BLDV-MCNC: 13 MG/DL (ref 6–20)
BUN/CREAT BLD: ABNORMAL (ref 9–20)
CALCIUM SERPL-MCNC: 9.4 MG/DL (ref 8.6–10.4)
CHLORIDE BLD-SCNC: 104 MMOL/L (ref 98–107)
CO2: 28 MMOL/L (ref 20–31)
CREAT SERPL-MCNC: 0.48 MG/DL (ref 0.5–0.9)
DIFFERENTIAL TYPE: ABNORMAL
EOSINOPHILS RELATIVE PERCENT: 3 % (ref 1–4)
GFR AFRICAN AMERICAN: >60 ML/MIN
GFR NON-AFRICAN AMERICAN: >60 ML/MIN
GFR SERPL CREATININE-BSD FRML MDRD: ABNORMAL ML/MIN/{1.73_M2}
GFR SERPL CREATININE-BSD FRML MDRD: ABNORMAL ML/MIN/{1.73_M2}
GLUCOSE BLD-MCNC: 92 MG/DL (ref 70–99)
HCT VFR BLD CALC: 26.6 % (ref 36–46)
HEMOGLOBIN: 8.5 G/DL (ref 12–16)
IMMATURE GRANULOCYTES: ABNORMAL %
LYMPHOCYTES # BLD: 31 % (ref 24–44)
MCH RBC QN AUTO: 29 PG (ref 26–34)
MCHC RBC AUTO-ENTMCNC: 31.9 G/DL (ref 31–37)
MCV RBC AUTO: 90.9 FL (ref 80–100)
MONOCYTES # BLD: 11 % (ref 2–11)
NRBC AUTOMATED: ABNORMAL PER 100 WBC
PDW BLD-RTO: 18 % (ref 12.5–15.4)
PLATELET # BLD: 112 K/UL (ref 140–450)
PLATELET ESTIMATE: ABNORMAL
PMV BLD AUTO: 8.1 FL (ref 6–12)
POTASSIUM SERPL-SCNC: 3.9 MMOL/L (ref 3.7–5.3)
RBC # BLD: 2.93 M/UL (ref 4–5.2)
RBC # BLD: ABNORMAL 10*6/UL
SEG NEUTROPHILS: 55 % (ref 36–66)
SEGMENTED NEUTROPHILS ABSOLUTE COUNT: 1.1 K/UL (ref 1.8–7.7)
SODIUM BLD-SCNC: 141 MMOL/L (ref 135–144)
TOTAL PROTEIN: 6.5 G/DL (ref 6.4–8.3)
WBC # BLD: 1.9 K/UL (ref 3.5–11)
WBC # BLD: ABNORMAL 10*3/UL

## 2019-10-17 PROCEDURE — 80053 COMPREHEN METABOLIC PANEL: CPT

## 2019-10-17 PROCEDURE — 2580000003 HC RX 258: Performed by: INTERNAL MEDICINE

## 2019-10-17 PROCEDURE — 99214 OFFICE O/P EST MOD 30 MIN: CPT | Performed by: INTERNAL MEDICINE

## 2019-10-17 PROCEDURE — 96413 CHEMO IV INFUSION 1 HR: CPT

## 2019-10-17 PROCEDURE — 36591 DRAW BLOOD OFF VENOUS DEVICE: CPT

## 2019-10-17 PROCEDURE — 96417 CHEMO IV INFUS EACH ADDL SEQ: CPT

## 2019-10-17 PROCEDURE — 96375 TX/PRO/DX INJ NEW DRUG ADDON: CPT

## 2019-10-17 PROCEDURE — 6360000002 HC RX W HCPCS: Performed by: INTERNAL MEDICINE

## 2019-10-17 PROCEDURE — 85025 COMPLETE CBC W/AUTO DIFF WBC: CPT

## 2019-10-17 RX ORDER — PACLITAXEL 100 MG/20ML
125 INJECTION, POWDER, LYOPHILIZED, FOR SUSPENSION INTRAVENOUS ONCE
Status: CANCELLED | OUTPATIENT
Start: 2019-11-19

## 2019-10-17 RX ORDER — EPINEPHRINE 1 MG/ML
0.3 INJECTION, SOLUTION, CONCENTRATE INTRAVENOUS PRN
Status: CANCELLED | OUTPATIENT
Start: 2019-11-05

## 2019-10-17 RX ORDER — SODIUM CHLORIDE 0.9 % (FLUSH) 0.9 %
5 SYRINGE (ML) INJECTION PRN
Status: CANCELLED | OUTPATIENT
Start: 2019-11-12

## 2019-10-17 RX ORDER — MEPERIDINE HYDROCHLORIDE 50 MG/ML
12.5 INJECTION INTRAMUSCULAR; INTRAVENOUS; SUBCUTANEOUS ONCE
Status: CANCELLED | OUTPATIENT
Start: 2019-11-12

## 2019-10-17 RX ORDER — ONDANSETRON 2 MG/ML
4 INJECTION INTRAMUSCULAR; INTRAVENOUS EVERY 4 HOURS PRN
Status: CANCELLED
Start: 2019-11-12

## 2019-10-17 RX ORDER — DEXAMETHASONE SODIUM PHOSPHATE 10 MG/ML
10 INJECTION INTRAMUSCULAR; INTRAVENOUS ONCE
Status: COMPLETED | OUTPATIENT
Start: 2019-10-17 | End: 2019-10-17

## 2019-10-17 RX ORDER — HEPARIN SODIUM (PORCINE) LOCK FLUSH IV SOLN 100 UNIT/ML 100 UNIT/ML
500 SOLUTION INTRAVENOUS PRN
Status: DISCONTINUED | OUTPATIENT
Start: 2019-10-17 | End: 2019-10-18 | Stop reason: HOSPADM

## 2019-10-17 RX ORDER — DIPHENHYDRAMINE HYDROCHLORIDE 50 MG/ML
50 INJECTION INTRAMUSCULAR; INTRAVENOUS ONCE
Status: CANCELLED | OUTPATIENT
Start: 2019-11-12

## 2019-10-17 RX ORDER — SODIUM CHLORIDE 0.9 % (FLUSH) 0.9 %
5 SYRINGE (ML) INJECTION PRN
Status: CANCELLED | OUTPATIENT
Start: 2019-11-19

## 2019-10-17 RX ORDER — SODIUM CHLORIDE 9 MG/ML
20 INJECTION, SOLUTION INTRAVENOUS ONCE
Status: COMPLETED | OUTPATIENT
Start: 2019-10-17 | End: 2019-10-17

## 2019-10-17 RX ORDER — PACLITAXEL 100 MG/20ML
125 INJECTION, POWDER, LYOPHILIZED, FOR SUSPENSION INTRAVENOUS ONCE
Status: CANCELLED | OUTPATIENT
Start: 2019-11-12

## 2019-10-17 RX ORDER — SODIUM CHLORIDE 9 MG/ML
20 INJECTION, SOLUTION INTRAVENOUS ONCE
Status: CANCELLED | OUTPATIENT
Start: 2019-11-12

## 2019-10-17 RX ORDER — ONDANSETRON 2 MG/ML
4 INJECTION INTRAMUSCULAR; INTRAVENOUS EVERY 4 HOURS PRN
Status: CANCELLED
Start: 2019-11-19

## 2019-10-17 RX ORDER — METHYLPREDNISOLONE SODIUM SUCCINATE 125 MG/2ML
125 INJECTION, POWDER, LYOPHILIZED, FOR SOLUTION INTRAMUSCULAR; INTRAVENOUS ONCE
Status: CANCELLED | OUTPATIENT
Start: 2019-11-19

## 2019-10-17 RX ORDER — SODIUM CHLORIDE 9 MG/ML
INJECTION, SOLUTION INTRAVENOUS CONTINUOUS
Status: CANCELLED | OUTPATIENT
Start: 2019-11-19

## 2019-10-17 RX ORDER — PACLITAXEL 100 MG/20ML
125 INJECTION, POWDER, LYOPHILIZED, FOR SUSPENSION INTRAVENOUS ONCE
Status: CANCELLED | OUTPATIENT
Start: 2019-11-05

## 2019-10-17 RX ORDER — EPINEPHRINE 1 MG/ML
0.3 INJECTION, SOLUTION, CONCENTRATE INTRAVENOUS PRN
Status: CANCELLED | OUTPATIENT
Start: 2019-11-12

## 2019-10-17 RX ORDER — SODIUM CHLORIDE 0.9 % (FLUSH) 0.9 %
10 SYRINGE (ML) INJECTION PRN
Status: CANCELLED | OUTPATIENT
Start: 2019-11-19

## 2019-10-17 RX ORDER — ONDANSETRON 2 MG/ML
4 INJECTION INTRAMUSCULAR; INTRAVENOUS EVERY 4 HOURS PRN
Status: CANCELLED
Start: 2019-11-05

## 2019-10-17 RX ORDER — MEPERIDINE HYDROCHLORIDE 50 MG/ML
12.5 INJECTION INTRAMUSCULAR; INTRAVENOUS; SUBCUTANEOUS ONCE
Status: CANCELLED | OUTPATIENT
Start: 2019-11-05

## 2019-10-17 RX ORDER — ONDANSETRON 2 MG/ML
4 INJECTION INTRAMUSCULAR; INTRAVENOUS EVERY 4 HOURS PRN
Status: DISCONTINUED | OUTPATIENT
Start: 2019-10-17 | End: 2019-10-18 | Stop reason: HOSPADM

## 2019-10-17 RX ORDER — SODIUM CHLORIDE 9 MG/ML
INJECTION, SOLUTION INTRAVENOUS CONTINUOUS
Status: CANCELLED | OUTPATIENT
Start: 2019-11-05

## 2019-10-17 RX ORDER — SODIUM CHLORIDE 9 MG/ML
20 INJECTION, SOLUTION INTRAVENOUS ONCE
Status: CANCELLED | OUTPATIENT
Start: 2019-11-05

## 2019-10-17 RX ORDER — SODIUM CHLORIDE 0.9 % (FLUSH) 0.9 %
10 SYRINGE (ML) INJECTION PRN
Status: CANCELLED | OUTPATIENT
Start: 2019-11-12

## 2019-10-17 RX ORDER — DIPHENHYDRAMINE HYDROCHLORIDE 50 MG/ML
50 INJECTION INTRAMUSCULAR; INTRAVENOUS ONCE
Status: CANCELLED | OUTPATIENT
Start: 2019-11-05

## 2019-10-17 RX ORDER — MEPERIDINE HYDROCHLORIDE 50 MG/ML
12.5 INJECTION INTRAMUSCULAR; INTRAVENOUS; SUBCUTANEOUS ONCE
Status: CANCELLED | OUTPATIENT
Start: 2019-11-19

## 2019-10-17 RX ORDER — HEPARIN SODIUM (PORCINE) LOCK FLUSH IV SOLN 100 UNIT/ML 100 UNIT/ML
500 SOLUTION INTRAVENOUS PRN
Status: CANCELLED | OUTPATIENT
Start: 2019-11-12

## 2019-10-17 RX ORDER — SODIUM CHLORIDE 9 MG/ML
INJECTION, SOLUTION INTRAVENOUS CONTINUOUS
Status: CANCELLED | OUTPATIENT
Start: 2019-11-12

## 2019-10-17 RX ORDER — METHYLPREDNISOLONE SODIUM SUCCINATE 125 MG/2ML
125 INJECTION, POWDER, LYOPHILIZED, FOR SOLUTION INTRAMUSCULAR; INTRAVENOUS ONCE
Status: CANCELLED | OUTPATIENT
Start: 2019-11-12

## 2019-10-17 RX ORDER — HEPARIN SODIUM (PORCINE) LOCK FLUSH IV SOLN 100 UNIT/ML 100 UNIT/ML
500 SOLUTION INTRAVENOUS PRN
Status: CANCELLED | OUTPATIENT
Start: 2019-11-05

## 2019-10-17 RX ORDER — SODIUM CHLORIDE 0.9 % (FLUSH) 0.9 %
10 SYRINGE (ML) INJECTION PRN
Status: DISCONTINUED | OUTPATIENT
Start: 2019-10-17 | End: 2019-10-18 | Stop reason: HOSPADM

## 2019-10-17 RX ORDER — DIPHENHYDRAMINE HYDROCHLORIDE 50 MG/ML
50 INJECTION INTRAMUSCULAR; INTRAVENOUS ONCE
Status: CANCELLED | OUTPATIENT
Start: 2019-11-19

## 2019-10-17 RX ORDER — OXYCODONE HYDROCHLORIDE 10 MG/1
10 TABLET ORAL EVERY 6 HOURS PRN
Qty: 120 TABLET | Refills: 0 | Status: SHIPPED | OUTPATIENT
Start: 2019-10-17 | End: 2019-11-16

## 2019-10-17 RX ORDER — EPINEPHRINE 1 MG/ML
0.3 INJECTION, SOLUTION, CONCENTRATE INTRAVENOUS PRN
Status: CANCELLED | OUTPATIENT
Start: 2019-11-19

## 2019-10-17 RX ORDER — HEPARIN SODIUM (PORCINE) LOCK FLUSH IV SOLN 100 UNIT/ML 100 UNIT/ML
500 SOLUTION INTRAVENOUS PRN
Status: CANCELLED | OUTPATIENT
Start: 2019-11-19

## 2019-10-17 RX ORDER — SODIUM CHLORIDE 9 MG/ML
20 INJECTION, SOLUTION INTRAVENOUS ONCE
Status: CANCELLED | OUTPATIENT
Start: 2019-11-19

## 2019-10-17 RX ORDER — SODIUM CHLORIDE 0.9 % (FLUSH) 0.9 %
5 SYRINGE (ML) INJECTION PRN
Status: CANCELLED | OUTPATIENT
Start: 2019-11-05

## 2019-10-17 RX ORDER — SODIUM CHLORIDE 0.9 % (FLUSH) 0.9 %
10 SYRINGE (ML) INJECTION PRN
Status: CANCELLED | OUTPATIENT
Start: 2019-11-05

## 2019-10-17 RX ORDER — METHYLPREDNISOLONE SODIUM SUCCINATE 125 MG/2ML
125 INJECTION, POWDER, LYOPHILIZED, FOR SOLUTION INTRAMUSCULAR; INTRAVENOUS ONCE
Status: CANCELLED | OUTPATIENT
Start: 2019-11-05

## 2019-10-17 RX ORDER — PACLITAXEL 100 MG/20ML
180 INJECTION, POWDER, LYOPHILIZED, FOR SUSPENSION INTRAVENOUS ONCE
Status: COMPLETED | OUTPATIENT
Start: 2019-10-17 | End: 2019-10-17

## 2019-10-17 RX ADMIN — Medication 10 ML: at 13:09

## 2019-10-17 RX ADMIN — HEPARIN 500 UNITS: 100 SYRINGE at 16:35

## 2019-10-17 RX ADMIN — ONDANSETRON 4 MG: 2 INJECTION, SOLUTION INTRAMUSCULAR; INTRAVENOUS at 14:32

## 2019-10-17 RX ADMIN — SODIUM CHLORIDE 20 ML/HR: 9 INJECTION, SOLUTION INTRAVENOUS at 14:24

## 2019-10-17 RX ADMIN — PACLITAXEL 180 MG: 100 INJECTION, POWDER, LYOPHILIZED, FOR SUSPENSION INTRAVENOUS at 15:11

## 2019-10-17 RX ADMIN — DEXAMETHASONE SODIUM PHOSPHATE 10 MG: 10 INJECTION INTRAMUSCULAR; INTRAVENOUS at 14:34

## 2019-10-17 RX ADMIN — Medication 10 ML: at 16:35

## 2019-10-17 RX ADMIN — GEMCITABINE 1400 MG: 38 INJECTION, SOLUTION INTRAVENOUS at 15:54

## 2019-10-17 ASSESSMENT — PAIN SCALES - GENERAL: PAINLEVEL_OUTOF10: 3

## 2019-10-17 ASSESSMENT — PAIN DESCRIPTION - LOCATION: LOCATION: RIB CAGE

## 2019-10-17 NOTE — PROGRESS NOTES
Pt here for C1D15 abraxane gemzar. Labs drawn and reviewed. Dr. Yanet Roach saw patient in infusion. Labs reviewed and proceeding with treatment. See dictation for further notes. Patient tolerated treatment without incident.

## 2019-10-17 NOTE — FLOWSHEET NOTE
Situation:   encountered Ms. Bedoya and Spouse when rounding the infusion clinic. Assessment:  Ms. Nabor Harris was joined by her , Adam Schmidt, in the infusion clinic. She smiled and greeted . She engaged in conversation. Ms. Nabor Harris shared that she has a large and strong support system, including her , children/in-laws, friends, and Jain family. She continues to receive \"3 or 4 cards\" a week a year after her diagnosis. She smiled and showed a photo of her two grandchildren, including her grandson who was born last month.  described Pt's strengths, including kindness. Pt and  have been  over 28 years. Ms. Nabor Harris named her sudhakar as what has helped her cope. \"It has to. \" She acknowledged that it has been difficult for her at times. She shared that she learned she was stronger than she thought she was.  agreed, stating that Pt has been stronger than he would be in her situation. Intervention:   provided supportive presence and explored Pt's coping and needs; inquired about Pt's sources of support and strength; affirmed Pt's strengths; offered words of encouragement and support; gave Pt a copy of \"Our Daily Bread. \"    Outcome:  Ms. Nabor Harris acknowledged 's words of encouragement and support and expressed thanks. 10/17/19 1525   Encounter Summary   Services provided to: Patient and family together   Referral/Consult From: [facility] Family members; Children;Friends/neighbors; Muslim/sudhakar community   Place of [address] [place] [address]   Continue Visiting   (10/17/19)   Complexity of Encounter Low   Length of Encounter 15 minutes   Spiritual Assessment Completed Yes   Spiritual/Rastafarian   Type Spiritual support   Assessment Calm; Approachable; Hopeful;Coping   Intervention Active listening;Explored feelings, thoughts, concerns;Explored coping resources;Provided reading materials/devotional materials;Sustaining presence/ Ministry of presence; Discussed relationship with God;Discussed belief system/Scientologist practices/sudhakar;Discussed illness/injury and it's impact   Outcome Receptive; Hopeful;Encouraged;Coping;Expressed feelings/needs/concerns;Engaged in conversation;Expressed gratitude     Electronically signed by Myia Cano, Oncology Outpatient [facility] [address], [address] [facility] Radiation Oncology  10/17/2019  3:27 PM

## 2019-10-21 DIAGNOSIS — R11.2 CINV (CHEMOTHERAPY-INDUCED NAUSEA AND VOMITING): ICD-10-CM

## 2019-10-21 DIAGNOSIS — C25.1 MALIGNANT NEOPLASM OF BODY OF PANCREAS (HCC): ICD-10-CM

## 2019-10-21 DIAGNOSIS — T45.1X5A CINV (CHEMOTHERAPY-INDUCED NAUSEA AND VOMITING): ICD-10-CM

## 2019-10-21 RX ORDER — OXYCODONE HCL 10 MG/1
10 TABLET, FILM COATED, EXTENDED RELEASE ORAL EVERY 12 HOURS
Qty: 60 TABLET | Refills: 0 | Status: SHIPPED | OUTPATIENT
Start: 2019-10-21 | End: 2019-11-19 | Stop reason: SDUPTHER

## 2019-10-31 ENCOUNTER — HOSPITAL ENCOUNTER (OUTPATIENT)
Dept: INFUSION THERAPY | Age: 59
End: 2019-10-31
Payer: COMMERCIAL

## 2019-10-31 ENCOUNTER — TELEPHONE (OUTPATIENT)
Dept: ONCOLOGY | Age: 59
End: 2019-10-31

## 2019-11-01 ENCOUNTER — TELEPHONE (OUTPATIENT)
Dept: ONCOLOGY | Age: 59
End: 2019-11-01

## 2019-11-01 ENCOUNTER — HOSPITAL ENCOUNTER (OUTPATIENT)
Dept: INFUSION THERAPY | Age: 59
Discharge: HOME | End: 2019-11-01
Payer: COMMERCIAL

## 2019-11-01 VITALS
HEART RATE: 89 BPM | DIASTOLIC BLOOD PRESSURE: 81 MMHG | SYSTOLIC BLOOD PRESSURE: 129 MMHG | TEMPERATURE: 97.9 F | RESPIRATION RATE: 16 BRPM

## 2019-11-01 DIAGNOSIS — C25.9 PANCREATIC ADENOCARCINOMA (HCC): Primary | ICD-10-CM

## 2019-11-01 PROCEDURE — 96361 HYDRATE IV INFUSION ADD-ON: CPT | Performed by: NURSE PRACTITIONER

## 2019-11-01 PROCEDURE — 2580000003 HC RX 258: Performed by: INTERNAL MEDICINE

## 2019-11-01 PROCEDURE — 96360 HYDRATION IV INFUSION INIT: CPT | Performed by: NURSE PRACTITIONER

## 2019-11-01 PROCEDURE — 6360000002 HC RX W HCPCS: Performed by: INTERNAL MEDICINE

## 2019-11-01 RX ORDER — HEPARIN SODIUM (PORCINE) LOCK FLUSH IV SOLN 100 UNIT/ML 100 UNIT/ML
500 SOLUTION INTRAVENOUS PRN
Status: DISCONTINUED | OUTPATIENT
Start: 2019-11-01 | End: 2019-11-02 | Stop reason: HOSPADM

## 2019-11-01 RX ORDER — HEPARIN SODIUM (PORCINE) LOCK FLUSH IV SOLN 100 UNIT/ML 100 UNIT/ML
500 SOLUTION INTRAVENOUS PRN
Status: CANCELLED | OUTPATIENT
Start: 2019-11-01

## 2019-11-01 RX ORDER — 0.9 % SODIUM CHLORIDE 0.9 %
1000 INTRAVENOUS SOLUTION INTRAVENOUS ONCE
Status: COMPLETED | OUTPATIENT
Start: 2019-11-01 | End: 2019-11-01

## 2019-11-01 RX ORDER — SODIUM CHLORIDE 0.9 % (FLUSH) 0.9 %
10 SYRINGE (ML) INJECTION PRN
Status: CANCELLED | OUTPATIENT
Start: 2019-11-01

## 2019-11-01 RX ORDER — SODIUM CHLORIDE 0.9 % (FLUSH) 0.9 %
10 SYRINGE (ML) INJECTION PRN
Status: DISCONTINUED | OUTPATIENT
Start: 2019-11-01 | End: 2019-11-02 | Stop reason: HOSPADM

## 2019-11-01 RX ORDER — SODIUM CHLORIDE 0.9 % (FLUSH) 0.9 %
20 SYRINGE (ML) INJECTION PRN
Status: CANCELLED | OUTPATIENT
Start: 2019-11-01

## 2019-11-01 RX ADMIN — Medication 500 UNITS: at 15:58

## 2019-11-01 RX ADMIN — Medication 10 ML: at 14:10

## 2019-11-01 RX ADMIN — Medication 10 ML: at 14:06

## 2019-11-01 RX ADMIN — SODIUM CHLORIDE 1000 ML: 9 INJECTION, SOLUTION INTRAVENOUS at 14:10

## 2019-11-01 RX ADMIN — Medication 10 ML: at 15:57

## 2019-11-01 ASSESSMENT — PAIN DESCRIPTION - FREQUENCY: FREQUENCY: CONTINUOUS

## 2019-11-01 ASSESSMENT — PAIN SCALES - GENERAL: PAINLEVEL_OUTOF10: 3

## 2019-11-01 ASSESSMENT — PAIN DESCRIPTION - PAIN TYPE: TYPE: CHRONIC PAIN

## 2019-11-01 ASSESSMENT — PAIN DESCRIPTION - LOCATION: LOCATION: ABDOMEN;BACK

## 2019-11-01 ASSESSMENT — PAIN - FUNCTIONAL ASSESSMENT: PAIN_FUNCTIONAL_ASSESSMENT: PREVENTS OR INTERFERES SOME ACTIVE ACTIVITIES AND ADLS

## 2019-11-01 ASSESSMENT — PAIN DESCRIPTION - DESCRIPTORS: DESCRIPTORS: ACHING

## 2019-11-01 ASSESSMENT — PAIN DESCRIPTION - PROGRESSION: CLINICAL_PROGRESSION: NOT CHANGED

## 2019-11-01 NOTE — PLAN OF CARE
Problem: KNOWLEDGE DEFICIT  Goal: Patient/S.O. demonstrates understanding of disease process, treatment plan, medications, and discharge instructions.   11/1/2019 1442 by Killian Arredondo RN  Outcome: Met This Shift  11/1/2019 1357 by Killian Arredondo RN  Outcome: Met This Shift     Problem: Pain:  Goal: Pain level will decrease  Description  Pain level will decrease  Outcome: Met This Shift     Problem: Pain:  Goal: Control of acute pain  Description  Control of acute pain  Outcome: Met This Shift     Problem: Pain:  Goal: Control of chronic pain  Description  Control of chronic pain  Outcome: Met This Shift

## 2019-11-01 NOTE — PROGRESS NOTES
Patient c/o n/v yesterday. She cancelled chemo yesterday and requested hydration today. Patient stated she took her phenergan yesterday and it helped significantly. Today she feels much better.

## 2019-11-05 ENCOUNTER — HOSPITAL ENCOUNTER (OUTPATIENT)
Dept: INFUSION THERAPY | Age: 59
Discharge: HOME | End: 2019-11-05
Payer: COMMERCIAL

## 2019-11-05 ENCOUNTER — TELEPHONE (OUTPATIENT)
Dept: ONCOLOGY | Age: 59
End: 2019-11-05

## 2019-11-05 ENCOUNTER — OFFICE VISIT (OUTPATIENT)
Dept: ONCOLOGY | Age: 59
End: 2019-11-05
Payer: COMMERCIAL

## 2019-11-05 VITALS
TEMPERATURE: 97.7 F | SYSTOLIC BLOOD PRESSURE: 117 MMHG | HEART RATE: 82 BPM | DIASTOLIC BLOOD PRESSURE: 75 MMHG | BODY MASS INDEX: 15.88 KG/M2 | WEIGHT: 101.4 LBS

## 2019-11-05 DIAGNOSIS — C25.9 PANCREATIC ADENOCARCINOMA (HCC): ICD-10-CM

## 2019-11-05 DIAGNOSIS — T45.1X5A CINV (CHEMOTHERAPY-INDUCED NAUSEA AND VOMITING): ICD-10-CM

## 2019-11-05 DIAGNOSIS — C25.1 MALIGNANT NEOPLASM OF BODY OF PANCREAS (HCC): Primary | ICD-10-CM

## 2019-11-05 DIAGNOSIS — R11.2 CINV (CHEMOTHERAPY-INDUCED NAUSEA AND VOMITING): ICD-10-CM

## 2019-11-05 LAB
ABSOLUTE EOS #: 0.1 K/UL (ref 0–0.4)
ABSOLUTE IMMATURE GRANULOCYTE: ABNORMAL K/UL (ref 0–0.3)
ABSOLUTE LYMPH #: 0.6 K/UL (ref 1–4.8)
ABSOLUTE MONO #: 0.5 K/UL (ref 0.1–1.2)
ALBUMIN SERPL-MCNC: 3.7 G/DL (ref 3.5–5.2)
ALBUMIN/GLOBULIN RATIO: 1.4 (ref 1–2.5)
ALP BLD-CCNC: 63 U/L (ref 35–104)
ALT SERPL-CCNC: 7 U/L (ref 5–33)
ANION GAP SERPL CALCULATED.3IONS-SCNC: 10 MMOL/L (ref 9–17)
AST SERPL-CCNC: 12 U/L
BASOPHILS # BLD: 3 % (ref 0–2)
BASOPHILS ABSOLUTE: 0.1 K/UL (ref 0–0.2)
BILIRUB SERPL-MCNC: 0.32 MG/DL (ref 0.3–1.2)
BUN BLDV-MCNC: 10 MG/DL (ref 6–20)
BUN/CREAT BLD: ABNORMAL (ref 9–20)
CA 19-9: ABNORMAL U/ML (ref 0–35)
CALCIUM SERPL-MCNC: 9.5 MG/DL (ref 8.6–10.4)
CHLORIDE BLD-SCNC: 104 MMOL/L (ref 98–107)
CO2: 27 MMOL/L (ref 20–31)
CREAT SERPL-MCNC: 0.48 MG/DL (ref 0.5–0.9)
DIFFERENTIAL TYPE: ABNORMAL
EOSINOPHILS RELATIVE PERCENT: 4 % (ref 1–4)
GFR AFRICAN AMERICAN: >60 ML/MIN
GFR NON-AFRICAN AMERICAN: >60 ML/MIN
GFR SERPL CREATININE-BSD FRML MDRD: ABNORMAL ML/MIN/{1.73_M2}
GFR SERPL CREATININE-BSD FRML MDRD: ABNORMAL ML/MIN/{1.73_M2}
GLUCOSE BLD-MCNC: 91 MG/DL (ref 70–99)
HCT VFR BLD CALC: 27.4 % (ref 36–46)
HEMOGLOBIN: 8.8 G/DL (ref 12–16)
IMMATURE GRANULOCYTES: ABNORMAL %
LYMPHOCYTES # BLD: 20 % (ref 24–44)
MCH RBC QN AUTO: 28.6 PG (ref 26–34)
MCHC RBC AUTO-ENTMCNC: 32 G/DL (ref 31–37)
MCV RBC AUTO: 89.4 FL (ref 80–100)
MONOCYTES # BLD: 16 % (ref 2–11)
NRBC AUTOMATED: ABNORMAL PER 100 WBC
PDW BLD-RTO: 17 % (ref 12.5–15.4)
PLATELET # BLD: 333 K/UL (ref 140–450)
PLATELET ESTIMATE: ABNORMAL
PMV BLD AUTO: 7.9 FL (ref 6–12)
POTASSIUM SERPL-SCNC: 4.1 MMOL/L (ref 3.7–5.3)
RBC # BLD: 3.07 M/UL (ref 4–5.2)
RBC # BLD: ABNORMAL 10*6/UL
SEG NEUTROPHILS: 57 % (ref 36–66)
SEGMENTED NEUTROPHILS ABSOLUTE COUNT: 1.7 K/UL (ref 1.8–7.7)
SODIUM BLD-SCNC: 141 MMOL/L (ref 135–144)
TOTAL PROTEIN: 6.4 G/DL (ref 6.4–8.3)
WBC # BLD: 3 K/UL (ref 3.5–11)
WBC # BLD: ABNORMAL 10*3/UL

## 2019-11-05 PROCEDURE — 80053 COMPREHEN METABOLIC PANEL: CPT

## 2019-11-05 PROCEDURE — 36591 DRAW BLOOD OFF VENOUS DEVICE: CPT | Performed by: NURSE PRACTITIONER

## 2019-11-05 PROCEDURE — 96413 CHEMO IV INFUSION 1 HR: CPT | Performed by: NURSE PRACTITIONER

## 2019-11-05 PROCEDURE — 96417 CHEMO IV INFUS EACH ADDL SEQ: CPT | Performed by: NURSE PRACTITIONER

## 2019-11-05 PROCEDURE — 6360000002 HC RX W HCPCS: Performed by: INTERNAL MEDICINE

## 2019-11-05 PROCEDURE — 86301 IMMUNOASSAY TUMOR CA 19-9: CPT

## 2019-11-05 PROCEDURE — 96375 TX/PRO/DX INJ NEW DRUG ADDON: CPT | Performed by: NURSE PRACTITIONER

## 2019-11-05 PROCEDURE — 99214 OFFICE O/P EST MOD 30 MIN: CPT | Performed by: INTERNAL MEDICINE

## 2019-11-05 PROCEDURE — 85025 COMPLETE CBC W/AUTO DIFF WBC: CPT

## 2019-11-05 PROCEDURE — 2580000003 HC RX 258: Performed by: INTERNAL MEDICINE

## 2019-11-05 RX ORDER — HEPARIN SODIUM (PORCINE) LOCK FLUSH IV SOLN 100 UNIT/ML 100 UNIT/ML
500 SOLUTION INTRAVENOUS PRN
Status: CANCELLED | OUTPATIENT
Start: 2019-12-19

## 2019-11-05 RX ORDER — SODIUM CHLORIDE 0.9 % (FLUSH) 0.9 %
10 SYRINGE (ML) INJECTION PRN
Status: CANCELLED | OUTPATIENT
Start: 2019-12-26

## 2019-11-05 RX ORDER — ONDANSETRON 2 MG/ML
4 INJECTION INTRAMUSCULAR; INTRAVENOUS EVERY 4 HOURS PRN
Status: CANCELLED
Start: 2019-12-19

## 2019-11-05 RX ORDER — HEPARIN SODIUM (PORCINE) LOCK FLUSH IV SOLN 100 UNIT/ML 100 UNIT/ML
500 SOLUTION INTRAVENOUS PRN
Status: CANCELLED | OUTPATIENT
Start: 2019-12-03

## 2019-11-05 RX ORDER — MEPERIDINE HYDROCHLORIDE 50 MG/ML
12.5 INJECTION INTRAMUSCULAR; INTRAVENOUS; SUBCUTANEOUS ONCE
Status: CANCELLED | OUTPATIENT
Start: 2019-12-19

## 2019-11-05 RX ORDER — SODIUM CHLORIDE 9 MG/ML
20 INJECTION, SOLUTION INTRAVENOUS ONCE
Status: CANCELLED | OUTPATIENT
Start: 2019-12-03

## 2019-11-05 RX ORDER — ONDANSETRON 2 MG/ML
4 INJECTION INTRAMUSCULAR; INTRAVENOUS EVERY 4 HOURS PRN
Status: CANCELLED
Start: 2019-12-26

## 2019-11-05 RX ORDER — DIPHENHYDRAMINE HYDROCHLORIDE 50 MG/ML
50 INJECTION INTRAMUSCULAR; INTRAVENOUS ONCE
Status: CANCELLED | OUTPATIENT
Start: 2019-12-03

## 2019-11-05 RX ORDER — SODIUM CHLORIDE 9 MG/ML
20 INJECTION, SOLUTION INTRAVENOUS ONCE
Status: CANCELLED | OUTPATIENT
Start: 2019-12-26

## 2019-11-05 RX ORDER — DIPHENHYDRAMINE HYDROCHLORIDE 50 MG/ML
50 INJECTION INTRAMUSCULAR; INTRAVENOUS ONCE
Status: CANCELLED | OUTPATIENT
Start: 2019-12-19

## 2019-11-05 RX ORDER — EPINEPHRINE 1 MG/ML
0.3 INJECTION, SOLUTION, CONCENTRATE INTRAVENOUS PRN
Status: CANCELLED | OUTPATIENT
Start: 2019-12-26

## 2019-11-05 RX ORDER — EPINEPHRINE 1 MG/ML
0.3 INJECTION, SOLUTION, CONCENTRATE INTRAVENOUS PRN
Status: CANCELLED | OUTPATIENT
Start: 2019-12-03

## 2019-11-05 RX ORDER — PACLITAXEL 100 MG/20ML
125 INJECTION, POWDER, LYOPHILIZED, FOR SUSPENSION INTRAVENOUS ONCE
Status: CANCELLED | OUTPATIENT
Start: 2019-12-26

## 2019-11-05 RX ORDER — METHYLPREDNISOLONE SODIUM SUCCINATE 125 MG/2ML
125 INJECTION, POWDER, LYOPHILIZED, FOR SOLUTION INTRAMUSCULAR; INTRAVENOUS ONCE
Status: CANCELLED | OUTPATIENT
Start: 2019-12-26

## 2019-11-05 RX ORDER — METHYLPREDNISOLONE SODIUM SUCCINATE 125 MG/2ML
125 INJECTION, POWDER, LYOPHILIZED, FOR SOLUTION INTRAMUSCULAR; INTRAVENOUS ONCE
Status: CANCELLED | OUTPATIENT
Start: 2019-12-19

## 2019-11-05 RX ORDER — SODIUM CHLORIDE 0.9 % (FLUSH) 0.9 %
5 SYRINGE (ML) INJECTION PRN
Status: CANCELLED | OUTPATIENT
Start: 2019-12-26

## 2019-11-05 RX ORDER — SODIUM CHLORIDE 0.9 % (FLUSH) 0.9 %
10 SYRINGE (ML) INJECTION PRN
Status: CANCELLED | OUTPATIENT
Start: 2019-12-19

## 2019-11-05 RX ORDER — MEPERIDINE HYDROCHLORIDE 50 MG/ML
12.5 INJECTION INTRAMUSCULAR; INTRAVENOUS; SUBCUTANEOUS ONCE
Status: CANCELLED | OUTPATIENT
Start: 2019-12-26

## 2019-11-05 RX ORDER — SODIUM CHLORIDE 0.9 % (FLUSH) 0.9 %
5 SYRINGE (ML) INJECTION PRN
Status: CANCELLED | OUTPATIENT
Start: 2019-12-03

## 2019-11-05 RX ORDER — SODIUM CHLORIDE 9 MG/ML
20 INJECTION, SOLUTION INTRAVENOUS ONCE
Status: DISCONTINUED | OUTPATIENT
Start: 2019-11-05 | End: 2019-11-06 | Stop reason: HOSPADM

## 2019-11-05 RX ORDER — SODIUM CHLORIDE 0.9 % (FLUSH) 0.9 %
5 SYRINGE (ML) INJECTION PRN
Status: CANCELLED | OUTPATIENT
Start: 2019-12-19

## 2019-11-05 RX ORDER — MEPERIDINE HYDROCHLORIDE 50 MG/ML
12.5 INJECTION INTRAMUSCULAR; INTRAVENOUS; SUBCUTANEOUS ONCE
Status: CANCELLED | OUTPATIENT
Start: 2019-12-03

## 2019-11-05 RX ORDER — SODIUM CHLORIDE 9 MG/ML
INJECTION, SOLUTION INTRAVENOUS CONTINUOUS
Status: CANCELLED | OUTPATIENT
Start: 2019-12-19

## 2019-11-05 RX ORDER — METHYLPREDNISOLONE SODIUM SUCCINATE 125 MG/2ML
125 INJECTION, POWDER, LYOPHILIZED, FOR SOLUTION INTRAMUSCULAR; INTRAVENOUS ONCE
Status: CANCELLED | OUTPATIENT
Start: 2019-12-03

## 2019-11-05 RX ORDER — SODIUM CHLORIDE 9 MG/ML
INJECTION, SOLUTION INTRAVENOUS CONTINUOUS
Status: CANCELLED | OUTPATIENT
Start: 2019-12-26

## 2019-11-05 RX ORDER — SODIUM CHLORIDE 0.9 % (FLUSH) 0.9 %
10 SYRINGE (ML) INJECTION PRN
Status: CANCELLED | OUTPATIENT
Start: 2019-12-03

## 2019-11-05 RX ORDER — SODIUM CHLORIDE 0.9 % (FLUSH) 0.9 %
10 SYRINGE (ML) INJECTION PRN
Status: DISCONTINUED | OUTPATIENT
Start: 2019-11-05 | End: 2019-11-06 | Stop reason: HOSPADM

## 2019-11-05 RX ORDER — SODIUM CHLORIDE 9 MG/ML
INJECTION, SOLUTION INTRAVENOUS CONTINUOUS
Status: CANCELLED | OUTPATIENT
Start: 2019-12-03

## 2019-11-05 RX ORDER — HEPARIN SODIUM (PORCINE) LOCK FLUSH IV SOLN 100 UNIT/ML 100 UNIT/ML
500 SOLUTION INTRAVENOUS PRN
Status: DISCONTINUED | OUTPATIENT
Start: 2019-11-05 | End: 2019-11-06 | Stop reason: HOSPADM

## 2019-11-05 RX ORDER — PACLITAXEL 100 MG/20ML
125 INJECTION, POWDER, LYOPHILIZED, FOR SUSPENSION INTRAVENOUS ONCE
Status: CANCELLED | OUTPATIENT
Start: 2019-12-03

## 2019-11-05 RX ORDER — SODIUM CHLORIDE 9 MG/ML
20 INJECTION, SOLUTION INTRAVENOUS ONCE
Status: CANCELLED | OUTPATIENT
Start: 2019-12-19

## 2019-11-05 RX ORDER — DIPHENHYDRAMINE HYDROCHLORIDE 50 MG/ML
50 INJECTION INTRAMUSCULAR; INTRAVENOUS ONCE
Status: CANCELLED | OUTPATIENT
Start: 2019-12-26

## 2019-11-05 RX ORDER — EPINEPHRINE 1 MG/ML
0.3 INJECTION, SOLUTION, CONCENTRATE INTRAVENOUS PRN
Status: CANCELLED | OUTPATIENT
Start: 2019-12-19

## 2019-11-05 RX ORDER — PACLITAXEL 100 MG/20ML
125 INJECTION, POWDER, LYOPHILIZED, FOR SUSPENSION INTRAVENOUS ONCE
Status: COMPLETED | OUTPATIENT
Start: 2019-11-05 | End: 2019-11-05

## 2019-11-05 RX ORDER — DEXAMETHASONE SODIUM PHOSPHATE 10 MG/ML
10 INJECTION INTRAMUSCULAR; INTRAVENOUS ONCE
Status: COMPLETED | OUTPATIENT
Start: 2019-11-05 | End: 2019-11-05

## 2019-11-05 RX ORDER — HEPARIN SODIUM (PORCINE) LOCK FLUSH IV SOLN 100 UNIT/ML 100 UNIT/ML
500 SOLUTION INTRAVENOUS PRN
Status: CANCELLED | OUTPATIENT
Start: 2019-12-26

## 2019-11-05 RX ORDER — ONDANSETRON 2 MG/ML
4 INJECTION INTRAMUSCULAR; INTRAVENOUS EVERY 4 HOURS PRN
Status: CANCELLED
Start: 2019-12-03

## 2019-11-05 RX ORDER — ONDANSETRON 2 MG/ML
4 INJECTION INTRAMUSCULAR; INTRAVENOUS EVERY 4 HOURS PRN
Status: DISCONTINUED | OUTPATIENT
Start: 2019-11-05 | End: 2019-11-06 | Stop reason: HOSPADM

## 2019-11-05 RX ORDER — PACLITAXEL 100 MG/20ML
125 INJECTION, POWDER, LYOPHILIZED, FOR SUSPENSION INTRAVENOUS ONCE
Status: CANCELLED | OUTPATIENT
Start: 2019-12-19

## 2019-11-05 RX ADMIN — SODIUM CHLORIDE 20 ML/HR: 9 INJECTION, SOLUTION INTRAVENOUS at 13:54

## 2019-11-05 RX ADMIN — DEXAMETHASONE SODIUM PHOSPHATE 10 MG: 10 INJECTION INTRAMUSCULAR; INTRAVENOUS at 14:21

## 2019-11-05 RX ADMIN — GEMCITABINE 1400 MG: 38 INJECTION, SOLUTION INTRAVENOUS at 15:35

## 2019-11-05 RX ADMIN — Medication 500 UNITS: at 16:13

## 2019-11-05 RX ADMIN — PACLITAXEL 182.5 MG: 100 INJECTION, POWDER, LYOPHILIZED, FOR SUSPENSION INTRAVENOUS at 14:34

## 2019-11-05 RX ADMIN — ONDANSETRON 4 MG: 2 INJECTION INTRAMUSCULAR; INTRAVENOUS at 14:18

## 2019-11-05 RX ADMIN — Medication 10 ML: at 16:11

## 2019-11-05 NOTE — FLOWSHEET NOTE
visited with Patient in the waiting area of the infusion clinic. Pt smiled and reported that she was feeling better. She expressed gratitude for the improvement in how she was feeling, including having an appetite. She expressed gratitude for her support system.  arrived with lunch for Patient. He smiled and greeted . Pt and  thanked  for the words of support and encouragement. 11/05/19 1008   Encounter Summary   Services provided to: Patient   Referral/Consult From: [address] [place] [address] Family members;Spouse;Friends/neighbors; Church/sudhakar community   Continue Visiting   (11/5/19)   Complexity of Encounter Low   Length of Encounter 15 minutes   Routine   Type Follow up   Assessment Calm; Approachable; Hopeful;Coping   Intervention Active listening;Explored feelings, thoughts, concerns;Explored coping resources;Sustaining presence/ Ministry of presence   Outcome Expressed gratitude;Coping;Encouraged; Hopeful;Receptive;Engaged in conversation     Electronically signed by Paul Denny, Oncology Outpatient [facility], [address] [facility] Radiation Oncology  11/5/2019  4:31 PM

## 2019-11-05 NOTE — PROGRESS NOTES
Patient was seen by Dr. Chicho Artis today. See his notes for visit details. Patient to receive c2d1 today per Dr. Chicho Artis. Patient tolerated chemo well.

## 2019-11-06 ENCOUNTER — TELEPHONE (OUTPATIENT)
Dept: ONCOLOGY | Age: 59
End: 2019-11-06

## 2019-11-08 ENCOUNTER — TELEPHONE (OUTPATIENT)
Dept: INFUSION THERAPY | Facility: MEDICAL CENTER | Age: 59
End: 2019-11-08

## 2019-11-12 ENCOUNTER — HOSPITAL ENCOUNTER (OUTPATIENT)
Dept: INFUSION THERAPY | Age: 59
Discharge: HOME | End: 2019-11-12
Payer: COMMERCIAL

## 2019-11-12 VITALS
HEART RATE: 100 BPM | RESPIRATION RATE: 16 BRPM | DIASTOLIC BLOOD PRESSURE: 79 MMHG | BODY MASS INDEX: 16.41 KG/M2 | SYSTOLIC BLOOD PRESSURE: 117 MMHG | WEIGHT: 104.8 LBS

## 2019-11-12 DIAGNOSIS — C25.9 PANCREATIC ADENOCARCINOMA (HCC): ICD-10-CM

## 2019-11-12 DIAGNOSIS — C25.1 MALIGNANT NEOPLASM OF BODY OF PANCREAS (HCC): Primary | ICD-10-CM

## 2019-11-12 LAB
ABSOLUTE EOS #: 0.1 K/UL (ref 0–0.4)
ABSOLUTE IMMATURE GRANULOCYTE: ABNORMAL K/UL (ref 0–0.3)
ABSOLUTE LYMPH #: 0.7 K/UL (ref 1–4.8)
ABSOLUTE MONO #: 0.5 K/UL (ref 0.1–1.2)
ALBUMIN SERPL-MCNC: 3.7 G/DL (ref 3.5–5.2)
ALBUMIN/GLOBULIN RATIO: 1.3 (ref 1–2.5)
ALP BLD-CCNC: 63 U/L (ref 35–104)
ALT SERPL-CCNC: 8 U/L (ref 5–33)
ANION GAP SERPL CALCULATED.3IONS-SCNC: 12 MMOL/L (ref 9–17)
AST SERPL-CCNC: 15 U/L
BASOPHILS # BLD: 1 % (ref 0–2)
BASOPHILS ABSOLUTE: 0 K/UL (ref 0–0.2)
BILIRUB SERPL-MCNC: 0.36 MG/DL (ref 0.3–1.2)
BUN BLDV-MCNC: 9 MG/DL (ref 6–20)
BUN/CREAT BLD: ABNORMAL (ref 9–20)
CALCIUM SERPL-MCNC: 9.5 MG/DL (ref 8.6–10.4)
CHLORIDE BLD-SCNC: 101 MMOL/L (ref 98–107)
CO2: 27 MMOL/L (ref 20–31)
CREAT SERPL-MCNC: 0.47 MG/DL (ref 0.5–0.9)
DIFFERENTIAL TYPE: ABNORMAL
EOSINOPHILS RELATIVE PERCENT: 2 % (ref 1–4)
GFR AFRICAN AMERICAN: >60 ML/MIN
GFR NON-AFRICAN AMERICAN: >60 ML/MIN
GFR SERPL CREATININE-BSD FRML MDRD: ABNORMAL ML/MIN/{1.73_M2}
GFR SERPL CREATININE-BSD FRML MDRD: ABNORMAL ML/MIN/{1.73_M2}
GLUCOSE BLD-MCNC: 126 MG/DL (ref 70–99)
HCT VFR BLD CALC: 28.9 % (ref 36–46)
HEMOGLOBIN: 9.2 G/DL (ref 12–16)
IMMATURE GRANULOCYTES: ABNORMAL %
LYMPHOCYTES # BLD: 16 % (ref 24–44)
MCH RBC QN AUTO: 28.2 PG (ref 26–34)
MCHC RBC AUTO-ENTMCNC: 31.8 G/DL (ref 31–37)
MCV RBC AUTO: 88.6 FL (ref 80–100)
MONOCYTES # BLD: 11 % (ref 2–11)
NRBC AUTOMATED: ABNORMAL PER 100 WBC
PDW BLD-RTO: 16.4 % (ref 12.5–15.4)
PLATELET # BLD: 211 K/UL (ref 140–450)
PLATELET ESTIMATE: ABNORMAL
PMV BLD AUTO: 8.4 FL (ref 6–12)
POTASSIUM SERPL-SCNC: 3.8 MMOL/L (ref 3.7–5.3)
RBC # BLD: 3.26 M/UL (ref 4–5.2)
RBC # BLD: ABNORMAL 10*6/UL
SEG NEUTROPHILS: 70 % (ref 36–66)
SEGMENTED NEUTROPHILS ABSOLUTE COUNT: 2.9 K/UL (ref 1.8–7.7)
SODIUM BLD-SCNC: 140 MMOL/L (ref 135–144)
TOTAL PROTEIN: 6.6 G/DL (ref 6.4–8.3)
WBC # BLD: 4.2 K/UL (ref 3.5–11)
WBC # BLD: ABNORMAL 10*3/UL

## 2019-11-12 PROCEDURE — 2580000003 HC RX 258: Performed by: INTERNAL MEDICINE

## 2019-11-12 PROCEDURE — 96413 CHEMO IV INFUSION 1 HR: CPT

## 2019-11-12 PROCEDURE — 36591 DRAW BLOOD OFF VENOUS DEVICE: CPT

## 2019-11-12 PROCEDURE — 96375 TX/PRO/DX INJ NEW DRUG ADDON: CPT

## 2019-11-12 PROCEDURE — 6360000002 HC RX W HCPCS: Performed by: INTERNAL MEDICINE

## 2019-11-12 PROCEDURE — 80053 COMPREHEN METABOLIC PANEL: CPT

## 2019-11-12 PROCEDURE — 85025 COMPLETE CBC W/AUTO DIFF WBC: CPT

## 2019-11-12 PROCEDURE — 96417 CHEMO IV INFUS EACH ADDL SEQ: CPT

## 2019-11-12 RX ORDER — DEXAMETHASONE SODIUM PHOSPHATE 10 MG/ML
10 INJECTION INTRAMUSCULAR; INTRAVENOUS ONCE
Status: COMPLETED | OUTPATIENT
Start: 2019-11-12 | End: 2019-11-12

## 2019-11-12 RX ORDER — HEPARIN SODIUM (PORCINE) LOCK FLUSH IV SOLN 100 UNIT/ML 100 UNIT/ML
500 SOLUTION INTRAVENOUS PRN
Status: DISCONTINUED | OUTPATIENT
Start: 2019-11-12 | End: 2019-11-13 | Stop reason: HOSPADM

## 2019-11-12 RX ORDER — SODIUM CHLORIDE 9 MG/ML
20 INJECTION, SOLUTION INTRAVENOUS ONCE
Status: COMPLETED | OUTPATIENT
Start: 2019-11-12 | End: 2019-11-12

## 2019-11-12 RX ORDER — SODIUM CHLORIDE 0.9 % (FLUSH) 0.9 %
10 SYRINGE (ML) INJECTION PRN
Status: DISCONTINUED | OUTPATIENT
Start: 2019-11-12 | End: 2019-11-13 | Stop reason: HOSPADM

## 2019-11-12 RX ORDER — PACLITAXEL 100 MG/20ML
125 INJECTION, POWDER, LYOPHILIZED, FOR SUSPENSION INTRAVENOUS ONCE
Status: COMPLETED | OUTPATIENT
Start: 2019-11-12 | End: 2019-11-12

## 2019-11-12 RX ORDER — ONDANSETRON 2 MG/ML
4 INJECTION INTRAMUSCULAR; INTRAVENOUS EVERY 4 HOURS PRN
Status: DISCONTINUED | OUTPATIENT
Start: 2019-11-12 | End: 2019-11-13 | Stop reason: HOSPADM

## 2019-11-12 RX ADMIN — Medication 10 ML: at 16:37

## 2019-11-12 RX ADMIN — DEXAMETHASONE SODIUM PHOSPHATE 10 MG: 10 INJECTION INTRAMUSCULAR; INTRAVENOUS at 14:17

## 2019-11-12 RX ADMIN — PACLITAXEL 182.5 MG: 100 INJECTION, POWDER, LYOPHILIZED, FOR SUSPENSION INTRAVENOUS at 15:18

## 2019-11-12 RX ADMIN — GEMCITABINE 1400 MG: 38 INJECTION, SOLUTION INTRAVENOUS at 15:54

## 2019-11-12 RX ADMIN — SODIUM CHLORIDE 20 ML/HR: 9 INJECTION, SOLUTION INTRAVENOUS at 14:07

## 2019-11-12 RX ADMIN — ONDANSETRON 4 MG: 2 INJECTION INTRAMUSCULAR; INTRAVENOUS at 14:12

## 2019-11-12 RX ADMIN — Medication 500 UNITS: at 16:37

## 2019-11-12 RX ADMIN — Medication 10 ML: at 13:13

## 2019-11-12 RX ADMIN — Medication 10 ML: at 13:12

## 2019-11-12 ASSESSMENT — PAIN DESCRIPTION - PROGRESSION: CLINICAL_PROGRESSION: NOT CHANGED

## 2019-11-12 ASSESSMENT — PAIN - FUNCTIONAL ASSESSMENT: PAIN_FUNCTIONAL_ASSESSMENT: PREVENTS OR INTERFERES WITH MANY ACTIVE NOT PASSIVE ACTIVITIES

## 2019-11-12 ASSESSMENT — PAIN SCALES - GENERAL: PAINLEVEL_OUTOF10: 5

## 2019-11-12 ASSESSMENT — PAIN DESCRIPTION - PAIN TYPE: TYPE: CHRONIC PAIN

## 2019-11-12 ASSESSMENT — PAIN DESCRIPTION - LOCATION: LOCATION: BACK;ABDOMEN;GENERALIZED

## 2019-11-12 ASSESSMENT — PAIN DESCRIPTION - FREQUENCY: FREQUENCY: CONTINUOUS

## 2019-11-12 ASSESSMENT — PAIN DESCRIPTION - ORIENTATION: ORIENTATION: LEFT

## 2019-11-12 ASSESSMENT — PAIN DESCRIPTION - DESCRIPTORS: DESCRIPTORS: ACHING

## 2019-11-12 ASSESSMENT — PAIN DESCRIPTION - ONSET: ONSET: ON-GOING

## 2019-11-12 NOTE — PROGRESS NOTES
Pt here for C.2D.8. Denies any new complaints. Labs drawn from port and results reviewed. Pt was treated without incident and d/c'd in stable condition. Pt will return 11/19 for treatment and Dr visit.

## 2019-11-19 ENCOUNTER — HOSPITAL ENCOUNTER (OUTPATIENT)
Dept: INFUSION THERAPY | Age: 59
Discharge: HOME | End: 2019-11-19
Payer: COMMERCIAL

## 2019-11-19 ENCOUNTER — TELEPHONE (OUTPATIENT)
Dept: ONCOLOGY | Age: 59
End: 2019-11-19

## 2019-11-19 ENCOUNTER — OFFICE VISIT (OUTPATIENT)
Dept: ONCOLOGY | Age: 59
End: 2019-11-19
Payer: COMMERCIAL

## 2019-11-19 VITALS
TEMPERATURE: 98.2 F | BODY MASS INDEX: 16.07 KG/M2 | SYSTOLIC BLOOD PRESSURE: 127 MMHG | SYSTOLIC BLOOD PRESSURE: 127 MMHG | WEIGHT: 102.6 LBS | WEIGHT: 102.6 LBS | HEART RATE: 98 BPM | DIASTOLIC BLOOD PRESSURE: 81 MMHG | TEMPERATURE: 98.2 F | DIASTOLIC BLOOD PRESSURE: 81 MMHG | BODY MASS INDEX: 16.07 KG/M2 | HEART RATE: 98 BPM | RESPIRATION RATE: 16 BRPM | RESPIRATION RATE: 16 BRPM

## 2019-11-19 DIAGNOSIS — C25.1 MALIGNANT NEOPLASM OF BODY OF PANCREAS (HCC): ICD-10-CM

## 2019-11-19 DIAGNOSIS — C25.1 MALIGNANT NEOPLASM OF BODY OF PANCREAS (HCC): Primary | ICD-10-CM

## 2019-11-19 DIAGNOSIS — R11.2 CINV (CHEMOTHERAPY-INDUCED NAUSEA AND VOMITING): ICD-10-CM

## 2019-11-19 DIAGNOSIS — T45.1X5A CINV (CHEMOTHERAPY-INDUCED NAUSEA AND VOMITING): ICD-10-CM

## 2019-11-19 DIAGNOSIS — C25.9 PANCREATIC ADENOCARCINOMA (HCC): ICD-10-CM

## 2019-11-19 LAB
ABSOLUTE EOS #: 0.1 K/UL (ref 0–0.4)
ABSOLUTE IMMATURE GRANULOCYTE: ABNORMAL K/UL (ref 0–0.3)
ABSOLUTE LYMPH #: 0.7 K/UL (ref 1–4.8)
ABSOLUTE MONO #: 0.3 K/UL (ref 0.1–1.2)
ALBUMIN SERPL-MCNC: 3.7 G/DL (ref 3.5–5.2)
ALBUMIN/GLOBULIN RATIO: 1.3 (ref 1–2.5)
ALP BLD-CCNC: 62 U/L (ref 35–104)
ALT SERPL-CCNC: 10 U/L (ref 5–33)
ANION GAP SERPL CALCULATED.3IONS-SCNC: 11 MMOL/L (ref 9–17)
AST SERPL-CCNC: 19 U/L
BASOPHILS # BLD: 0 % (ref 0–2)
BASOPHILS ABSOLUTE: 0 K/UL (ref 0–0.2)
BILIRUB SERPL-MCNC: 0.29 MG/DL (ref 0.3–1.2)
BUN BLDV-MCNC: 11 MG/DL (ref 6–20)
BUN/CREAT BLD: ABNORMAL (ref 9–20)
CALCIUM SERPL-MCNC: 9.3 MG/DL (ref 8.6–10.4)
CHLORIDE BLD-SCNC: 102 MMOL/L (ref 98–107)
CO2: 25 MMOL/L (ref 20–31)
CREAT SERPL-MCNC: 0.44 MG/DL (ref 0.5–0.9)
DIFFERENTIAL TYPE: ABNORMAL
EOSINOPHILS RELATIVE PERCENT: 3 % (ref 1–4)
GFR AFRICAN AMERICAN: >60 ML/MIN
GFR NON-AFRICAN AMERICAN: >60 ML/MIN
GFR SERPL CREATININE-BSD FRML MDRD: ABNORMAL ML/MIN/{1.73_M2}
GFR SERPL CREATININE-BSD FRML MDRD: ABNORMAL ML/MIN/{1.73_M2}
GLUCOSE BLD-MCNC: 104 MG/DL (ref 70–99)
HCT VFR BLD CALC: 27.3 % (ref 36–46)
HEMOGLOBIN: 8.5 G/DL (ref 12–16)
IMMATURE GRANULOCYTES: ABNORMAL %
LYMPHOCYTES # BLD: 21 % (ref 24–44)
MCH RBC QN AUTO: 27.5 PG (ref 26–34)
MCHC RBC AUTO-ENTMCNC: 31.3 G/DL (ref 31–37)
MCV RBC AUTO: 87.9 FL (ref 80–100)
MONOCYTES # BLD: 10 % (ref 2–11)
NRBC AUTOMATED: ABNORMAL PER 100 WBC
PDW BLD-RTO: 16.2 % (ref 12.5–15.4)
PLATELET # BLD: 165 K/UL (ref 140–450)
PLATELET ESTIMATE: ABNORMAL
PMV BLD AUTO: 8.7 FL (ref 6–12)
POTASSIUM SERPL-SCNC: 3.2 MMOL/L (ref 3.7–5.3)
RBC # BLD: 3.11 M/UL (ref 4–5.2)
RBC # BLD: ABNORMAL 10*6/UL
SEG NEUTROPHILS: 66 % (ref 36–66)
SEGMENTED NEUTROPHILS ABSOLUTE COUNT: 2.1 K/UL (ref 1.8–7.7)
SODIUM BLD-SCNC: 138 MMOL/L (ref 135–144)
TOTAL PROTEIN: 6.5 G/DL (ref 6.4–8.3)
WBC # BLD: 3.3 K/UL (ref 3.5–11)
WBC # BLD: ABNORMAL 10*3/UL

## 2019-11-19 PROCEDURE — 96413 CHEMO IV INFUSION 1 HR: CPT

## 2019-11-19 PROCEDURE — 96375 TX/PRO/DX INJ NEW DRUG ADDON: CPT

## 2019-11-19 PROCEDURE — 80053 COMPREHEN METABOLIC PANEL: CPT

## 2019-11-19 PROCEDURE — 36591 DRAW BLOOD OFF VENOUS DEVICE: CPT

## 2019-11-19 PROCEDURE — 2580000003 HC RX 258: Performed by: INTERNAL MEDICINE

## 2019-11-19 PROCEDURE — 99214 OFFICE O/P EST MOD 30 MIN: CPT | Performed by: INTERNAL MEDICINE

## 2019-11-19 PROCEDURE — 96417 CHEMO IV INFUS EACH ADDL SEQ: CPT

## 2019-11-19 PROCEDURE — 6360000002 HC RX W HCPCS: Performed by: INTERNAL MEDICINE

## 2019-11-19 PROCEDURE — 6370000000 HC RX 637 (ALT 250 FOR IP): Performed by: INTERNAL MEDICINE

## 2019-11-19 PROCEDURE — 85025 COMPLETE CBC W/AUTO DIFF WBC: CPT

## 2019-11-19 RX ORDER — OXYCODONE HCL 10 MG/1
10 TABLET, FILM COATED, EXTENDED RELEASE ORAL EVERY 12 HOURS
Qty: 60 TABLET | Refills: 0 | Status: CANCELLED | OUTPATIENT
Start: 2019-11-19 | End: 2019-12-19

## 2019-11-19 RX ORDER — DEXAMETHASONE SODIUM PHOSPHATE 10 MG/ML
10 INJECTION INTRAMUSCULAR; INTRAVENOUS ONCE
Status: COMPLETED | OUTPATIENT
Start: 2019-11-19 | End: 2019-11-19

## 2019-11-19 RX ORDER — OXYCODONE HCL 10 MG/1
10 TABLET, FILM COATED, EXTENDED RELEASE ORAL EVERY 12 HOURS
Qty: 60 TABLET | Refills: 0 | Status: SHIPPED | OUTPATIENT
Start: 2019-11-19 | End: 2019-11-19 | Stop reason: SDUPTHER

## 2019-11-19 RX ORDER — OXYCODONE HCL 10 MG/1
10 TABLET, FILM COATED, EXTENDED RELEASE ORAL EVERY 12 HOURS
Qty: 60 TABLET | Refills: 0 | Status: SHIPPED | OUTPATIENT
Start: 2019-11-19 | End: 2019-11-22 | Stop reason: SDUPTHER

## 2019-11-19 RX ORDER — SODIUM CHLORIDE 9 MG/ML
20 INJECTION, SOLUTION INTRAVENOUS ONCE
Status: DISCONTINUED | OUTPATIENT
Start: 2019-11-19 | End: 2019-11-20 | Stop reason: HOSPADM

## 2019-11-19 RX ORDER — ONDANSETRON 2 MG/ML
4 INJECTION INTRAMUSCULAR; INTRAVENOUS ONCE
Status: COMPLETED | OUTPATIENT
Start: 2019-11-19 | End: 2019-11-19

## 2019-11-19 RX ORDER — POTASSIUM CHLORIDE 20 MEQ/1
40 TABLET, EXTENDED RELEASE ORAL ONCE
Status: COMPLETED | OUTPATIENT
Start: 2019-11-19 | End: 2019-11-19

## 2019-11-19 RX ORDER — HEPARIN SODIUM (PORCINE) LOCK FLUSH IV SOLN 100 UNIT/ML 100 UNIT/ML
500 SOLUTION INTRAVENOUS PRN
Status: DISCONTINUED | OUTPATIENT
Start: 2019-11-19 | End: 2019-11-20 | Stop reason: HOSPADM

## 2019-11-19 RX ORDER — SODIUM CHLORIDE 0.9 % (FLUSH) 0.9 %
10 SYRINGE (ML) INJECTION PRN
Status: DISCONTINUED | OUTPATIENT
Start: 2019-11-19 | End: 2019-11-20 | Stop reason: HOSPADM

## 2019-11-19 RX ORDER — PACLITAXEL 100 MG/20ML
180 INJECTION, POWDER, LYOPHILIZED, FOR SUSPENSION INTRAVENOUS ONCE
Status: COMPLETED | OUTPATIENT
Start: 2019-11-19 | End: 2019-11-19

## 2019-11-19 RX ADMIN — HEPARIN 500 UNITS: 100 SYRINGE at 16:16

## 2019-11-19 RX ADMIN — Medication 10 ML: at 12:56

## 2019-11-19 RX ADMIN — GEMCITABINE 1400 MG: 38 INJECTION, SOLUTION INTRAVENOUS at 15:40

## 2019-11-19 RX ADMIN — POTASSIUM CHLORIDE 40 MEQ: 1500 TABLET, EXTENDED RELEASE ORAL at 13:38

## 2019-11-19 RX ADMIN — SODIUM CHLORIDE 20 ML/HR: 9 INJECTION, SOLUTION INTRAVENOUS at 13:37

## 2019-11-19 RX ADMIN — ONDANSETRON 4 MG: 2 INJECTION, SOLUTION INTRAMUSCULAR; INTRAVENOUS at 13:37

## 2019-11-19 RX ADMIN — Medication 10 ML: at 16:16

## 2019-11-19 RX ADMIN — Medication 10 MG: at 13:39

## 2019-11-19 RX ADMIN — PACLITAXEL 180 MG: 100 INJECTION, POWDER, LYOPHILIZED, FOR SUSPENSION INTRAVENOUS at 14:23

## 2019-11-19 ASSESSMENT — PAIN DESCRIPTION - LOCATION: LOCATION: GENERALIZED

## 2019-11-19 ASSESSMENT — PAIN SCALES - GENERAL: PAINLEVEL_OUTOF10: 3

## 2019-11-19 NOTE — PROGRESS NOTES
Patient here for Abraxane/Gemzar C2D15. Labs drawn from port and reviewed. K+ 3.2 replenished per pharmacy sliding scale. She was given handout for K+ rich foods. She saw Dr. Shahab Treadwell today see his dictation. She tolerated treatment well and was discharged home in stable condition with spouse. She is due to return 12/3 for C3D1.

## 2019-11-21 DIAGNOSIS — C25.1 MALIGNANT NEOPLASM OF BODY OF PANCREAS (HCC): ICD-10-CM

## 2019-11-21 DIAGNOSIS — T45.1X5A CINV (CHEMOTHERAPY-INDUCED NAUSEA AND VOMITING): ICD-10-CM

## 2019-11-21 DIAGNOSIS — R11.2 CINV (CHEMOTHERAPY-INDUCED NAUSEA AND VOMITING): ICD-10-CM

## 2019-11-22 RX ORDER — OXYCODONE HCL 10 MG/1
10 TABLET, FILM COATED, EXTENDED RELEASE ORAL EVERY 12 HOURS
Qty: 60 TABLET | Refills: 0 | Status: SHIPPED | OUTPATIENT
Start: 2019-11-22 | End: 2019-12-12

## 2019-12-12 ENCOUNTER — TELEPHONE (OUTPATIENT)
Dept: PALLATIVE CARE | Age: 59
End: 2019-12-12

## 2019-12-12 ENCOUNTER — TELEPHONE (OUTPATIENT)
Dept: ONCOLOGY | Age: 59
End: 2019-12-12

## 2019-12-12 ENCOUNTER — HOSPITAL ENCOUNTER (OUTPATIENT)
Dept: INFUSION THERAPY | Age: 59
Discharge: HOME | End: 2019-12-12
Payer: COMMERCIAL

## 2019-12-12 ENCOUNTER — OFFICE VISIT (OUTPATIENT)
Dept: ONCOLOGY | Age: 59
End: 2019-12-12
Payer: COMMERCIAL

## 2019-12-12 VITALS
BODY MASS INDEX: 15.88 KG/M2 | WEIGHT: 101.4 LBS | DIASTOLIC BLOOD PRESSURE: 84 MMHG | SYSTOLIC BLOOD PRESSURE: 132 MMHG | HEART RATE: 99 BPM | TEMPERATURE: 97.9 F

## 2019-12-12 VITALS
BODY MASS INDEX: 15.88 KG/M2 | SYSTOLIC BLOOD PRESSURE: 132 MMHG | WEIGHT: 101.4 LBS | HEART RATE: 99 BPM | DIASTOLIC BLOOD PRESSURE: 84 MMHG | RESPIRATION RATE: 16 BRPM | TEMPERATURE: 97.9 F

## 2019-12-12 DIAGNOSIS — C25.1 MALIGNANT NEOPLASM OF BODY OF PANCREAS (HCC): Primary | ICD-10-CM

## 2019-12-12 DIAGNOSIS — R11.2 CINV (CHEMOTHERAPY-INDUCED NAUSEA AND VOMITING): ICD-10-CM

## 2019-12-12 DIAGNOSIS — C25.9 PANCREATIC ADENOCARCINOMA (HCC): ICD-10-CM

## 2019-12-12 DIAGNOSIS — C25.1 MALIGNANT NEOPLASM OF BODY OF PANCREAS (HCC): ICD-10-CM

## 2019-12-12 DIAGNOSIS — T45.1X5A CINV (CHEMOTHERAPY-INDUCED NAUSEA AND VOMITING): ICD-10-CM

## 2019-12-12 LAB
ABSOLUTE EOS #: 0.2 K/UL (ref 0–0.4)
ABSOLUTE IMMATURE GRANULOCYTE: ABNORMAL K/UL (ref 0–0.3)
ABSOLUTE LYMPH #: 0.6 K/UL (ref 1–4.8)
ABSOLUTE MONO #: 0.7 K/UL (ref 0.1–1.2)
ALBUMIN SERPL-MCNC: 3.8 G/DL (ref 3.5–5.2)
ALBUMIN/GLOBULIN RATIO: 1.2 (ref 1–2.5)
ALP BLD-CCNC: 84 U/L (ref 35–104)
ALT SERPL-CCNC: 14 U/L (ref 5–33)
ANION GAP SERPL CALCULATED.3IONS-SCNC: 10 MMOL/L (ref 9–17)
AST SERPL-CCNC: 22 U/L
BASOPHILS # BLD: 2 % (ref 0–2)
BASOPHILS ABSOLUTE: 0.1 K/UL (ref 0–0.2)
BILIRUB SERPL-MCNC: 0.5 MG/DL (ref 0.3–1.2)
BUN BLDV-MCNC: 10 MG/DL (ref 6–20)
BUN/CREAT BLD: ABNORMAL (ref 9–20)
CALCIUM SERPL-MCNC: 9.7 MG/DL (ref 8.6–10.4)
CHLORIDE BLD-SCNC: 99 MMOL/L (ref 98–107)
CO2: 27 MMOL/L (ref 20–31)
CREAT SERPL-MCNC: 0.44 MG/DL (ref 0.5–0.9)
DIFFERENTIAL TYPE: ABNORMAL
EOSINOPHILS RELATIVE PERCENT: 3 % (ref 1–4)
GFR AFRICAN AMERICAN: >60 ML/MIN
GFR NON-AFRICAN AMERICAN: >60 ML/MIN
GFR SERPL CREATININE-BSD FRML MDRD: ABNORMAL ML/MIN/{1.73_M2}
GFR SERPL CREATININE-BSD FRML MDRD: ABNORMAL ML/MIN/{1.73_M2}
GLUCOSE BLD-MCNC: 111 MG/DL (ref 70–99)
HCT VFR BLD CALC: 29.4 % (ref 36–46)
HEMOGLOBIN: 9.3 G/DL (ref 12–16)
IMMATURE GRANULOCYTES: ABNORMAL %
LYMPHOCYTES # BLD: 10 % (ref 24–44)
MCH RBC QN AUTO: 27 PG (ref 26–34)
MCHC RBC AUTO-ENTMCNC: 31.8 G/DL (ref 31–37)
MCV RBC AUTO: 84.9 FL (ref 80–100)
MONOCYTES # BLD: 12 % (ref 2–11)
NRBC AUTOMATED: ABNORMAL PER 100 WBC
PDW BLD-RTO: 17.4 % (ref 12.5–15.4)
PLATELET # BLD: 297 K/UL (ref 140–450)
PLATELET ESTIMATE: ABNORMAL
PMV BLD AUTO: 8.5 FL (ref 6–12)
POTASSIUM SERPL-SCNC: 4.4 MMOL/L (ref 3.7–5.3)
RBC # BLD: 3.46 M/UL (ref 4–5.2)
RBC # BLD: ABNORMAL 10*6/UL
SEG NEUTROPHILS: 73 % (ref 36–66)
SEGMENTED NEUTROPHILS ABSOLUTE COUNT: 4.2 K/UL (ref 1.8–7.7)
SODIUM BLD-SCNC: 136 MMOL/L (ref 135–144)
TOTAL PROTEIN: 6.9 G/DL (ref 6.4–8.3)
WBC # BLD: 5.8 K/UL (ref 3.5–11)
WBC # BLD: ABNORMAL 10*3/UL

## 2019-12-12 PROCEDURE — 85025 COMPLETE CBC W/AUTO DIFF WBC: CPT

## 2019-12-12 PROCEDURE — 6360000002 HC RX W HCPCS: Performed by: INTERNAL MEDICINE

## 2019-12-12 PROCEDURE — 36591 DRAW BLOOD OFF VENOUS DEVICE: CPT

## 2019-12-12 PROCEDURE — 96417 CHEMO IV INFUS EACH ADDL SEQ: CPT

## 2019-12-12 PROCEDURE — 96375 TX/PRO/DX INJ NEW DRUG ADDON: CPT

## 2019-12-12 PROCEDURE — 80053 COMPREHEN METABOLIC PANEL: CPT

## 2019-12-12 PROCEDURE — 96413 CHEMO IV INFUSION 1 HR: CPT

## 2019-12-12 PROCEDURE — 2580000003 HC RX 258: Performed by: INTERNAL MEDICINE

## 2019-12-12 PROCEDURE — 99214 OFFICE O/P EST MOD 30 MIN: CPT | Performed by: INTERNAL MEDICINE

## 2019-12-12 RX ORDER — PROMETHAZINE HYDROCHLORIDE 12.5 MG/1
25 TABLET ORAL EVERY 6 HOURS PRN
Qty: 90 TABLET | Refills: 0 | Status: SHIPPED | OUTPATIENT
Start: 2019-12-12

## 2019-12-12 RX ORDER — EPINEPHRINE 1 MG/ML
0.3 INJECTION, SOLUTION, CONCENTRATE INTRAVENOUS PRN
Status: CANCELLED | OUTPATIENT
Start: 2019-12-12

## 2019-12-12 RX ORDER — OXYCODONE HYDROCHLORIDE 10 MG/1
10 TABLET ORAL EVERY 6 HOURS PRN
Qty: 120 TABLET | Refills: 0 | Status: SHIPPED | OUTPATIENT
Start: 2019-12-12 | End: 2020-01-21 | Stop reason: SDUPTHER

## 2019-12-12 RX ORDER — ONDANSETRON 2 MG/ML
4 INJECTION INTRAMUSCULAR; INTRAVENOUS EVERY 4 HOURS PRN
Status: CANCELLED
Start: 2019-12-12

## 2019-12-12 RX ORDER — DEXAMETHASONE SODIUM PHOSPHATE 10 MG/ML
10 INJECTION INTRAMUSCULAR; INTRAVENOUS ONCE
Status: COMPLETED | OUTPATIENT
Start: 2019-12-12 | End: 2019-12-12

## 2019-12-12 RX ORDER — PROMETHAZINE HYDROCHLORIDE 25 MG/ML
12.5 INJECTION, SOLUTION INTRAMUSCULAR; INTRAVENOUS ONCE
Status: DISCONTINUED | OUTPATIENT
Start: 2019-12-12 | End: 2019-12-12

## 2019-12-12 RX ORDER — SODIUM CHLORIDE 9 MG/ML
INJECTION, SOLUTION INTRAVENOUS CONTINUOUS
Status: CANCELLED | OUTPATIENT
Start: 2019-12-12

## 2019-12-12 RX ORDER — ONDANSETRON 2 MG/ML
4 INJECTION INTRAMUSCULAR; INTRAVENOUS EVERY 4 HOURS PRN
Status: DISCONTINUED | OUTPATIENT
Start: 2019-12-12 | End: 2019-12-13 | Stop reason: HOSPADM

## 2019-12-12 RX ORDER — PACLITAXEL 100 MG/20ML
125 INJECTION, POWDER, LYOPHILIZED, FOR SUSPENSION INTRAVENOUS ONCE
Status: COMPLETED | OUTPATIENT
Start: 2019-12-12 | End: 2019-12-12

## 2019-12-12 RX ORDER — METHYLPREDNISOLONE SODIUM SUCCINATE 125 MG/2ML
125 INJECTION, POWDER, LYOPHILIZED, FOR SOLUTION INTRAMUSCULAR; INTRAVENOUS ONCE
Status: CANCELLED | OUTPATIENT
Start: 2019-12-12

## 2019-12-12 RX ORDER — MEPERIDINE HYDROCHLORIDE 50 MG/ML
12.5 INJECTION INTRAMUSCULAR; INTRAVENOUS; SUBCUTANEOUS ONCE
Status: CANCELLED | OUTPATIENT
Start: 2019-12-12

## 2019-12-12 RX ORDER — SODIUM CHLORIDE 0.9 % (FLUSH) 0.9 %
5 SYRINGE (ML) INJECTION PRN
Status: CANCELLED | OUTPATIENT
Start: 2019-12-12

## 2019-12-12 RX ORDER — DIPHENHYDRAMINE HYDROCHLORIDE 50 MG/ML
50 INJECTION INTRAMUSCULAR; INTRAVENOUS ONCE
Status: CANCELLED | OUTPATIENT
Start: 2019-12-12

## 2019-12-12 RX ORDER — SODIUM CHLORIDE 0.9 % (FLUSH) 0.9 %
10 SYRINGE (ML) INJECTION PRN
Status: DISCONTINUED | OUTPATIENT
Start: 2019-12-12 | End: 2019-12-13 | Stop reason: HOSPADM

## 2019-12-12 RX ORDER — SODIUM CHLORIDE 9 MG/ML
20 INJECTION, SOLUTION INTRAVENOUS ONCE
Status: CANCELLED | OUTPATIENT
Start: 2019-12-12

## 2019-12-12 RX ORDER — PACLITAXEL 100 MG/20ML
125 INJECTION, POWDER, LYOPHILIZED, FOR SUSPENSION INTRAVENOUS ONCE
Status: CANCELLED | OUTPATIENT
Start: 2019-12-12

## 2019-12-12 RX ORDER — HEPARIN SODIUM (PORCINE) LOCK FLUSH IV SOLN 100 UNIT/ML 100 UNIT/ML
500 SOLUTION INTRAVENOUS PRN
Status: DISCONTINUED | OUTPATIENT
Start: 2019-12-12 | End: 2019-12-13 | Stop reason: HOSPADM

## 2019-12-12 RX ORDER — SODIUM CHLORIDE 9 MG/ML
20 INJECTION, SOLUTION INTRAVENOUS ONCE
Status: COMPLETED | OUTPATIENT
Start: 2019-12-12 | End: 2019-12-12

## 2019-12-12 RX ORDER — PROMETHAZINE HYDROCHLORIDE 25 MG/ML
12.5 INJECTION, SOLUTION INTRAMUSCULAR; INTRAVENOUS ONCE
Status: COMPLETED | OUTPATIENT
Start: 2019-12-12 | End: 2019-12-12

## 2019-12-12 RX ORDER — OXYCODONE HCL 20 MG/1
20 TABLET, FILM COATED, EXTENDED RELEASE ORAL 2 TIMES DAILY
Qty: 60 TABLET | Refills: 0 | Status: SHIPPED | OUTPATIENT
Start: 2019-12-12 | End: 2020-01-21

## 2019-12-12 RX ADMIN — Medication 10 ML: at 12:52

## 2019-12-12 RX ADMIN — ONDANSETRON 4 MG: 2 INJECTION INTRAMUSCULAR; INTRAVENOUS at 14:17

## 2019-12-12 RX ADMIN — GEMCITABINE 1400 MG: 38 INJECTION, SOLUTION INTRAVENOUS at 15:30

## 2019-12-12 RX ADMIN — Medication 10 ML: at 12:53

## 2019-12-12 RX ADMIN — Medication 10 ML: at 16:10

## 2019-12-12 RX ADMIN — HEPARIN 500 UNITS: 100 SYRINGE at 16:10

## 2019-12-12 RX ADMIN — Medication 10 ML: at 12:55

## 2019-12-12 RX ADMIN — PROMETHAZINE HYDROCHLORIDE 12.5 MG: 25 INJECTION INTRAMUSCULAR; INTRAVENOUS at 14:37

## 2019-12-12 RX ADMIN — Medication 10 ML: at 12:51

## 2019-12-12 RX ADMIN — SODIUM CHLORIDE 20 ML/HR: 9 INJECTION, SOLUTION INTRAVENOUS at 14:17

## 2019-12-12 RX ADMIN — Medication 10 ML: at 12:50

## 2019-12-12 RX ADMIN — Medication 10 ML: at 12:56

## 2019-12-12 RX ADMIN — PACLITAXEL 182.5 MG: 100 INJECTION, POWDER, LYOPHILIZED, FOR SUSPENSION INTRAVENOUS at 14:39

## 2019-12-12 RX ADMIN — DEXAMETHASONE SODIUM PHOSPHATE 10 MG: 10 INJECTION INTRAMUSCULAR; INTRAVENOUS at 14:20

## 2019-12-12 RX ADMIN — Medication 10 ML: at 12:54

## 2019-12-12 ASSESSMENT — PAIN DESCRIPTION - LOCATION: LOCATION: ABDOMEN

## 2019-12-12 ASSESSMENT — PAIN SCALES - GENERAL: PAINLEVEL_OUTOF10: 5

## 2019-12-12 ASSESSMENT — PAIN DESCRIPTION - PAIN TYPE: TYPE: CHRONIC PAIN

## 2019-12-12 NOTE — PROGRESS NOTES
Pt here for C3D1 Abraxane and Gemzar. Denies any new complaints. Labs drawn from port and results reviewed. Pt seen by Dr Bairon Baum at chairside for follow up, refer to his note. Pt stated that the premed Zofran does not help with nausea during treatments and requested phenergan which is her home RX, spoke with Bernadine Divers and she will change treatment plan. Pt was treated without incident and d/c'd in stable condition. Pt will return on 12-19-19 for C3D8.

## 2019-12-16 ENCOUNTER — TELEPHONE (OUTPATIENT)
Dept: ONCOLOGY | Age: 59
End: 2019-12-16

## 2019-12-19 ENCOUNTER — TELEPHONE (OUTPATIENT)
Dept: ONCOLOGY | Age: 59
End: 2019-12-19

## 2019-12-19 ENCOUNTER — OFFICE VISIT (OUTPATIENT)
Dept: ONCOLOGY | Age: 59
End: 2019-12-19
Payer: COMMERCIAL

## 2019-12-19 ENCOUNTER — TELEPHONE (OUTPATIENT)
Dept: PALLATIVE CARE | Age: 59
End: 2019-12-19

## 2019-12-19 ENCOUNTER — HOSPITAL ENCOUNTER (OUTPATIENT)
Dept: INFUSION THERAPY | Age: 59
Discharge: HOME | End: 2019-12-19
Payer: COMMERCIAL

## 2019-12-19 VITALS
DIASTOLIC BLOOD PRESSURE: 70 MMHG | BODY MASS INDEX: 14.85 KG/M2 | HEIGHT: 67 IN | HEART RATE: 104 BPM | TEMPERATURE: 97.5 F | WEIGHT: 94.6 LBS | RESPIRATION RATE: 16 BRPM | SYSTOLIC BLOOD PRESSURE: 102 MMHG

## 2019-12-19 VITALS
HEART RATE: 104 BPM | BODY MASS INDEX: 14.72 KG/M2 | DIASTOLIC BLOOD PRESSURE: 70 MMHG | WEIGHT: 94 LBS | SYSTOLIC BLOOD PRESSURE: 102 MMHG | TEMPERATURE: 97.5 F

## 2019-12-19 DIAGNOSIS — C25.1 MALIGNANT NEOPLASM OF BODY OF PANCREAS (HCC): Primary | ICD-10-CM

## 2019-12-19 DIAGNOSIS — C25.9 PANCREATIC ADENOCARCINOMA (HCC): ICD-10-CM

## 2019-12-19 LAB
ABSOLUTE EOS #: 0.1 K/UL (ref 0–0.4)
ABSOLUTE IMMATURE GRANULOCYTE: ABNORMAL K/UL (ref 0–0.3)
ABSOLUTE LYMPH #: 0.9 K/UL (ref 1–4.8)
ABSOLUTE MONO #: 0.4 K/UL (ref 0.1–1.2)
ALBUMIN SERPL-MCNC: 4 G/DL (ref 3.5–5.2)
ALBUMIN/GLOBULIN RATIO: 1.3 (ref 1–2.5)
ALP BLD-CCNC: 72 U/L (ref 35–104)
ALT SERPL-CCNC: 17 U/L (ref 5–33)
ANION GAP SERPL CALCULATED.3IONS-SCNC: 13 MMOL/L (ref 9–17)
AST SERPL-CCNC: 26 U/L
BASOPHILS # BLD: 1 % (ref 0–2)
BASOPHILS ABSOLUTE: 0 K/UL (ref 0–0.2)
BILIRUB SERPL-MCNC: 0.44 MG/DL (ref 0.3–1.2)
BUN BLDV-MCNC: 12 MG/DL (ref 6–20)
BUN/CREAT BLD: ABNORMAL (ref 9–20)
CA 19-9: 5168 U/ML (ref 0–35)
CALCIUM SERPL-MCNC: 9.4 MG/DL (ref 8.6–10.4)
CHLORIDE BLD-SCNC: 100 MMOL/L (ref 98–107)
CO2: 26 MMOL/L (ref 20–31)
CREAT SERPL-MCNC: 0.51 MG/DL (ref 0.5–0.9)
DIFFERENTIAL TYPE: ABNORMAL
EOSINOPHILS RELATIVE PERCENT: 2 % (ref 1–4)
GFR AFRICAN AMERICAN: >60 ML/MIN
GFR NON-AFRICAN AMERICAN: >60 ML/MIN
GFR SERPL CREATININE-BSD FRML MDRD: ABNORMAL ML/MIN/{1.73_M2}
GFR SERPL CREATININE-BSD FRML MDRD: ABNORMAL ML/MIN/{1.73_M2}
GLUCOSE BLD-MCNC: 113 MG/DL (ref 70–99)
HCT VFR BLD CALC: 27.3 % (ref 36–46)
HEMOGLOBIN: 8.6 G/DL (ref 12–16)
IMMATURE GRANULOCYTES: ABNORMAL %
LYMPHOCYTES # BLD: 30 % (ref 24–44)
MCH RBC QN AUTO: 26.3 PG (ref 26–34)
MCHC RBC AUTO-ENTMCNC: 31.5 G/DL (ref 31–37)
MCV RBC AUTO: 83.7 FL (ref 80–100)
MONOCYTES # BLD: 14 % (ref 2–11)
NRBC AUTOMATED: ABNORMAL PER 100 WBC
PDW BLD-RTO: 17.1 % (ref 12.5–15.4)
PLATELET # BLD: 205 K/UL (ref 140–450)
PLATELET ESTIMATE: ABNORMAL
PMV BLD AUTO: 8.2 FL (ref 6–12)
POTASSIUM SERPL-SCNC: 4 MMOL/L (ref 3.7–5.3)
RBC # BLD: 3.26 M/UL (ref 4–5.2)
RBC # BLD: ABNORMAL 10*6/UL
SEG NEUTROPHILS: 53 % (ref 36–66)
SEGMENTED NEUTROPHILS ABSOLUTE COUNT: 1.5 K/UL (ref 1.8–7.7)
SODIUM BLD-SCNC: 139 MMOL/L (ref 135–144)
TOTAL PROTEIN: 7.1 G/DL (ref 6.4–8.3)
WBC # BLD: 2.9 K/UL (ref 3.5–11)
WBC # BLD: ABNORMAL 10*3/UL

## 2019-12-19 PROCEDURE — 96417 CHEMO IV INFUS EACH ADDL SEQ: CPT

## 2019-12-19 PROCEDURE — 96375 TX/PRO/DX INJ NEW DRUG ADDON: CPT

## 2019-12-19 PROCEDURE — 2580000003 HC RX 258: Performed by: INTERNAL MEDICINE

## 2019-12-19 PROCEDURE — 80053 COMPREHEN METABOLIC PANEL: CPT

## 2019-12-19 PROCEDURE — 96413 CHEMO IV INFUSION 1 HR: CPT

## 2019-12-19 PROCEDURE — 86301 IMMUNOASSAY TUMOR CA 19-9: CPT

## 2019-12-19 PROCEDURE — 99214 OFFICE O/P EST MOD 30 MIN: CPT | Performed by: INTERNAL MEDICINE

## 2019-12-19 PROCEDURE — 85025 COMPLETE CBC W/AUTO DIFF WBC: CPT

## 2019-12-19 PROCEDURE — 36591 DRAW BLOOD OFF VENOUS DEVICE: CPT

## 2019-12-19 PROCEDURE — 6360000002 HC RX W HCPCS: Performed by: INTERNAL MEDICINE

## 2019-12-19 RX ORDER — PROMETHAZINE HYDROCHLORIDE 25 MG/ML
12.5 INJECTION, SOLUTION INTRAMUSCULAR; INTRAVENOUS ONCE
Status: COMPLETED | OUTPATIENT
Start: 2019-12-19 | End: 2019-12-19

## 2019-12-19 RX ORDER — DEXAMETHASONE SODIUM PHOSPHATE 10 MG/ML
10 INJECTION, SOLUTION INTRAMUSCULAR; INTRAVENOUS ONCE
Status: COMPLETED | OUTPATIENT
Start: 2019-12-19 | End: 2019-12-19

## 2019-12-19 RX ORDER — HEPARIN SODIUM (PORCINE) LOCK FLUSH IV SOLN 100 UNIT/ML 100 UNIT/ML
500 SOLUTION INTRAVENOUS PRN
Status: DISCONTINUED | OUTPATIENT
Start: 2019-12-19 | End: 2019-12-20 | Stop reason: HOSPADM

## 2019-12-19 RX ORDER — PACLITAXEL 100 MG/20ML
180 INJECTION, POWDER, LYOPHILIZED, FOR SUSPENSION INTRAVENOUS ONCE
Status: COMPLETED | OUTPATIENT
Start: 2019-12-19 | End: 2019-12-19

## 2019-12-19 RX ORDER — SODIUM CHLORIDE 0.9 % (FLUSH) 0.9 %
10 SYRINGE (ML) INJECTION PRN
Status: DISCONTINUED | OUTPATIENT
Start: 2019-12-19 | End: 2019-12-20 | Stop reason: HOSPADM

## 2019-12-19 RX ORDER — SODIUM CHLORIDE 9 MG/ML
20 INJECTION, SOLUTION INTRAVENOUS ONCE
Status: COMPLETED | OUTPATIENT
Start: 2019-12-19 | End: 2019-12-19

## 2019-12-19 RX ADMIN — DEXAMETHASONE SODIUM PHOSPHATE 10 MG: 10 INJECTION INTRAMUSCULAR; INTRAVENOUS at 13:52

## 2019-12-19 RX ADMIN — Medication 10 ML: at 15:30

## 2019-12-19 RX ADMIN — Medication 10 ML: at 13:01

## 2019-12-19 RX ADMIN — GEMCITABINE 1400 MG: 38 INJECTION, SOLUTION INTRAVENOUS at 14:46

## 2019-12-19 RX ADMIN — Medication 10 ML: at 15:31

## 2019-12-19 RX ADMIN — PACLITAXEL 180 MG: 100 INJECTION, POWDER, LYOPHILIZED, FOR SUSPENSION INTRAVENOUS at 14:15

## 2019-12-19 RX ADMIN — Medication 10 ML: at 13:00

## 2019-12-19 RX ADMIN — PROMETHAZINE HYDROCHLORIDE 12.5 MG: 25 INJECTION INTRAMUSCULAR; INTRAVENOUS at 13:45

## 2019-12-19 RX ADMIN — SODIUM CHLORIDE 20 ML/HR: 9 INJECTION, SOLUTION INTRAVENOUS at 13:43

## 2019-12-19 RX ADMIN — HEPARIN 500 UNITS: 100 SYRINGE at 15:31

## 2019-12-19 ASSESSMENT — PAIN DESCRIPTION - FREQUENCY: FREQUENCY: CONTINUOUS

## 2019-12-19 ASSESSMENT — PAIN SCALES - GENERAL: PAINLEVEL_OUTOF10: 4

## 2019-12-19 ASSESSMENT — PAIN DESCRIPTION - LOCATION: LOCATION: BACK

## 2019-12-19 ASSESSMENT — PAIN DESCRIPTION - ONSET: ONSET: ON-GOING

## 2019-12-19 ASSESSMENT — PAIN DESCRIPTION - DESCRIPTORS: DESCRIPTORS: PINS AND NEEDLES

## 2019-12-19 ASSESSMENT — PAIN DESCRIPTION - PAIN TYPE: TYPE: CHRONIC PAIN

## 2019-12-19 ASSESSMENT — PAIN DESCRIPTION - ORIENTATION: ORIENTATION: LOWER;UPPER;LEFT;RIGHT

## 2019-12-19 ASSESSMENT — PAIN DESCRIPTION - PROGRESSION: CLINICAL_PROGRESSION: NOT CHANGED

## 2019-12-19 NOTE — PROGRESS NOTES
Pt here for C.3D.8. Denies any new complaints. Labs drawn from port and results reviewed. Had Dr visit during treatment. Had 6.7 wt loss and ANC 1.5. Informed Dr Murriel Klinefelter and will continue with treatment.  added to treatment plan and was sent to lab at Dr. Murriel Klinefelter request. Pt was treated without incident and d/c'd in stable condition. Pt will return 1/2 for Dr visit and treatment.

## 2019-12-23 ENCOUNTER — TELEPHONE (OUTPATIENT)
Dept: ONCOLOGY | Age: 59
End: 2019-12-23

## 2019-12-30 ENCOUNTER — HOSPITAL ENCOUNTER (OUTPATIENT)
Dept: CT IMAGING | Age: 59
Discharge: HOME | End: 2020-01-01
Payer: COMMERCIAL

## 2019-12-30 ENCOUNTER — HOSPITAL ENCOUNTER (OUTPATIENT)
Dept: INFUSION THERAPY | Age: 59
Discharge: HOME | End: 2019-12-30
Payer: COMMERCIAL

## 2019-12-30 DIAGNOSIS — C25.9 PANCREATIC ADENOCARCINOMA (HCC): Primary | ICD-10-CM

## 2019-12-30 PROCEDURE — 96523 IRRIG DRUG DELIVERY DEVICE: CPT

## 2019-12-30 PROCEDURE — 2580000003 HC RX 258: Performed by: INTERNAL MEDICINE

## 2019-12-30 PROCEDURE — 74177 CT ABD & PELVIS W/CONTRAST: CPT

## 2019-12-30 PROCEDURE — 6360000004 HC RX CONTRAST MEDICATION: Performed by: INTERNAL MEDICINE

## 2019-12-30 PROCEDURE — 6360000002 HC RX W HCPCS: Performed by: INTERNAL MEDICINE

## 2019-12-30 RX ORDER — SODIUM CHLORIDE 0.9 % (FLUSH) 0.9 %
10 SYRINGE (ML) INJECTION PRN
Status: DISCONTINUED | OUTPATIENT
Start: 2019-12-30 | End: 2020-01-02 | Stop reason: HOSPADM

## 2019-12-30 RX ORDER — SODIUM CHLORIDE 0.9 % (FLUSH) 0.9 %
20 SYRINGE (ML) INJECTION PRN
Status: CANCELLED | OUTPATIENT
Start: 2019-12-30

## 2019-12-30 RX ORDER — SODIUM CHLORIDE 0.9 % (FLUSH) 0.9 %
10 SYRINGE (ML) INJECTION PRN
Status: CANCELLED | OUTPATIENT
Start: 2019-12-30

## 2019-12-30 RX ORDER — HEPARIN SODIUM (PORCINE) LOCK FLUSH IV SOLN 100 UNIT/ML 100 UNIT/ML
500 SOLUTION INTRAVENOUS PRN
Status: DISCONTINUED | OUTPATIENT
Start: 2019-12-30 | End: 2019-12-31 | Stop reason: HOSPADM

## 2019-12-30 RX ORDER — SODIUM CHLORIDE 0.9 % (FLUSH) 0.9 %
10 SYRINGE (ML) INJECTION PRN
Status: DISCONTINUED | OUTPATIENT
Start: 2019-12-30 | End: 2019-12-31 | Stop reason: HOSPADM

## 2019-12-30 RX ORDER — 0.9 % SODIUM CHLORIDE 0.9 %
80 INTRAVENOUS SOLUTION INTRAVENOUS ONCE
Status: COMPLETED | OUTPATIENT
Start: 2019-12-30 | End: 2019-12-30

## 2019-12-30 RX ORDER — HEPARIN SODIUM (PORCINE) LOCK FLUSH IV SOLN 100 UNIT/ML 100 UNIT/ML
500 SOLUTION INTRAVENOUS PRN
Status: CANCELLED | OUTPATIENT
Start: 2019-12-30

## 2019-12-30 RX ADMIN — Medication 10 ML: at 09:21

## 2019-12-30 RX ADMIN — SODIUM CHLORIDE 80 ML: 9 INJECTION, SOLUTION INTRAVENOUS at 10:48

## 2019-12-30 RX ADMIN — Medication 10 ML: at 10:51

## 2019-12-30 RX ADMIN — HEPARIN 500 UNITS: 100 SYRINGE at 11:00

## 2019-12-30 RX ADMIN — Medication 10 ML: at 11:00

## 2019-12-30 RX ADMIN — Medication 10 ML: at 09:20

## 2019-12-30 RX ADMIN — IOHEXOL 50 ML: 240 INJECTION, SOLUTION INTRATHECAL; INTRAVASCULAR; INTRAVENOUS; ORAL at 10:51

## 2019-12-30 RX ADMIN — IOVERSOL 100 ML: 741 INJECTION INTRA-ARTERIAL; INTRAVENOUS at 10:51

## 2020-01-02 ENCOUNTER — TELEPHONE (OUTPATIENT)
Dept: ONCOLOGY | Age: 60
End: 2020-01-02

## 2020-01-02 ENCOUNTER — OFFICE VISIT (OUTPATIENT)
Dept: ONCOLOGY | Age: 60
End: 2020-01-02
Payer: COMMERCIAL

## 2020-01-02 ENCOUNTER — TELEPHONE (OUTPATIENT)
Dept: PALLATIVE CARE | Age: 60
End: 2020-01-02

## 2020-01-02 ENCOUNTER — HOSPITAL ENCOUNTER (OUTPATIENT)
Dept: INFUSION THERAPY | Age: 60
Discharge: HOME | End: 2020-01-02
Payer: COMMERCIAL

## 2020-01-02 VITALS
WEIGHT: 104.6 LBS | TEMPERATURE: 98.5 F | HEART RATE: 98 BPM | DIASTOLIC BLOOD PRESSURE: 85 MMHG | SYSTOLIC BLOOD PRESSURE: 135 MMHG | BODY MASS INDEX: 16.38 KG/M2

## 2020-01-02 VITALS
SYSTOLIC BLOOD PRESSURE: 135 MMHG | DIASTOLIC BLOOD PRESSURE: 85 MMHG | TEMPERATURE: 98.5 F | HEART RATE: 98 BPM | RESPIRATION RATE: 16 BRPM

## 2020-01-02 DIAGNOSIS — C25.1 MALIGNANT NEOPLASM OF BODY OF PANCREAS (HCC): Primary | ICD-10-CM

## 2020-01-02 DIAGNOSIS — C25.9 PANCREATIC ADENOCARCINOMA (HCC): ICD-10-CM

## 2020-01-02 LAB
ABSOLUTE EOS #: 0.1 K/UL (ref 0–0.4)
ABSOLUTE IMMATURE GRANULOCYTE: ABNORMAL K/UL (ref 0–0.3)
ABSOLUTE LYMPH #: 0.7 K/UL (ref 1–4.8)
ABSOLUTE MONO #: 0.4 K/UL (ref 0.1–1.2)
ALBUMIN SERPL-MCNC: 3.4 G/DL (ref 3.5–5.2)
ALBUMIN/GLOBULIN RATIO: 1.1 (ref 1–2.5)
ALP BLD-CCNC: 73 U/L (ref 35–104)
ALT SERPL-CCNC: 14 U/L (ref 5–33)
ANION GAP SERPL CALCULATED.3IONS-SCNC: 10 MMOL/L (ref 9–17)
AST SERPL-CCNC: 19 U/L
BASOPHILS # BLD: 2 % (ref 0–2)
BASOPHILS ABSOLUTE: 0.1 K/UL (ref 0–0.2)
BILIRUB SERPL-MCNC: 0.37 MG/DL (ref 0.3–1.2)
BUN BLDV-MCNC: 9 MG/DL (ref 6–20)
BUN/CREAT BLD: ABNORMAL (ref 9–20)
CA 19-9: 4764 U/ML (ref 0–35)
CALCIUM SERPL-MCNC: 8.8 MG/DL (ref 8.6–10.4)
CHLORIDE BLD-SCNC: 103 MMOL/L (ref 98–107)
CO2: 25 MMOL/L (ref 20–31)
CREAT SERPL-MCNC: 0.41 MG/DL (ref 0.5–0.9)
DIFFERENTIAL TYPE: ABNORMAL
EOSINOPHILS RELATIVE PERCENT: 3 % (ref 1–4)
GFR AFRICAN AMERICAN: >60 ML/MIN
GFR NON-AFRICAN AMERICAN: >60 ML/MIN
GFR SERPL CREATININE-BSD FRML MDRD: ABNORMAL ML/MIN/{1.73_M2}
GFR SERPL CREATININE-BSD FRML MDRD: ABNORMAL ML/MIN/{1.73_M2}
GLUCOSE BLD-MCNC: 102 MG/DL (ref 70–99)
HCT VFR BLD CALC: 26.5 % (ref 36–46)
HEMOGLOBIN: 8.3 G/DL (ref 12–16)
IMMATURE GRANULOCYTES: ABNORMAL %
LYMPHOCYTES # BLD: 18 % (ref 24–44)
MCH RBC QN AUTO: 26.3 PG (ref 26–34)
MCHC RBC AUTO-ENTMCNC: 31.2 G/DL (ref 31–37)
MCV RBC AUTO: 84.2 FL (ref 80–100)
MONOCYTES # BLD: 9 % (ref 2–11)
NRBC AUTOMATED: ABNORMAL PER 100 WBC
PDW BLD-RTO: 18.7 % (ref 12.5–15.4)
PLATELET # BLD: 445 K/UL (ref 140–450)
PLATELET ESTIMATE: ABNORMAL
PMV BLD AUTO: 8.4 FL (ref 6–12)
POTASSIUM SERPL-SCNC: 4.1 MMOL/L (ref 3.7–5.3)
RBC # BLD: 3.14 M/UL (ref 4–5.2)
RBC # BLD: ABNORMAL 10*6/UL
SEG NEUTROPHILS: 68 % (ref 36–66)
SEGMENTED NEUTROPHILS ABSOLUTE COUNT: 2.7 K/UL (ref 1.8–7.7)
SODIUM BLD-SCNC: 138 MMOL/L (ref 135–144)
TOTAL PROTEIN: 6.4 G/DL (ref 6.4–8.3)
WBC # BLD: 4 K/UL (ref 3.5–11)
WBC # BLD: ABNORMAL 10*3/UL

## 2020-01-02 PROCEDURE — 99215 OFFICE O/P EST HI 40 MIN: CPT | Performed by: INTERNAL MEDICINE

## 2020-01-02 PROCEDURE — 6360000002 HC RX W HCPCS: Performed by: INTERNAL MEDICINE

## 2020-01-02 PROCEDURE — 36591 DRAW BLOOD OFF VENOUS DEVICE: CPT

## 2020-01-02 PROCEDURE — 96413 CHEMO IV INFUSION 1 HR: CPT

## 2020-01-02 PROCEDURE — 2580000003 HC RX 258: Performed by: INTERNAL MEDICINE

## 2020-01-02 PROCEDURE — 86301 IMMUNOASSAY TUMOR CA 19-9: CPT

## 2020-01-02 PROCEDURE — 96417 CHEMO IV INFUS EACH ADDL SEQ: CPT

## 2020-01-02 PROCEDURE — 96375 TX/PRO/DX INJ NEW DRUG ADDON: CPT

## 2020-01-02 PROCEDURE — 85025 COMPLETE CBC W/AUTO DIFF WBC: CPT

## 2020-01-02 PROCEDURE — 80053 COMPREHEN METABOLIC PANEL: CPT

## 2020-01-02 RX ORDER — EPINEPHRINE 1 MG/ML
0.3 INJECTION, SOLUTION, CONCENTRATE INTRAVENOUS PRN
Status: CANCELLED | OUTPATIENT
Start: 2020-01-16

## 2020-01-02 RX ORDER — METHYLPREDNISOLONE SODIUM SUCCINATE 125 MG/2ML
125 INJECTION, POWDER, LYOPHILIZED, FOR SOLUTION INTRAMUSCULAR; INTRAVENOUS ONCE
Status: CANCELLED | OUTPATIENT
Start: 2020-01-02

## 2020-01-02 RX ORDER — MEPERIDINE HYDROCHLORIDE 50 MG/ML
12.5 INJECTION INTRAMUSCULAR; INTRAVENOUS; SUBCUTANEOUS ONCE
Status: CANCELLED | OUTPATIENT
Start: 2020-01-02

## 2020-01-02 RX ORDER — PACLITAXEL 100 MG/20ML
180 INJECTION, POWDER, LYOPHILIZED, FOR SUSPENSION INTRAVENOUS ONCE
Status: COMPLETED | OUTPATIENT
Start: 2020-01-02 | End: 2020-01-02

## 2020-01-02 RX ORDER — SODIUM CHLORIDE 9 MG/ML
20 INJECTION, SOLUTION INTRAVENOUS ONCE
Status: CANCELLED | OUTPATIENT
Start: 2020-01-09

## 2020-01-02 RX ORDER — EPINEPHRINE 1 MG/ML
0.3 INJECTION, SOLUTION, CONCENTRATE INTRAVENOUS PRN
Status: CANCELLED | OUTPATIENT
Start: 2020-01-02

## 2020-01-02 RX ORDER — SODIUM CHLORIDE 9 MG/ML
20 INJECTION, SOLUTION INTRAVENOUS ONCE
Status: COMPLETED | OUTPATIENT
Start: 2020-01-02 | End: 2020-01-02

## 2020-01-02 RX ORDER — SODIUM CHLORIDE 0.9 % (FLUSH) 0.9 %
5 SYRINGE (ML) INJECTION PRN
Status: CANCELLED | OUTPATIENT
Start: 2020-01-16

## 2020-01-02 RX ORDER — PACLITAXEL 100 MG/20ML
180 INJECTION, POWDER, LYOPHILIZED, FOR SUSPENSION INTRAVENOUS ONCE
Status: CANCELLED | OUTPATIENT
Start: 2020-01-02

## 2020-01-02 RX ORDER — ONDANSETRON 2 MG/ML
4 INJECTION INTRAMUSCULAR; INTRAVENOUS EVERY 4 HOURS PRN
Status: DISCONTINUED | OUTPATIENT
Start: 2020-01-02 | End: 2020-01-02

## 2020-01-02 RX ORDER — DEXAMETHASONE SODIUM PHOSPHATE 10 MG/ML
10 INJECTION INTRAMUSCULAR; INTRAVENOUS ONCE
Status: COMPLETED | OUTPATIENT
Start: 2020-01-02 | End: 2020-01-02

## 2020-01-02 RX ORDER — SODIUM CHLORIDE 0.9 % (FLUSH) 0.9 %
10 SYRINGE (ML) INJECTION PRN
Status: CANCELLED | OUTPATIENT
Start: 2020-01-09

## 2020-01-02 RX ORDER — MEPERIDINE HYDROCHLORIDE 50 MG/ML
12.5 INJECTION INTRAMUSCULAR; INTRAVENOUS; SUBCUTANEOUS ONCE
Status: CANCELLED | OUTPATIENT
Start: 2020-01-16

## 2020-01-02 RX ORDER — SODIUM CHLORIDE 9 MG/ML
INJECTION, SOLUTION INTRAVENOUS CONTINUOUS
Status: CANCELLED | OUTPATIENT
Start: 2020-01-02

## 2020-01-02 RX ORDER — ONDANSETRON 2 MG/ML
4 INJECTION INTRAMUSCULAR; INTRAVENOUS EVERY 4 HOURS PRN
Status: CANCELLED | OUTPATIENT
Start: 2020-01-09

## 2020-01-02 RX ORDER — METHYLPREDNISOLONE SODIUM SUCCINATE 125 MG/2ML
125 INJECTION, POWDER, LYOPHILIZED, FOR SOLUTION INTRAMUSCULAR; INTRAVENOUS ONCE
Status: CANCELLED | OUTPATIENT
Start: 2020-01-16

## 2020-01-02 RX ORDER — HEPARIN SODIUM (PORCINE) LOCK FLUSH IV SOLN 100 UNIT/ML 100 UNIT/ML
500 SOLUTION INTRAVENOUS PRN
Status: CANCELLED | OUTPATIENT
Start: 2020-01-09

## 2020-01-02 RX ORDER — DIPHENHYDRAMINE HYDROCHLORIDE 50 MG/ML
50 INJECTION INTRAMUSCULAR; INTRAVENOUS ONCE
Status: CANCELLED | OUTPATIENT
Start: 2020-01-16

## 2020-01-02 RX ORDER — SODIUM CHLORIDE 9 MG/ML
INJECTION, SOLUTION INTRAVENOUS CONTINUOUS
Status: CANCELLED | OUTPATIENT
Start: 2020-01-09

## 2020-01-02 RX ORDER — DIPHENHYDRAMINE HYDROCHLORIDE 50 MG/ML
50 INJECTION INTRAMUSCULAR; INTRAVENOUS ONCE
Status: CANCELLED | OUTPATIENT
Start: 2020-01-09

## 2020-01-02 RX ORDER — PACLITAXEL 100 MG/20ML
180 INJECTION, POWDER, LYOPHILIZED, FOR SUSPENSION INTRAVENOUS ONCE
Status: CANCELLED | OUTPATIENT
Start: 2020-01-16

## 2020-01-02 RX ORDER — MEPERIDINE HYDROCHLORIDE 50 MG/ML
12.5 INJECTION INTRAMUSCULAR; INTRAVENOUS; SUBCUTANEOUS ONCE
Status: CANCELLED | OUTPATIENT
Start: 2020-01-09

## 2020-01-02 RX ORDER — METHYLPREDNISOLONE SODIUM SUCCINATE 125 MG/2ML
125 INJECTION, POWDER, LYOPHILIZED, FOR SOLUTION INTRAMUSCULAR; INTRAVENOUS ONCE
Status: CANCELLED | OUTPATIENT
Start: 2020-01-09

## 2020-01-02 RX ORDER — SODIUM CHLORIDE 9 MG/ML
20 INJECTION, SOLUTION INTRAVENOUS ONCE
Status: CANCELLED | OUTPATIENT
Start: 2020-01-16

## 2020-01-02 RX ORDER — SODIUM CHLORIDE 0.9 % (FLUSH) 0.9 %
10 SYRINGE (ML) INJECTION PRN
Status: DISCONTINUED | OUTPATIENT
Start: 2020-01-02 | End: 2020-01-03 | Stop reason: HOSPADM

## 2020-01-02 RX ORDER — HEPARIN SODIUM (PORCINE) LOCK FLUSH IV SOLN 100 UNIT/ML 100 UNIT/ML
500 SOLUTION INTRAVENOUS PRN
Status: DISCONTINUED | OUTPATIENT
Start: 2020-01-02 | End: 2020-01-03 | Stop reason: HOSPADM

## 2020-01-02 RX ORDER — SODIUM CHLORIDE 0.9 % (FLUSH) 0.9 %
10 SYRINGE (ML) INJECTION PRN
Status: CANCELLED | OUTPATIENT
Start: 2020-01-16

## 2020-01-02 RX ORDER — ONDANSETRON 2 MG/ML
4 INJECTION INTRAMUSCULAR; INTRAVENOUS EVERY 4 HOURS PRN
Status: CANCELLED | OUTPATIENT
Start: 2020-01-16

## 2020-01-02 RX ORDER — PACLITAXEL 100 MG/20ML
180 INJECTION, POWDER, LYOPHILIZED, FOR SUSPENSION INTRAVENOUS ONCE
Status: CANCELLED | OUTPATIENT
Start: 2020-01-09

## 2020-01-02 RX ORDER — HEPARIN SODIUM (PORCINE) LOCK FLUSH IV SOLN 100 UNIT/ML 100 UNIT/ML
500 SOLUTION INTRAVENOUS PRN
Status: CANCELLED | OUTPATIENT
Start: 2020-01-16

## 2020-01-02 RX ORDER — PROMETHAZINE HYDROCHLORIDE 25 MG/ML
12.5 INJECTION, SOLUTION INTRAMUSCULAR; INTRAVENOUS ONCE
Status: COMPLETED | OUTPATIENT
Start: 2020-01-02 | End: 2020-01-02

## 2020-01-02 RX ORDER — EPINEPHRINE 1 MG/ML
0.3 INJECTION, SOLUTION, CONCENTRATE INTRAVENOUS PRN
Status: CANCELLED | OUTPATIENT
Start: 2020-01-09

## 2020-01-02 RX ORDER — SODIUM CHLORIDE 0.9 % (FLUSH) 0.9 %
5 SYRINGE (ML) INJECTION PRN
Status: CANCELLED | OUTPATIENT
Start: 2020-01-02

## 2020-01-02 RX ORDER — SODIUM CHLORIDE 9 MG/ML
INJECTION, SOLUTION INTRAVENOUS CONTINUOUS
Status: CANCELLED | OUTPATIENT
Start: 2020-01-16

## 2020-01-02 RX ORDER — SODIUM CHLORIDE 0.9 % (FLUSH) 0.9 %
5 SYRINGE (ML) INJECTION PRN
Status: CANCELLED | OUTPATIENT
Start: 2020-01-09

## 2020-01-02 RX ORDER — DIPHENHYDRAMINE HYDROCHLORIDE 50 MG/ML
50 INJECTION INTRAMUSCULAR; INTRAVENOUS ONCE
Status: CANCELLED | OUTPATIENT
Start: 2020-01-02

## 2020-01-02 RX ADMIN — Medication 10 ML: at 13:40

## 2020-01-02 RX ADMIN — SODIUM CHLORIDE 20 ML/HR: 9 INJECTION, SOLUTION INTRAVENOUS at 14:46

## 2020-01-02 RX ADMIN — Medication 10 ML: at 16:49

## 2020-01-02 RX ADMIN — ONDANSETRON 4 MG: 2 INJECTION INTRAMUSCULAR; INTRAVENOUS at 14:51

## 2020-01-02 RX ADMIN — HEPARIN 500 UNITS: 100 SYRINGE at 16:49

## 2020-01-02 RX ADMIN — GEMCITABINE 1400 MG: 38 INJECTION, SOLUTION INTRAVENOUS at 15:39

## 2020-01-02 RX ADMIN — PROMETHAZINE HYDROCHLORIDE 12.5 MG: 25 INJECTION INTRAMUSCULAR; INTRAVENOUS at 15:06

## 2020-01-02 RX ADMIN — DEXAMETHASONE SODIUM PHOSPHATE 10 MG: 10 INJECTION INTRAMUSCULAR; INTRAVENOUS at 14:52

## 2020-01-02 RX ADMIN — PACLITAXEL 180 MG: 100 INJECTION, POWDER, LYOPHILIZED, FOR SUSPENSION INTRAVENOUS at 16:12

## 2020-01-02 ASSESSMENT — PAIN DESCRIPTION - PAIN TYPE: TYPE: CHRONIC PAIN

## 2020-01-02 ASSESSMENT — PAIN DESCRIPTION - LOCATION: LOCATION: BACK

## 2020-01-02 ASSESSMENT — PAIN SCALES - GENERAL: PAINLEVEL_OUTOF10: 3

## 2020-01-02 NOTE — TELEPHONE ENCOUNTER
.. PALLIATIVE CARE NURSING ASSESSMENT    Patient: Navneet Barber  Room: Room/bed info not found    Reason For Consult   Goals of care evaluation  Distress management  Guidance and support  Facilitate communications  Assistance in coordinating care      Impression: Navneet Barber is a 64y.o. year old female  has a past medical history of Asthma, Cancer ([address] Congenital heart disease, Gastritis without bleeding, History of abnormal cervical Pap smear, Hyperlipidemia, Pressure ulcer of coccygeal region, stage 2 ([address] and Seasonal allergies. The Palliative Care Team is following to assist with cancer support. Patient Active Problem List   Diagnosis    Gastritis without bleeding    Pancreatic adenocarcinoma (HCC)    Anxiety    CINV (chemotherapy-induced nausea and vomiting)    Epigastric pain    Weakness    Abnormal loss of weight    Partial paralysis of both lower limbs (HCC)    Moderate protein-calorie malnutrition (HCC)    Spinal cord infarction (HCC)    Weakness of both lower extremities    Pressure ulcer of coccygeal region, stage 2 (HCC)    Nausea and vomiting    Moderate malnutrition ([address]    Chemotherapy induced nausea and vomiting    Leukopenia       Palliative Interaction: Pt here for chemo infusion. She is accompanied by her  and is alert and oriented. SYMPTOM MGT: Pt has a bright affect today. She states she is feeling good and had a good holiday with some time off chemotherapy. Pt states she has gained 10 pounds since being off chemo. Pt is here today for chemo infusion. GOALS OF CARE/SUPPORT: Pt states she had a CT scan and will find out results today when talking with doctor. She is hopeful that the scan will show some improvement as her tumor markers were lower on a previous visit. Pt plans to continue treatment and mentions the thought of going to [facility] to see a specialist. Pt not interested in stopping treatment at this time, she wishes to continue full treatment. Emotional support given to her and her . Pt talks about long-term but is unable due to [employer]". Referral made to  re: Vinod Cline and disability options. Will continue to follow for support.          Palliative Care Coordinator  [facility] MICHAEL Romo, PAULO BAUMANN [facility] Office: [address] Isidoro Perez Office: [phone]  Work Cell Phone: [phone]    For Symptom Management Clinic scheduling please call [phone]

## 2020-01-02 NOTE — PATIENT INSTRUCTIONS
Proceed with cycle 4 of therapy  Return visit in 2 weeks with day 15 of cycle 4 of therapy. Labs on treatment day.

## 2020-01-02 NOTE — TELEPHONE ENCOUNTER
Name: Angelita Light  : 1960  MRN: [id number]    Oncology Navigation Follow-Up Note    Contact Type:  Medical Oncology  Notes: Pt @ [facility] for Dr. Viri Aguiar f/u & tx. Accompanied Dr. Viri Aguiar to infusion area. Dr. Viri Aguiar spoke with pt & pt's spouse @ length r/t recent CT imaging, continuing chemotherapy, stopping tx, & referral back to [facility]. Pt stated \"I'm not ready to stop\". Dr. Viri Aguiar stated will proceed with tx, refer back to [facility], & will contact [facility] MD.  Support given to pt & pt's spouse. Instructed pt may contact writer prn. Will continue to follow.     Electronically signed by Tricia Greer RN on 2020 at 3:59 PM

## 2020-01-02 NOTE — PROGRESS NOTES
diagnostic; pr gi endoscopic ultrasound (N/A, 11/7/2018); TUNNELED CENTRAL VENOUS CATHETER W/ SUBCUTANEOUS PORT; and laparoscopy. CURRENT MEDICATIONS:  has a current medication list which includes the following prescription(s): promethazine, oxycodone, oxycodone hcl, lipase-protease-amylase, gabapentin, olanzapine, polyethylene glycol, naloxegol, ibuprofen, lorazepam, senna-docusate, prochlorperazine, aspirin, lidocaine-prilocaine, and ondansetron, and the following Facility-Administered Medications: heparin flush and sodium chloride flush. ALLERGIES:  has No Known Allergies. FAMILY HISTORY: Mother: breast cancer    SOCIAL HISTORY:  reports that she has never smoked. She has never used smokeless tobacco. She reports that she does not drink alcohol or use drugs. REVIEW OF SYSTEMS:   General: No fever or night sweats. +Weight regain  ENT: No double or blurred vision, no tinnitus or hearing problem, no dysphagia or sore throat   Respiratory: No chest pain, no shortness of breath, no cough or hemoptysis. Cardiovascular: Denies chest pain, PND or orthopnea or palpitations, or LE swelling  Gastrointestinal: Nausea and vomiting, ABD pain not fully controlled, no diarrhea, constipation  Genitourinary: Denies dysuria, hematuria, frequency, urgency or incontinence. Neurological: Denies headaches, decreased LOC, no sensory or motor focal deficits. Sensory neuropathy around mid-thoracic area - worse  Musculoskeletal: No arthralgia no back pain or joint swelling. Skin: There are no rashes or bleeding. Psychiatric:  No anxiety, no depression. Endocrine: No diabetes or thyroid disease. Hematologic: No bleeding, no adenopathy. PHYSICAL EXAM: Shows a well appearing 64y.o.-year-old female who is not in pain or distress.  Vital Signs: Blood pressure 135/85, pulse 98, temperature 98.5 °F (36.9 °C), temperature source Oral, weight 104 lb 9.6 oz (47.4 kg), last menstrual period 09/07/2012, not currently

## 2020-01-02 NOTE — PROGRESS NOTES
Patient here for C4D1 Gemzar/Abraxane. Labs drawn from port and reviewed. She saw Dr. Michelet Thomas today see his dictation. She tolerated treatment well and was discharged home with spouse in stable condition. She is due to return 1/9 for C4D8 Gemzar Abraxane.

## 2020-01-06 ENCOUNTER — TELEPHONE (OUTPATIENT)
Dept: ONCOLOGY | Age: 60
End: 2020-01-06

## 2020-01-07 ENCOUNTER — TELEPHONE (OUTPATIENT)
Dept: ONCOLOGY | Age: 60
End: 2020-01-07

## 2020-01-08 ENCOUNTER — TELEPHONE (OUTPATIENT)
Dept: ONCOLOGY | Age: 60
End: 2020-01-08

## 2020-01-08 ENCOUNTER — HOSPITAL ENCOUNTER (OUTPATIENT)
Age: 60
Discharge: HOME | End: 2020-01-08
Payer: COMMERCIAL

## 2020-01-08 ENCOUNTER — TELEPHONE (OUTPATIENT)
Dept: INFUSION THERAPY | Age: 60
End: 2020-01-08

## 2020-01-08 PROCEDURE — 86850 RBC ANTIBODY SCREEN: CPT

## 2020-01-08 PROCEDURE — 86901 BLOOD TYPING SEROLOGIC RH(D): CPT

## 2020-01-08 PROCEDURE — 36415 COLL VENOUS BLD VENIPUNCTURE: CPT

## 2020-01-08 PROCEDURE — P9016 RBC LEUKOCYTES REDUCED: HCPCS

## 2020-01-08 PROCEDURE — 86920 COMPATIBILITY TEST SPIN: CPT

## 2020-01-08 PROCEDURE — 86900 BLOOD TYPING SEROLOGIC ABO: CPT

## 2020-01-09 ENCOUNTER — HOSPITAL ENCOUNTER (OUTPATIENT)
Dept: INFUSION THERAPY | Age: 60
Discharge: HOME | End: 2020-01-09
Payer: COMMERCIAL

## 2020-01-09 ENCOUNTER — TELEPHONE (OUTPATIENT)
Dept: ONCOLOGY | Age: 60
End: 2020-01-09

## 2020-01-09 ENCOUNTER — TELEPHONE (OUTPATIENT)
Dept: PALLATIVE CARE | Age: 60
End: 2020-01-09

## 2020-01-09 VITALS
RESPIRATION RATE: 16 BRPM | HEART RATE: 92 BPM | TEMPERATURE: 98.2 F | DIASTOLIC BLOOD PRESSURE: 67 MMHG | SYSTOLIC BLOOD PRESSURE: 110 MMHG

## 2020-01-09 DIAGNOSIS — C25.9 PANCREATIC ADENOCARCINOMA (HCC): Primary | ICD-10-CM

## 2020-01-09 PROCEDURE — P9038 RBC IRRADIATED: HCPCS

## 2020-01-09 PROCEDURE — 2580000003 HC RX 258: Performed by: INTERNAL MEDICINE

## 2020-01-09 PROCEDURE — 36430 TRANSFUSION BLD/BLD COMPNT: CPT

## 2020-01-09 PROCEDURE — 6360000002 HC RX W HCPCS: Performed by: INTERNAL MEDICINE

## 2020-01-09 PROCEDURE — P9016 RBC LEUKOCYTES REDUCED: HCPCS

## 2020-01-09 RX ORDER — SODIUM CHLORIDE 0.9 % (FLUSH) 0.9 %
20 SYRINGE (ML) INJECTION PRN
Status: CANCELLED | OUTPATIENT
Start: 2020-01-09

## 2020-01-09 RX ORDER — SODIUM CHLORIDE 0.9 % (FLUSH) 0.9 %
10 SYRINGE (ML) INJECTION PRN
Status: DISCONTINUED | OUTPATIENT
Start: 2020-01-09 | End: 2020-01-10 | Stop reason: HOSPADM

## 2020-01-09 RX ORDER — SODIUM CHLORIDE 0.9 % (FLUSH) 0.9 %
10 SYRINGE (ML) INJECTION PRN
Status: CANCELLED | OUTPATIENT
Start: 2020-01-09

## 2020-01-09 RX ORDER — HEPARIN SODIUM (PORCINE) LOCK FLUSH IV SOLN 100 UNIT/ML 100 UNIT/ML
500 SOLUTION INTRAVENOUS PRN
Status: DISCONTINUED | OUTPATIENT
Start: 2020-01-09 | End: 2020-01-10 | Stop reason: HOSPADM

## 2020-01-09 RX ORDER — 0.9 % SODIUM CHLORIDE 0.9 %
250 INTRAVENOUS SOLUTION INTRAVENOUS ONCE
Status: COMPLETED | OUTPATIENT
Start: 2020-01-09 | End: 2020-01-09

## 2020-01-09 RX ORDER — HEPARIN SODIUM (PORCINE) LOCK FLUSH IV SOLN 100 UNIT/ML 100 UNIT/ML
500 SOLUTION INTRAVENOUS PRN
Status: CANCELLED | OUTPATIENT
Start: 2020-01-09

## 2020-01-09 RX ADMIN — SODIUM CHLORIDE 250 ML: 9 INJECTION, SOLUTION INTRAVENOUS at 11:15

## 2020-01-09 RX ADMIN — Medication 10 ML: at 13:34

## 2020-01-09 RX ADMIN — HEPARIN 500 UNITS: 100 SYRINGE at 13:34

## 2020-01-09 ASSESSMENT — PAIN DESCRIPTION - ORIENTATION: ORIENTATION: RIGHT;LEFT

## 2020-01-09 ASSESSMENT — PAIN DESCRIPTION - LOCATION: LOCATION: BACK

## 2020-01-09 ASSESSMENT — PAIN SCALES - GENERAL: PAINLEVEL_OUTOF10: 6

## 2020-01-09 ASSESSMENT — PAIN DESCRIPTION - PAIN TYPE: TYPE: CHRONIC PAIN

## 2020-01-09 NOTE — TELEPHONE ENCOUNTER
Name: Deysi Perkins  : 1960  MRN: [id number]    Oncology Navigation Follow-Up Note    Contact Type:  Medical Oncology  Notes: Pt @ [facility] for blood transfusion. Met with pt & pt's spouse in infusion area. Pt denied questions/concerns/issues. Instructed pt writer will f/u with Dr. Nadeen Self to inquire on tx plan & pt to keep 2020 f/u. Pt verbalized understanding & thanked writer. Will continue to follow.     Electronically signed by Riana Jiang RN on 2020 at 11:32 AM

## 2020-01-09 NOTE — TELEPHONE ENCOUNTER
.. PALLIATIVE CARE NURSING ASSESSMENT    Patient: Navneet Barber  Room: Room/bed info not found    Reason For Consult   Goals of care evaluation  Distress management  Guidance and support  Facilitate communications  Assistance in coordinating care      Impression: Navneet Barber is a 64y.o. year old female  has a past medical history of Asthma, Cancer ([address] Congenital heart disease, Gastritis without bleeding, History of abnormal cervical Pap smear, Hyperlipidemia, Pressure ulcer of coccygeal region, stage 2 ([address] and Seasonal allergies. The Palliative Care Team is following to assist with cancer support. Vital Signs  @V@    Patient Active Problem List   Diagnosis    Gastritis without bleeding    Pancreatic adenocarcinoma (HCC)    Anxiety    CINV (chemotherapy-induced nausea and vomiting)    Epigastric pain    Weakness    Abnormal loss of weight    Partial paralysis of both lower limbs (HCC)    Moderate protein-calorie malnutrition ([address]    Spinal cord infarction (HCC)    Weakness of both lower extremities    Pressure ulcer of coccygeal region, stage 2 (HCC)    Nausea and vomiting    Moderate malnutrition ([address]    Chemotherapy induced nausea and vomiting    Leukopenia       Palliative Interaction: Pt here for blood transfusion, accompanied by her  Henry Santana. GOALS OF CARE: Pt here for [facility] transfusion. Pt feeling \"terrible\" and very weak. She sleeps most of the visit. I talked with patient's  Henry Santana and he fills me in on what has been happening with patient. Henry Santana states patient's scans \"look worse\" and they were feeling very defeated last week. They then went to [facility] for a second opinion and had \"better news\". Henry Santana states the doctor from [facility] states the overall condition of the patient is not as bad as it looks on the scan. Pt and  felt \"better\" about the situation after talking with the [facility] doctor. Pt will be continuing treatment.   states, \"Last week we were talking about

## 2020-01-10 ENCOUNTER — TELEPHONE (OUTPATIENT)
Dept: ONCOLOGY | Age: 60
End: 2020-01-10

## 2020-01-10 LAB
ABO/RH: NORMAL
ANTIBODY SCREEN: NEGATIVE
ARM BAND NUMBER: NORMAL
BLD PROD TYP BPU: NORMAL
CROSSMATCH RESULT: NORMAL
DISPENSE STATUS BLOOD BANK: NORMAL
EXPIRATION DATE: NORMAL
TRANSFUSION STATUS: NORMAL
UNIT DIVISION: 0
UNIT NUMBER: NORMAL

## 2020-01-10 NOTE — TELEPHONE ENCOUNTER
Name: Kady Tatum  : 1960  MRN: [id number]    Oncology Navigation Follow-Up Note    Contact Type:  Telephone  Notes: Spoke with Dr. Emily Scott @ [facility] to inquire if spoke with Dr. Lilian Freeman pt's next f/u. Dr. Emily Scott stated has not spoke with Dr. Kasia Alaniz & requested pt keep scheduled 2020 appts. Spoke with pt via telephone, updated on conversation with Dr. Emily Scott. Will continue to follow.     Electronically signed by Reed Padilla RN on 1/10/2020 at 10:39 AM

## 2020-01-15 RX ORDER — SODIUM CHLORIDE 9 MG/ML
INJECTION, SOLUTION INTRAVENOUS CONTINUOUS
Status: CANCELLED | OUTPATIENT
Start: 2020-02-04

## 2020-01-15 RX ORDER — HEPARIN SODIUM (PORCINE) LOCK FLUSH IV SOLN 100 UNIT/ML 100 UNIT/ML
500 SOLUTION INTRAVENOUS PRN
Status: CANCELLED | OUTPATIENT
Start: 2020-01-21

## 2020-01-15 RX ORDER — DIPHENHYDRAMINE HYDROCHLORIDE 50 MG/ML
50 INJECTION INTRAMUSCULAR; INTRAVENOUS ONCE
Status: CANCELLED | OUTPATIENT
Start: 2020-01-21

## 2020-01-15 RX ORDER — SODIUM CHLORIDE 9 MG/ML
20 INJECTION, SOLUTION INTRAVENOUS ONCE
Status: CANCELLED | OUTPATIENT
Start: 2020-02-04

## 2020-01-15 RX ORDER — SODIUM CHLORIDE 0.9 % (FLUSH) 0.9 %
5 SYRINGE (ML) INJECTION PRN
Status: CANCELLED | OUTPATIENT
Start: 2020-01-21

## 2020-01-15 RX ORDER — SODIUM CHLORIDE 9 MG/ML
INJECTION, SOLUTION INTRAVENOUS CONTINUOUS
Status: CANCELLED | OUTPATIENT
Start: 2020-01-21

## 2020-01-15 RX ORDER — SODIUM CHLORIDE 0.9 % (FLUSH) 0.9 %
10 SYRINGE (ML) INJECTION PRN
Status: CANCELLED | OUTPATIENT
Start: 2020-02-04

## 2020-01-15 RX ORDER — SODIUM CHLORIDE 0.9 % (FLUSH) 0.9 %
5 SYRINGE (ML) INJECTION PRN
Status: CANCELLED | OUTPATIENT
Start: 2020-02-04

## 2020-01-15 RX ORDER — SODIUM CHLORIDE 9 MG/ML
20 INJECTION, SOLUTION INTRAVENOUS ONCE
Status: CANCELLED | OUTPATIENT
Start: 2020-01-21

## 2020-01-15 RX ORDER — SODIUM CHLORIDE 0.9 % (FLUSH) 0.9 %
10 SYRINGE (ML) INJECTION PRN
Status: CANCELLED | OUTPATIENT
Start: 2020-01-21

## 2020-01-15 RX ORDER — METHYLPREDNISOLONE SODIUM SUCCINATE 125 MG/2ML
125 INJECTION, POWDER, LYOPHILIZED, FOR SOLUTION INTRAMUSCULAR; INTRAVENOUS ONCE
Status: CANCELLED | OUTPATIENT
Start: 2020-01-21

## 2020-01-15 RX ORDER — PALONOSETRON 0.05 MG/ML
0.25 INJECTION, SOLUTION INTRAVENOUS ONCE
Status: CANCELLED | OUTPATIENT
Start: 2020-02-04

## 2020-01-15 RX ORDER — EPINEPHRINE 1 MG/ML
0.3 INJECTION, SOLUTION, CONCENTRATE INTRAVENOUS PRN
Status: CANCELLED | OUTPATIENT
Start: 2020-01-21

## 2020-01-15 RX ORDER — HEPARIN SODIUM (PORCINE) LOCK FLUSH IV SOLN 100 UNIT/ML 100 UNIT/ML
500 SOLUTION INTRAVENOUS PRN
Status: CANCELLED | OUTPATIENT
Start: 2020-02-04

## 2020-01-15 RX ORDER — EPINEPHRINE 1 MG/ML
0.3 INJECTION, SOLUTION, CONCENTRATE INTRAVENOUS PRN
Status: CANCELLED | OUTPATIENT
Start: 2020-02-04

## 2020-01-15 RX ORDER — METHYLPREDNISOLONE SODIUM SUCCINATE 125 MG/2ML
125 INJECTION, POWDER, LYOPHILIZED, FOR SOLUTION INTRAMUSCULAR; INTRAVENOUS ONCE
Status: CANCELLED | OUTPATIENT
Start: 2020-02-04

## 2020-01-15 RX ORDER — DIPHENHYDRAMINE HYDROCHLORIDE 50 MG/ML
50 INJECTION INTRAMUSCULAR; INTRAVENOUS ONCE
Status: CANCELLED | OUTPATIENT
Start: 2020-02-04

## 2020-01-15 RX ORDER — PALONOSETRON 0.05 MG/ML
0.25 INJECTION, SOLUTION INTRAVENOUS ONCE
Status: CANCELLED | OUTPATIENT
Start: 2020-01-21

## 2020-01-16 ENCOUNTER — TELEPHONE (OUTPATIENT)
Dept: ONCOLOGY | Age: 60
End: 2020-01-16

## 2020-01-16 ENCOUNTER — TELEPHONE (OUTPATIENT)
Dept: INFUSION THERAPY | Age: 60
End: 2020-01-16

## 2020-01-16 NOTE — TELEPHONE ENCOUNTER
Chemo is back from precert. I called pt to schedule pt scheduled 1/21/20 @9am for tx and MD will see pt while in tx.

## 2020-01-17 ENCOUNTER — TELEPHONE (OUTPATIENT)
Dept: INFUSION THERAPY | Age: 60
End: 2020-01-17

## 2020-01-21 ENCOUNTER — TELEPHONE (OUTPATIENT)
Dept: ONCOLOGY | Age: 60
End: 2020-01-21

## 2020-01-21 ENCOUNTER — HOSPITAL ENCOUNTER (OUTPATIENT)
Dept: INFUSION THERAPY | Age: 60
Discharge: HOME | End: 2020-01-21
Payer: COMMERCIAL

## 2020-01-21 ENCOUNTER — OFFICE VISIT (OUTPATIENT)
Dept: ONCOLOGY | Age: 60
End: 2020-01-21
Payer: COMMERCIAL

## 2020-01-21 VITALS
TEMPERATURE: 98 F | RESPIRATION RATE: 16 BRPM | DIASTOLIC BLOOD PRESSURE: 88 MMHG | BODY MASS INDEX: 16.05 KG/M2 | HEIGHT: 64 IN | SYSTOLIC BLOOD PRESSURE: 139 MMHG | HEART RATE: 108 BPM | WEIGHT: 94 LBS

## 2020-01-21 VITALS
WEIGHT: 94.8 LBS | TEMPERATURE: 98 F | HEIGHT: 64 IN | DIASTOLIC BLOOD PRESSURE: 88 MMHG | BODY MASS INDEX: 16.18 KG/M2 | SYSTOLIC BLOOD PRESSURE: 139 MMHG | RESPIRATION RATE: 16 BRPM | HEART RATE: 108 BPM

## 2020-01-21 DIAGNOSIS — C25.9 PANCREATIC ADENOCARCINOMA (HCC): ICD-10-CM

## 2020-01-21 DIAGNOSIS — C25.1 MALIGNANT NEOPLASM OF BODY OF PANCREAS (HCC): Primary | ICD-10-CM

## 2020-01-21 LAB
ABSOLUTE EOS #: 0.1 K/UL (ref 0–0.4)
ABSOLUTE IMMATURE GRANULOCYTE: ABNORMAL K/UL (ref 0–0.3)
ABSOLUTE LYMPH #: 0.6 K/UL (ref 1–4.8)
ABSOLUTE MONO #: 0.5 K/UL (ref 0.1–1.2)
ALBUMIN SERPL-MCNC: 3.9 G/DL (ref 3.5–5.2)
ALBUMIN/GLOBULIN RATIO: 1.2 (ref 1–2.5)
ALP BLD-CCNC: 89 U/L (ref 35–104)
ALT SERPL-CCNC: 17 U/L (ref 5–33)
ANION GAP SERPL CALCULATED.3IONS-SCNC: 14 MMOL/L (ref 9–17)
AST SERPL-CCNC: 18 U/L
BASOPHILS # BLD: 2 % (ref 0–2)
BASOPHILS ABSOLUTE: 0.1 K/UL (ref 0–0.2)
BILIRUB SERPL-MCNC: 0.51 MG/DL (ref 0.3–1.2)
BUN BLDV-MCNC: 14 MG/DL (ref 6–20)
BUN/CREAT BLD: ABNORMAL (ref 9–20)
CA 19-9: ABNORMAL U/ML (ref 0–35)
CALCIUM SERPL-MCNC: 9.8 MG/DL (ref 8.6–10.4)
CHLORIDE BLD-SCNC: 99 MMOL/L (ref 98–107)
CO2: 26 MMOL/L (ref 20–31)
CREAT SERPL-MCNC: 0.52 MG/DL (ref 0.5–0.9)
DIFFERENTIAL TYPE: ABNORMAL
EOSINOPHILS RELATIVE PERCENT: 3 % (ref 1–4)
GFR AFRICAN AMERICAN: >60 ML/MIN
GFR NON-AFRICAN AMERICAN: >60 ML/MIN
GFR SERPL CREATININE-BSD FRML MDRD: ABNORMAL ML/MIN/{1.73_M2}
GFR SERPL CREATININE-BSD FRML MDRD: ABNORMAL ML/MIN/{1.73_M2}
GLUCOSE BLD-MCNC: 132 MG/DL (ref 70–99)
HCT VFR BLD CALC: 29.6 % (ref 36–46)
HEMOGLOBIN: 9.6 G/DL (ref 12–16)
IMMATURE GRANULOCYTES: ABNORMAL %
LYMPHOCYTES # BLD: 15 % (ref 24–44)
MAGNESIUM: 2.1 MG/DL (ref 1.6–2.6)
MCH RBC QN AUTO: 27.5 PG (ref 26–34)
MCHC RBC AUTO-ENTMCNC: 32.4 G/DL (ref 31–37)
MCV RBC AUTO: 84.9 FL (ref 80–100)
MONOCYTES # BLD: 13 % (ref 2–11)
NRBC AUTOMATED: ABNORMAL PER 100 WBC
PDW BLD-RTO: 19 % (ref 12.5–15.4)
PLATELET # BLD: 262 K/UL (ref 140–450)
PLATELET ESTIMATE: ABNORMAL
PMV BLD AUTO: 8.7 FL (ref 6–12)
POTASSIUM SERPL-SCNC: 4.1 MMOL/L (ref 3.7–5.3)
RBC # BLD: 3.49 M/UL (ref 4–5.2)
RBC # BLD: ABNORMAL 10*6/UL
SEG NEUTROPHILS: 67 % (ref 36–66)
SEGMENTED NEUTROPHILS ABSOLUTE COUNT: 2.7 K/UL (ref 1.8–7.7)
SODIUM BLD-SCNC: 139 MMOL/L (ref 135–144)
TOTAL PROTEIN: 7.1 G/DL (ref 6.4–8.3)
WBC # BLD: 4.1 K/UL (ref 3.5–11)
WBC # BLD: ABNORMAL 10*3/UL

## 2020-01-21 PROCEDURE — 83735 ASSAY OF MAGNESIUM: CPT

## 2020-01-21 PROCEDURE — 96413 CHEMO IV INFUSION 1 HR: CPT

## 2020-01-21 PROCEDURE — 80053 COMPREHEN METABOLIC PANEL: CPT

## 2020-01-21 PROCEDURE — 36591 DRAW BLOOD OFF VENOUS DEVICE: CPT

## 2020-01-21 PROCEDURE — 96361 HYDRATE IV INFUSION ADD-ON: CPT

## 2020-01-21 PROCEDURE — 86301 IMMUNOASSAY TUMOR CA 19-9: CPT

## 2020-01-21 PROCEDURE — 96417 CHEMO IV INFUS EACH ADDL SEQ: CPT

## 2020-01-21 PROCEDURE — 85025 COMPLETE CBC W/AUTO DIFF WBC: CPT

## 2020-01-21 PROCEDURE — 99215 OFFICE O/P EST HI 40 MIN: CPT | Performed by: INTERNAL MEDICINE

## 2020-01-21 PROCEDURE — 6360000002 HC RX W HCPCS: Performed by: INTERNAL MEDICINE

## 2020-01-21 PROCEDURE — 2580000003 HC RX 258: Performed by: INTERNAL MEDICINE

## 2020-01-21 PROCEDURE — 96375 TX/PRO/DX INJ NEW DRUG ADDON: CPT

## 2020-01-21 PROCEDURE — 96367 TX/PROPH/DG ADDL SEQ IV INF: CPT

## 2020-01-21 RX ORDER — SODIUM CHLORIDE 0.9 % (FLUSH) 0.9 %
5 SYRINGE (ML) INJECTION PRN
Status: CANCELLED | OUTPATIENT
Start: 2020-03-03

## 2020-01-21 RX ORDER — OXYCODONE HYDROCHLORIDE 10 MG/1
10 TABLET ORAL EVERY 6 HOURS PRN
Qty: 120 TABLET | Refills: 0 | Status: SHIPPED | OUTPATIENT
Start: 2020-01-21 | End: 2020-02-20

## 2020-01-21 RX ORDER — DEXAMETHASONE SODIUM PHOSPHATE 10 MG/ML
10 INJECTION, SOLUTION INTRAMUSCULAR; INTRAVENOUS ONCE
Status: CANCELLED
Start: 2020-02-04

## 2020-01-21 RX ORDER — GABAPENTIN 300 MG/1
300 CAPSULE ORAL 3 TIMES DAILY
Qty: 90 CAPSULE | Refills: 3 | Status: SHIPPED | OUTPATIENT
Start: 2020-01-21 | End: 2020-02-20

## 2020-01-21 RX ORDER — PALONOSETRON 0.05 MG/ML
0.25 INJECTION, SOLUTION INTRAVENOUS ONCE
Status: CANCELLED | OUTPATIENT
Start: 2020-02-18

## 2020-01-21 RX ORDER — EPINEPHRINE 1 MG/ML
0.3 INJECTION, SOLUTION, CONCENTRATE INTRAVENOUS PRN
Status: CANCELLED | OUTPATIENT
Start: 2020-03-03

## 2020-01-21 RX ORDER — EPINEPHRINE 1 MG/ML
0.3 INJECTION, SOLUTION, CONCENTRATE INTRAVENOUS PRN
Status: CANCELLED | OUTPATIENT
Start: 2020-02-18

## 2020-01-21 RX ORDER — DIPHENHYDRAMINE HYDROCHLORIDE 50 MG/ML
50 INJECTION INTRAMUSCULAR; INTRAVENOUS ONCE
Status: CANCELLED | OUTPATIENT
Start: 2020-02-18

## 2020-01-21 RX ORDER — HEPARIN SODIUM (PORCINE) LOCK FLUSH IV SOLN 100 UNIT/ML 100 UNIT/ML
500 SOLUTION INTRAVENOUS PRN
Status: DISCONTINUED | OUTPATIENT
Start: 2020-01-21 | End: 2020-01-22 | Stop reason: HOSPADM

## 2020-01-21 RX ORDER — SODIUM CHLORIDE 0.9 % (FLUSH) 0.9 %
5 SYRINGE (ML) INJECTION PRN
Status: CANCELLED | OUTPATIENT
Start: 2020-02-18

## 2020-01-21 RX ORDER — METHYLPREDNISOLONE SODIUM SUCCINATE 125 MG/2ML
125 INJECTION, POWDER, LYOPHILIZED, FOR SOLUTION INTRAMUSCULAR; INTRAVENOUS ONCE
Status: CANCELLED | OUTPATIENT
Start: 2020-03-03

## 2020-01-21 RX ORDER — SODIUM CHLORIDE 9 MG/ML
20 INJECTION, SOLUTION INTRAVENOUS ONCE
Status: CANCELLED | OUTPATIENT
Start: 2020-03-03

## 2020-01-21 RX ORDER — DIPHENHYDRAMINE HYDROCHLORIDE 50 MG/ML
50 INJECTION INTRAMUSCULAR; INTRAVENOUS ONCE
Status: CANCELLED | OUTPATIENT
Start: 2020-03-03

## 2020-01-21 RX ORDER — SODIUM CHLORIDE 9 MG/ML
INJECTION, SOLUTION INTRAVENOUS CONTINUOUS
Status: CANCELLED | OUTPATIENT
Start: 2020-03-03

## 2020-01-21 RX ORDER — PALONOSETRON 0.05 MG/ML
0.25 INJECTION, SOLUTION INTRAVENOUS ONCE
Status: CANCELLED | OUTPATIENT
Start: 2020-03-03

## 2020-01-21 RX ORDER — SODIUM CHLORIDE 0.9 % (FLUSH) 0.9 %
10 SYRINGE (ML) INJECTION PRN
Status: CANCELLED | OUTPATIENT
Start: 2020-02-18

## 2020-01-21 RX ORDER — HEPARIN SODIUM (PORCINE) LOCK FLUSH IV SOLN 100 UNIT/ML 100 UNIT/ML
500 SOLUTION INTRAVENOUS PRN
Status: CANCELLED | OUTPATIENT
Start: 2020-03-03

## 2020-01-21 RX ORDER — HEPARIN SODIUM (PORCINE) LOCK FLUSH IV SOLN 100 UNIT/ML 100 UNIT/ML
500 SOLUTION INTRAVENOUS PRN
Status: CANCELLED | OUTPATIENT
Start: 2020-02-18

## 2020-01-21 RX ORDER — SODIUM CHLORIDE 9 MG/ML
20 INJECTION, SOLUTION INTRAVENOUS ONCE
Status: COMPLETED | OUTPATIENT
Start: 2020-01-21 | End: 2020-01-21

## 2020-01-21 RX ORDER — DEXAMETHASONE SODIUM PHOSPHATE 10 MG/ML
10 INJECTION INTRAMUSCULAR; INTRAVENOUS ONCE
Status: COMPLETED | OUTPATIENT
Start: 2020-01-21 | End: 2020-01-21

## 2020-01-21 RX ORDER — SODIUM CHLORIDE 0.9 % (FLUSH) 0.9 %
10 SYRINGE (ML) INJECTION PRN
Status: CANCELLED | OUTPATIENT
Start: 2020-03-03

## 2020-01-21 RX ORDER — MORPHINE SULFATE 30 MG/1
30 TABLET, FILM COATED, EXTENDED RELEASE ORAL 2 TIMES DAILY
Qty: 60 TABLET | Refills: 0 | Status: SHIPPED | OUTPATIENT
Start: 2020-01-21 | End: 2020-02-20

## 2020-01-21 RX ORDER — PALONOSETRON 0.05 MG/ML
0.25 INJECTION, SOLUTION INTRAVENOUS ONCE
Status: COMPLETED | OUTPATIENT
Start: 2020-01-21 | End: 2020-01-21

## 2020-01-21 RX ORDER — SODIUM CHLORIDE 9 MG/ML
20 INJECTION, SOLUTION INTRAVENOUS ONCE
Status: CANCELLED | OUTPATIENT
Start: 2020-02-18

## 2020-01-21 RX ORDER — SODIUM CHLORIDE 0.9 % (FLUSH) 0.9 %
10 SYRINGE (ML) INJECTION PRN
Status: DISCONTINUED | OUTPATIENT
Start: 2020-01-21 | End: 2020-01-22 | Stop reason: HOSPADM

## 2020-01-21 RX ORDER — OLANZAPINE 5 MG/1
5 TABLET ORAL NIGHTLY
Qty: 90 TABLET | Refills: 1 | Status: SHIPPED | OUTPATIENT
Start: 2020-01-21 | End: 2020-01-22 | Stop reason: SDUPTHER

## 2020-01-21 RX ORDER — SODIUM CHLORIDE 9 MG/ML
INJECTION, SOLUTION INTRAVENOUS CONTINUOUS
Status: CANCELLED | OUTPATIENT
Start: 2020-02-18

## 2020-01-21 RX ORDER — METHYLPREDNISOLONE SODIUM SUCCINATE 125 MG/2ML
125 INJECTION, POWDER, LYOPHILIZED, FOR SOLUTION INTRAMUSCULAR; INTRAVENOUS ONCE
Status: CANCELLED | OUTPATIENT
Start: 2020-02-18

## 2020-01-21 RX ADMIN — PALONOSETRON HYDROCHLORIDE 0.25 MG: 0.25 INJECTION, SOLUTION INTRAVENOUS at 10:57

## 2020-01-21 RX ADMIN — POTASSIUM CHLORIDE: 2 INJECTION, SOLUTION, CONCENTRATE INTRAVENOUS at 09:32

## 2020-01-21 RX ADMIN — SODIUM CHLORIDE, PRESERVATIVE FREE 10 ML: 5 INJECTION INTRAVENOUS at 09:18

## 2020-01-21 RX ADMIN — CISPLATIN: 100 INJECTION, SOLUTION INTRAVENOUS at 12:28

## 2020-01-21 RX ADMIN — GEMCITABINE 1400 MG: 38 INJECTION, SOLUTION INTRAVENOUS at 11:49

## 2020-01-21 RX ADMIN — SODIUM CHLORIDE, PRESERVATIVE FREE 10 ML: 5 INJECTION INTRAVENOUS at 15:21

## 2020-01-21 RX ADMIN — DEXAMETHASONE SODIUM PHOSPHATE 10 MG: 10 INJECTION INTRAMUSCULAR; INTRAVENOUS at 10:57

## 2020-01-21 RX ADMIN — SODIUM CHLORIDE 20 ML/HR: 9 INJECTION, SOLUTION INTRAVENOUS at 09:32

## 2020-01-21 RX ADMIN — POTASSIUM CHLORIDE: 2 INJECTION, SOLUTION, CONCENTRATE INTRAVENOUS at 14:12

## 2020-01-21 RX ADMIN — FOSAPREPITANT 150 MG: 150 INJECTION, POWDER, LYOPHILIZED, FOR SOLUTION INTRAVENOUS at 11:05

## 2020-01-21 RX ADMIN — HEPARIN 500 UNITS: 100 SYRINGE at 15:21

## 2020-01-21 ASSESSMENT — PAIN DESCRIPTION - LOCATION: LOCATION: BACK

## 2020-01-21 ASSESSMENT — PAIN SCALES - GENERAL: PAINLEVEL_OUTOF10: 4

## 2020-01-21 ASSESSMENT — PAIN DESCRIPTION - ORIENTATION: ORIENTATION: LOWER

## 2020-01-21 ASSESSMENT — PAIN DESCRIPTION - PAIN TYPE: TYPE: CHRONIC PAIN

## 2020-01-21 NOTE — PROGRESS NOTES
and schedule. She has no nausea or vomiting currently and her weight is stable. She struggle with pain medication as her insurance is not covering the OxyContin. We talked about switching her to MS Contin. She continues to be on oxycodone for breakthrough. Also uses gabapentin for the neuropathic pain and that is helping as well. PAST MEDICAL HISTORY: has a past medical history of Asthma, Cancer [address] Congenital heart disease, Gastritis without bleeding, History of abnormal cervical Pap smear, Hyperlipidemia, Pressure ulcer of coccygeal region, stage 2 ([facility] [facility] and Seasonal allergies. PAST SURGICAL HISTORY: has a past surgical history that includes Breast reduction surgery (1982); Colonoscopy (2002); Breast biopsy (2009); Endoscopy, colon, diagnostic; pr gi endoscopic ultrasound (N/A, 11/7/2018); TUNNELED CENTRAL VENOUS CATHETER W/ SUBCUTANEOUS PORT; and laparoscopy. CURRENT MEDICATIONS:  has a current medication list which includes the following prescription(s): promethazine, oxycodone, oxycodone hcl, lipase-protease-amylase, gabapentin, olanzapine, polyethylene glycol, naloxegol, ibuprofen, lorazepam, senna-docusate, prochlorperazine, aspirin, lidocaine-prilocaine, and ondansetron, and the following Facility-Administered Medications: sodium chloride, potassium chloride 10 mEq, magnesium sulfate 1 g in sodium chloride 0.9 % 500 mL IVPB, potassium chloride 10 mEq, magnesium sulfate 1 g in sodium chloride 0.9 % 500 mL IVPB, sodium chloride flush, and heparin flush. ALLERGIES:  has No Known Allergies. FAMILY HISTORY: Mother: breast cancer    SOCIAL HISTORY:  reports that she has never smoked. She has never used smokeless tobacco. She reports that she does not drink alcohol or use drugs. REVIEW OF SYSTEMS:   General: No fever or night sweats.  +Weight regain  ENT: No double or blurred vision, no tinnitus or hearing problem, no dysphagia or sore throat   Respiratory: No chest pain, no shortness of breath, no cough or hemoptysis. Cardiovascular: Denies chest pain, PND or orthopnea or palpitations, or LE swelling  Gastrointestinal: Nausea and vomiting, ABD pain not fully controlled, no diarrhea, constipation  Genitourinary: Denies dysuria, hematuria, frequency, urgency or incontinence. Neurological: Denies headaches, decreased LOC, no sensory or motor focal deficits. Sensory neuropathy around mid-thoracic area - worse  Musculoskeletal: No arthralgia no back pain or joint swelling. Skin: There are no rashes or bleeding. Psychiatric:  No anxiety, no depression. Endocrine: No diabetes or thyroid disease. Hematologic: No bleeding, no adenopathy. PHYSICAL EXAM: Shows a well appearing 64y.o.-year-old female who is not in pain or distress. Vital Signs: Last menstrual period 09/07/2012, not currently breastfeeding. HEENT: Normocephalic and atraumatic. Pupils are equal, round, reactive to light and accommodation. Extraocular muscles are intact. Neck: Showed no JVD, no carotid bruit. Lungs: Some decreased air entry, mostly right side Heart: Regular without any murmur. Tachycardic Abdomen: Soft, slight tenderness in epigastric area. No hepatosplenomegaly. Extremities: Lower extremities show no edema, No clubbing, or cyanosis. Breasts: Examination not done today.  Neuro exam: intact cranial nerves bilaterally, she continues to have weakness and numbness in both lower extremities but continued improvement compared to last exam Lymphatic: no adenopathy appreciated in the supraclavicular, axillary, cervical or inguinal area    REVIEW OF LABORATORY DATA:   Lab Results   Component Value Date    WBC 4.0 01/02/2020    HGB 8.3 (L) 01/02/2020    HCT 26.5 (L) 01/02/2020    MCV 84.2 01/02/2020     01/02/2020     Lab Results   Component Value Date    NEUTROABS 2.70 01/02/2020         Chemistry        Component Value Date/Time     01/02/2020 1316    K 4.1 01/02/2020 1316     01/02/2020 1316    CO2 25

## 2020-01-21 NOTE — PROGRESS NOTES
Pt here for C.1D.1 Cisplatin/Gemzar. Pt ambulatory with cane, states feeling \"ok\" today. Labs drawn from port and results reviewed. Pt was seen by Dr. Jessica Tilley, order rec'd to proceed with tx. Written and verbal education provided on Cisplatin, new chemo consent signed. Pt was treated without incident and d/c'd in stable condition. Pt will receive wkly hydration at home per home care. Advised she call with any problems or concerns. Pt will return on 2/4/20 for f/u with Dr. Jessica Tilley and C.1D.15 tx.

## 2020-01-22 RX ORDER — OLANZAPINE 5 MG/1
5 TABLET ORAL NIGHTLY
Qty: 90 TABLET | Refills: 1 | Status: SHIPPED | OUTPATIENT
Start: 2020-01-22

## 2020-01-22 RX ORDER — OLANZAPINE 5 MG/1
5 TABLET ORAL NIGHTLY
Qty: 90 TABLET | Refills: 1 | Status: SHIPPED | OUTPATIENT
Start: 2020-01-22 | End: 2020-01-22 | Stop reason: SDUPTHER

## 2020-01-29 ENCOUNTER — TELEPHONE (OUTPATIENT)
Dept: ONCOLOGY | Age: 60
End: 2020-01-29

## 2020-01-29 NOTE — TELEPHONE ENCOUNTER
Kacie Rome called from [facility] stating pt had hydration ordered weekly x12 weeks, and has completed all 12 weeks. He is asking if Dr. Vijaya Bright would like to add more hydration to this order or if she is done. Kacie Rome can be reached at [phone]. Will discuss with Dr. Vijaya Bright and will call Kacie Rome back with recommendations.

## 2020-01-30 ENCOUNTER — TELEPHONE (OUTPATIENT)
Dept: ONCOLOGY | Age: 60
End: 2020-01-30

## 2020-01-30 NOTE — TELEPHONE ENCOUNTER
Writer spoke with Ambrocio Busby, and she stated that they are changing to a new computer system and were clarifying patient's pain medications. Writer reviewed pain medications with Tristin Elmore and she communicated understanding.  Wilfredo Kelley

## 2020-01-30 NOTE — TELEPHONE ENCOUNTER
Fax received from [address] [place] [address] stating that they are voiding patient's rx for Oxycontin 10mg (with directions to take 1 tablet PO q 12 hours) and adding rx for Morphine 30mg long-acting with directions to take PO BID). Writer called [address] at [phone] and call was transferred to [facility]. Writer left Lorena a detailed voicemail informing her that Dr Serge Matson wrote rx for Morphine 30mg ER on 1/21/20 for a one month supply to assure they were aware of above. Writer instructed Lorena to call office back ASAP to discuss above.  Arleen Rodríguez

## 2020-02-04 ENCOUNTER — TELEPHONE (OUTPATIENT)
Dept: ONCOLOGY | Age: 60
End: 2020-02-04

## 2020-02-04 ENCOUNTER — OFFICE VISIT (OUTPATIENT)
Dept: ONCOLOGY | Age: 60
End: 2020-02-04
Payer: COMMERCIAL

## 2020-02-04 ENCOUNTER — HOSPITAL ENCOUNTER (OUTPATIENT)
Dept: INFUSION THERAPY | Age: 60
Discharge: HOME | End: 2020-02-04
Payer: COMMERCIAL

## 2020-02-04 VITALS
HEART RATE: 108 BPM | DIASTOLIC BLOOD PRESSURE: 82 MMHG | BODY MASS INDEX: 16.24 KG/M2 | SYSTOLIC BLOOD PRESSURE: 126 MMHG | TEMPERATURE: 98 F | WEIGHT: 94.6 LBS

## 2020-02-04 DIAGNOSIS — C25.9 PANCREATIC ADENOCARCINOMA (HCC): Primary | ICD-10-CM

## 2020-02-04 DIAGNOSIS — C25.1 MALIGNANT NEOPLASM OF BODY OF PANCREAS (HCC): ICD-10-CM

## 2020-02-04 LAB
ABSOLUTE EOS #: 0.2 K/UL (ref 0–0.4)
ABSOLUTE IMMATURE GRANULOCYTE: ABNORMAL K/UL (ref 0–0.3)
ABSOLUTE LYMPH #: 0.6 K/UL (ref 1–4.8)
ABSOLUTE MONO #: 0.5 K/UL (ref 0.1–1.2)
ALBUMIN SERPL-MCNC: 3.9 G/DL (ref 3.5–5.2)
ALBUMIN/GLOBULIN RATIO: 1.3 (ref 1–2.5)
ALP BLD-CCNC: 104 U/L (ref 35–104)
ALT SERPL-CCNC: 27 U/L (ref 5–33)
ANION GAP SERPL CALCULATED.3IONS-SCNC: 9 MMOL/L (ref 9–17)
AST SERPL-CCNC: 25 U/L
BASOPHILS # BLD: 1 % (ref 0–2)
BASOPHILS ABSOLUTE: 0 K/UL (ref 0–0.2)
BILIRUB SERPL-MCNC: 0.49 MG/DL (ref 0.3–1.2)
BUN BLDV-MCNC: 14 MG/DL (ref 6–20)
BUN/CREAT BLD: ABNORMAL (ref 9–20)
CA 19-9: ABNORMAL U/ML (ref 0–35)
CALCIUM SERPL-MCNC: 9.6 MG/DL (ref 8.6–10.4)
CHLORIDE BLD-SCNC: 101 MMOL/L (ref 98–107)
CO2: 29 MMOL/L (ref 20–31)
CREAT SERPL-MCNC: 0.61 MG/DL (ref 0.5–0.9)
DIFFERENTIAL TYPE: ABNORMAL
EOSINOPHILS RELATIVE PERCENT: 5 % (ref 1–4)
GFR AFRICAN AMERICAN: >60 ML/MIN
GFR NON-AFRICAN AMERICAN: >60 ML/MIN
GFR SERPL CREATININE-BSD FRML MDRD: ABNORMAL ML/MIN/{1.73_M2}
GFR SERPL CREATININE-BSD FRML MDRD: ABNORMAL ML/MIN/{1.73_M2}
GLUCOSE BLD-MCNC: 125 MG/DL (ref 70–99)
HCT VFR BLD CALC: 27.8 % (ref 36–46)
HEMOGLOBIN: 8.9 G/DL (ref 12–16)
IMMATURE GRANULOCYTES: ABNORMAL %
LYMPHOCYTES # BLD: 17 % (ref 24–44)
MAGNESIUM: 2.1 MG/DL (ref 1.6–2.6)
MCH RBC QN AUTO: 27.2 PG (ref 26–34)
MCHC RBC AUTO-ENTMCNC: 32 G/DL (ref 31–37)
MCV RBC AUTO: 85.1 FL (ref 80–100)
MONOCYTES # BLD: 13 % (ref 2–11)
NRBC AUTOMATED: ABNORMAL PER 100 WBC
PDW BLD-RTO: 19 % (ref 12.5–15.4)
PLATELET # BLD: 200 K/UL (ref 140–450)
PLATELET ESTIMATE: ABNORMAL
PMV BLD AUTO: 8 FL (ref 6–12)
POTASSIUM SERPL-SCNC: 4.2 MMOL/L (ref 3.7–5.3)
RBC # BLD: 3.27 M/UL (ref 4–5.2)
RBC # BLD: ABNORMAL 10*6/UL
SEG NEUTROPHILS: 64 % (ref 36–66)
SEGMENTED NEUTROPHILS ABSOLUTE COUNT: 2.2 K/UL (ref 1.8–7.7)
SODIUM BLD-SCNC: 139 MMOL/L (ref 135–144)
TOTAL PROTEIN: 7 G/DL (ref 6.4–8.3)
WBC # BLD: 3.4 K/UL (ref 3.5–11)
WBC # BLD: ABNORMAL 10*3/UL

## 2020-02-04 PROCEDURE — 96417 CHEMO IV INFUS EACH ADDL SEQ: CPT

## 2020-02-04 PROCEDURE — 83735 ASSAY OF MAGNESIUM: CPT

## 2020-02-04 PROCEDURE — 96375 TX/PRO/DX INJ NEW DRUG ADDON: CPT

## 2020-02-04 PROCEDURE — 80053 COMPREHEN METABOLIC PANEL: CPT

## 2020-02-04 PROCEDURE — 2580000003 HC RX 258: Performed by: INTERNAL MEDICINE

## 2020-02-04 PROCEDURE — 99214 OFFICE O/P EST MOD 30 MIN: CPT | Performed by: INTERNAL MEDICINE

## 2020-02-04 PROCEDURE — 36591 DRAW BLOOD OFF VENOUS DEVICE: CPT

## 2020-02-04 PROCEDURE — 96367 TX/PROPH/DG ADDL SEQ IV INF: CPT

## 2020-02-04 PROCEDURE — 96361 HYDRATE IV INFUSION ADD-ON: CPT

## 2020-02-04 PROCEDURE — 6360000002 HC RX W HCPCS: Performed by: INTERNAL MEDICINE

## 2020-02-04 PROCEDURE — 96413 CHEMO IV INFUSION 1 HR: CPT

## 2020-02-04 PROCEDURE — 86301 IMMUNOASSAY TUMOR CA 19-9: CPT

## 2020-02-04 PROCEDURE — 85025 COMPLETE CBC W/AUTO DIFF WBC: CPT

## 2020-02-04 RX ORDER — SODIUM CHLORIDE 0.9 % (FLUSH) 0.9 %
10 SYRINGE (ML) INJECTION PRN
Status: DISCONTINUED | OUTPATIENT
Start: 2020-02-04 | End: 2020-02-05 | Stop reason: HOSPADM

## 2020-02-04 RX ORDER — DEXAMETHASONE SODIUM PHOSPHATE 10 MG/ML
10 INJECTION INTRAMUSCULAR; INTRAVENOUS ONCE
Status: COMPLETED | OUTPATIENT
Start: 2020-02-04 | End: 2020-02-04

## 2020-02-04 RX ORDER — HEPARIN SODIUM (PORCINE) LOCK FLUSH IV SOLN 100 UNIT/ML 100 UNIT/ML
500 SOLUTION INTRAVENOUS PRN
Status: DISCONTINUED | OUTPATIENT
Start: 2020-02-04 | End: 2020-02-05 | Stop reason: HOSPADM

## 2020-02-04 RX ORDER — PALONOSETRON 0.05 MG/ML
0.25 INJECTION, SOLUTION INTRAVENOUS ONCE
Status: COMPLETED | OUTPATIENT
Start: 2020-02-04 | End: 2020-02-04

## 2020-02-04 RX ORDER — SODIUM CHLORIDE 9 MG/ML
20 INJECTION, SOLUTION INTRAVENOUS ONCE
Status: COMPLETED | OUTPATIENT
Start: 2020-02-04 | End: 2020-02-04

## 2020-02-04 RX ADMIN — FOSAPREPITANT 150 MG: 150 INJECTION, POWDER, LYOPHILIZED, FOR SOLUTION INTRAVENOUS at 10:49

## 2020-02-04 RX ADMIN — POTASSIUM CHLORIDE: 2 INJECTION, SOLUTION, CONCENTRATE INTRAVENOUS at 12:58

## 2020-02-04 RX ADMIN — Medication 10 ML: at 09:15

## 2020-02-04 RX ADMIN — DEXAMETHASONE SODIUM PHOSPHATE 10 MG: 10 INJECTION INTRAMUSCULAR; INTRAVENOUS at 10:38

## 2020-02-04 RX ADMIN — Medication 10 ML: at 14:14

## 2020-02-04 RX ADMIN — SODIUM CHLORIDE 20 ML/HR: 9 INJECTION, SOLUTION INTRAVENOUS at 09:25

## 2020-02-04 RX ADMIN — CISPLATIN: 100 INJECTION, SOLUTION INTRAVENOUS at 11:54

## 2020-02-04 RX ADMIN — PALONOSETRON HYDROCHLORIDE 0.25 MG: 0.25 INJECTION, SOLUTION INTRAVENOUS at 10:37

## 2020-02-04 RX ADMIN — POTASSIUM CHLORIDE: 2 INJECTION, SOLUTION, CONCENTRATE INTRAVENOUS at 09:25

## 2020-02-04 RX ADMIN — HEPARIN 500 UNITS: 100 SYRINGE at 14:14

## 2020-02-04 RX ADMIN — Medication 10 ML: at 09:16

## 2020-02-04 RX ADMIN — GEMCITABINE 1400 MG: 38 INJECTION, SOLUTION INTRAVENOUS at 11:21

## 2020-02-04 NOTE — TELEPHONE ENCOUNTER
Pt was seen today by Dr. Vijaya Bright during tx. Per AVS, pt is scheduled for f/u on 02-18-20 to be seen during tx. Schedule sent to infusion printer/Patricia RODRIGUEZ notified.

## 2020-02-04 NOTE — TELEPHONE ENCOUNTER
Name: Nathaniel Tapia  : 1960  MRN: [id number]    Oncology Navigation Follow-Up Note    Contact Type:  Medical Oncology  Notes: Pt @ [facility] for Dr. Jasmyn Dave f/u & tx. Met with pt & pt's daughter in infusion area. Pt denied questions/concerns/issues. Instructed pt may contact writer prn. Will continue to follow.     Electronically signed by Rosita Harp RN on 2020 at 9:28 AM

## 2020-02-04 NOTE — PROGRESS NOTES
Pt here for C1D15 Gemzar and Cisplatin. Denies any new complaints. Labs drawn from port and results reviewed. Pt seen by Dr Floyd Valladares at chair side for follow up, refer to his note. Pt was treated without incident and d/c'd in stable condition. Pt will return on 2-18-20 for C2D1.

## 2020-02-04 NOTE — PROGRESS NOTES
with single episode of vomiting and decreased neuropathy and fatigue. She has had some constipation and started taking Miralax. Her appetite is intermittent and she is working to maintain nutrition. PAST MEDICAL HISTORY: has a past medical history of Asthma, Cancer ([address] Congenital heart disease, Gastritis without bleeding, History of abnormal cervical Pap smear, Hyperlipidemia, Pressure ulcer of coccygeal region, stage 2 ([facility] [address] and Seasonal allergies. PAST SURGICAL HISTORY: has a past surgical history that includes Breast reduction surgery (1982); Colonoscopy (2002); Breast biopsy (2009); Endoscopy, colon, diagnostic; pr gi endoscopic ultrasound (N/A, 11/7/2018); TUNNELED CENTRAL VENOUS CATHETER W/ SUBCUTANEOUS PORT; and laparoscopy. CURRENT MEDICATIONS:  has a current medication list which includes the following prescription(s): olanzapine, morphine, oxycodone hcl, gabapentin, promethazine, lipase-protease-amylase, polyethylene glycol, naloxegol, ibuprofen, senna-docusate, prochlorperazine, aspirin, lidocaine-prilocaine, ondansetron, and lorazepam, and the following Facility-Administered Medications: potassium chloride 10 mEq, magnesium sulfate 1 g in sodium chloride 0.9 % 500 mL IVPB, sodium chloride, sodium chloride flush, heparin flush, and potassium chloride 10 mEq, magnesium sulfate 1 g in sodium chloride 0.9 % 500 mL IVPB. ALLERGIES:  has No Known Allergies. FAMILY HISTORY: Mother: breast cancer    SOCIAL HISTORY:  reports that she has never smoked. She has never used smokeless tobacco. She reports that she does not drink alcohol or use drugs. REVIEW OF SYSTEMS:   General: No fever or night sweats. +Weight stable  ENT: No double or blurred vision, no tinnitus or hearing problem, no dysphagia or sore throat   Respiratory: No chest pain, no shortness of breath, no cough or hemoptysis.   Cardiovascular: Denies chest pain, PND or orthopnea or palpitations, or LE swelling  Gastrointestinal: No nausea, 1 episode vomiting, ABD pain controlled no diarrhea, some constipation - controlled  Genitourinary: Denies dysuria, hematuria, frequency, urgency or incontinence. Neurological: Denies headaches, decreased LOC, no sensory or motor focal deficits. Sensory neuropathy around mid-thoracic area - improved  Musculoskeletal: No arthralgia no back pain or joint swelling. Skin: There are no rashes or bleeding. Psychiatric:  No anxiety, no depression. Endocrine: No diabetes or thyroid disease. Hematologic: No bleeding, no adenopathy. PHYSICAL EXAM: Shows a well appearing 64y.o.-year-old female who is not in pain or distress. Vital Signs: Blood pressure 126/82, pulse 108, temperature 98 °F (36.7 °C), temperature source Oral, weight 94 lb 9.6 oz (42.9 kg), last menstrual period 09/07/2012, not currently breastfeeding. HEENT: Normocephalic and atraumatic. Pupils are equal, round, reactive to light and accommodation. Extraocular muscles are intact. Neck: Showed no JVD, no carotid bruit. Lungs: Some decreased air entry, mostly right side Heart: Regular without any murmur. Tachycardic Abdomen: Soft, slight tenderness in epigastric area. No hepatosplenomegaly. Extremities: Lower extremities show no edema, No clubbing, or cyanosis. Breasts: Examination not done today.  Neuro exam: intact cranial nerves bilaterally, she continues to have weakness and numbness in both lower extremities but continued improvement compared to last exam Lymphatic: no adenopathy appreciated in the supraclavicular, axillary, cervical or inguinal area    REVIEW OF LABORATORY DATA:   Lab Results   Component Value Date    WBC 3.4 (L) 02/04/2020    HGB 8.9 (L) 02/04/2020    HCT 27.8 (L) 02/04/2020    MCV 85.1 02/04/2020     02/04/2020     Lab Results   Component Value Date    NEUTROABS 2.20 02/04/2020         Chemistry        Component Value Date/Time     02/04/2020 0915    K 4.2 02/04/2020 0915  02/04/2020 0915    CO2 29 02/04/2020 0915    BUN 14 02/04/2020 0915    CREATININE 0.61 02/04/2020 0915        Component Value Date/Time    CALCIUM 9.6 02/04/2020 0915    ALKPHOS 104 02/04/2020 0915    AST 25 02/04/2020 0915    ALT 27 02/04/2020 0915    BILITOT 0.49 02/04/2020 0915        Results for Roselyn Pulling \"MARIA\" (MRN [id number]) as of 1/2/2020 14:30   Ref. Range 7/30/2019 11:24 10/3/2019 13:17 11/5/2019 12:08 12/19/2019 13:30   CA 19-9 Latest Ref Range: 0 - 35 U/mL 93743 (H) 72204 (H) 70180 (H) 5168 (H)         PATHOLOGY:     REVIEW OF RADIOLOGICAL RESULTS:       PATHOLOGY:       IMPRESSION:   1. Invasive ductal adenocarcinoma of the pancreas, stage III  2. Uche adjuvant FOLFIRINOX started 12/04/2018  3. Liver lesions found, removed during surgery 05/2019 and pathology confirmed metastatic disease  4. Palliative FOLFIRI started 07/2019  5. Considering SBRT to primary tumor   6. CT 09/2019 showed progression, plan to transition to Gemcitabine based therapy (cycle 1, adding abraxane with cycle 2)   7. Neuropathic pain mid-thoracic - started Gabapentin, helping   8. CT scan 12/19 shows signs of progression, chemo changed to cisplatin/ Gemzar started 1/2020     PLAN:   1. Her lab work was reviewed and discussed, her cancer marker is pending and we will continue to monitor. 2. I completed toxicity check - she is tolerating this treatment very well. 3. We will treat today as planned. 4. We discussed her pain management, her pain is currently well controlled with MS Contin 30 mg twice daily and Oxycodone 10 mg every 6 hours as needed. 5. Return in 3 weeks.          ADRIANA RENDON [facility] MD [place]  Hematologist/Medical Oncologist  Cell: [phone]

## 2020-02-10 ENCOUNTER — HOSPITAL ENCOUNTER (OUTPATIENT)
Age: 60
Setting detail: OBSERVATION
Discharge: HOME HEALTH | End: 2020-02-13
Attending: FAMILY MEDICINE | Admitting: FAMILY MEDICINE
Payer: COMMERCIAL

## 2020-02-10 ENCOUNTER — HOSPITAL ENCOUNTER (OUTPATIENT)
Dept: INFUSION THERAPY | Age: 60
Discharge: HOME | End: 2020-02-10
Payer: COMMERCIAL

## 2020-02-10 ENCOUNTER — TELEPHONE (OUTPATIENT)
Dept: ONCOLOGY | Age: 60
End: 2020-02-10

## 2020-02-10 VITALS
SYSTOLIC BLOOD PRESSURE: 147 MMHG | HEART RATE: 108 BPM | TEMPERATURE: 98.4 F | DIASTOLIC BLOOD PRESSURE: 94 MMHG | RESPIRATION RATE: 18 BRPM

## 2020-02-10 DIAGNOSIS — C25.9 PANCREATIC ADENOCARCINOMA (HCC): ICD-10-CM

## 2020-02-10 DIAGNOSIS — C25.1 MALIGNANT NEOPLASM OF BODY OF PANCREAS (HCC): Primary | ICD-10-CM

## 2020-02-10 PROBLEM — R11.2 INTRACTABLE NAUSEA AND VOMITING: Status: ACTIVE | Noted: 2020-02-10

## 2020-02-10 LAB
ABSOLUTE EOS #: 0.04 K/UL (ref 0–0.4)
ABSOLUTE IMMATURE GRANULOCYTE: ABNORMAL K/UL (ref 0–0.3)
ABSOLUTE LYMPH #: 0.74 K/UL (ref 1–4.8)
ABSOLUTE MONO #: 0.11 K/UL (ref 0.1–0.8)
ALBUMIN SERPL-MCNC: 3.8 G/DL (ref 3.5–5.2)
ALBUMIN/GLOBULIN RATIO: 1.2 (ref 1–2.5)
ALP BLD-CCNC: 99 U/L (ref 35–104)
ALT SERPL-CCNC: 27 U/L (ref 5–33)
ANION GAP SERPL CALCULATED.3IONS-SCNC: 15 MMOL/L (ref 9–17)
AST SERPL-CCNC: 34 U/L
BASOPHILS # BLD: 0 % (ref 0–2)
BASOPHILS ABSOLUTE: 0 K/UL (ref 0–0.2)
BILIRUB SERPL-MCNC: 0.69 MG/DL (ref 0.3–1.2)
BUN BLDV-MCNC: 19 MG/DL (ref 6–20)
BUN/CREAT BLD: ABNORMAL (ref 9–20)
CALCIUM SERPL-MCNC: 9.9 MG/DL (ref 8.6–10.4)
CHLORIDE BLD-SCNC: 97 MMOL/L (ref 98–107)
CO2: 25 MMOL/L (ref 20–31)
CREAT SERPL-MCNC: 0.73 MG/DL (ref 0.5–0.9)
DIFFERENTIAL TYPE: ABNORMAL
EOSINOPHILS RELATIVE PERCENT: 2 % (ref 1–4)
GFR AFRICAN AMERICAN: >60 ML/MIN
GFR NON-AFRICAN AMERICAN: >60 ML/MIN
GFR SERPL CREATININE-BSD FRML MDRD: ABNORMAL ML/MIN/{1.73_M2}
GFR SERPL CREATININE-BSD FRML MDRD: ABNORMAL ML/MIN/{1.73_M2}
GLUCOSE BLD-MCNC: 91 MG/DL (ref 70–99)
HCT VFR BLD CALC: 28.5 % (ref 36–46)
HEMOGLOBIN: 9.2 G/DL (ref 12–16)
IMMATURE GRANULOCYTES: ABNORMAL %
LYMPHOCYTES # BLD: 35 % (ref 24–44)
MAGNESIUM: 1.9 MG/DL (ref 1.6–2.6)
MCH RBC QN AUTO: 27.6 PG (ref 26–34)
MCHC RBC AUTO-ENTMCNC: 32.4 G/DL (ref 31–37)
MCV RBC AUTO: 85.1 FL (ref 80–100)
MONOCYTES # BLD: 5 % (ref 1–7)
MORPHOLOGY: NORMAL
NRBC AUTOMATED: ABNORMAL PER 100 WBC
PDW BLD-RTO: 18.4 % (ref 12.5–15.4)
PLATELET # BLD: 375 K/UL (ref 140–450)
PLATELET ESTIMATE: ABNORMAL
PMV BLD AUTO: 7.7 FL (ref 6–12)
POTASSIUM SERPL-SCNC: 3.7 MMOL/L (ref 3.7–5.3)
RBC # BLD: 3.35 M/UL (ref 4–5.2)
RBC # BLD: ABNORMAL 10*6/UL
SEG NEUTROPHILS: 58 % (ref 36–66)
SEGMENTED NEUTROPHILS ABSOLUTE COUNT: 1.21 K/UL (ref 1.8–7.7)
SODIUM BLD-SCNC: 137 MMOL/L (ref 135–144)
TOTAL PROTEIN: 6.9 G/DL (ref 6.4–8.3)
WBC # BLD: 2.1 K/UL (ref 3.5–11)
WBC # BLD: ABNORMAL 10*3/UL

## 2020-02-10 PROCEDURE — 6370000000 HC RX 637 (ALT 250 FOR IP): Performed by: NURSE PRACTITIONER

## 2020-02-10 PROCEDURE — 2500000003 HC RX 250 WO HCPCS: Performed by: NURSE PRACTITIONER

## 2020-02-10 PROCEDURE — 99218 PR INITIAL OBSERVATION CARE/DAY 30 MINUTES: CPT | Performed by: FAMILY MEDICINE

## 2020-02-10 PROCEDURE — 83735 ASSAY OF MAGNESIUM: CPT

## 2020-02-10 PROCEDURE — 96376 TX/PRO/DX INJ SAME DRUG ADON: CPT

## 2020-02-10 PROCEDURE — APPSS45 APP SPLIT SHARED TIME 31-45 MINUTES: Performed by: NURSE PRACTITIONER

## 2020-02-10 PROCEDURE — 2580000003 HC RX 258: Performed by: INTERNAL MEDICINE

## 2020-02-10 PROCEDURE — 85025 COMPLETE CBC W/AUTO DIFF WBC: CPT

## 2020-02-10 PROCEDURE — 96375 TX/PRO/DX INJ NEW DRUG ADDON: CPT

## 2020-02-10 PROCEDURE — 80053 COMPREHEN METABOLIC PANEL: CPT

## 2020-02-10 PROCEDURE — 96360 HYDRATION IV INFUSION INIT: CPT

## 2020-02-10 PROCEDURE — 96366 THER/PROPH/DIAG IV INF ADDON: CPT

## 2020-02-10 PROCEDURE — G0379 DIRECT REFER HOSPITAL OBSERV: HCPCS

## 2020-02-10 PROCEDURE — 6360000002 HC RX W HCPCS: Performed by: INTERNAL MEDICINE

## 2020-02-10 PROCEDURE — 6360000002 HC RX W HCPCS: Performed by: NURSE PRACTITIONER

## 2020-02-10 PROCEDURE — 96361 HYDRATE IV INFUSION ADD-ON: CPT

## 2020-02-10 PROCEDURE — G0378 HOSPITAL OBSERVATION PER HR: HCPCS

## 2020-02-10 PROCEDURE — 96365 THER/PROPH/DIAG IV INF INIT: CPT

## 2020-02-10 RX ORDER — SODIUM CHLORIDE 0.9 % (FLUSH) 0.9 %
10 SYRINGE (ML) INJECTION PRN
Status: CANCELLED | OUTPATIENT
Start: 2020-02-10

## 2020-02-10 RX ORDER — HEPARIN SODIUM (PORCINE) LOCK FLUSH IV SOLN 100 UNIT/ML 100 UNIT/ML
500 SOLUTION INTRAVENOUS PRN
Status: DISCONTINUED | OUTPATIENT
Start: 2020-02-10 | End: 2020-02-11 | Stop reason: HOSPADM

## 2020-02-10 RX ORDER — PROMETHAZINE HYDROCHLORIDE 25 MG/ML
12.5 INJECTION, SOLUTION INTRAMUSCULAR; INTRAVENOUS ONCE
Status: COMPLETED | OUTPATIENT
Start: 2020-02-10 | End: 2020-02-10

## 2020-02-10 RX ORDER — PROMETHAZINE HYDROCHLORIDE 25 MG/1
25 TABLET ORAL EVERY 6 HOURS PRN
Status: DISCONTINUED | OUTPATIENT
Start: 2020-02-10 | End: 2020-02-10

## 2020-02-10 RX ORDER — POLYETHYLENE GLYCOL 3350 17 G/17G
17 POWDER, FOR SOLUTION ORAL DAILY
Status: DISCONTINUED | OUTPATIENT
Start: 2020-02-10 | End: 2020-02-13 | Stop reason: HOSPADM

## 2020-02-10 RX ORDER — SODIUM CHLORIDE 0.9 % (FLUSH) 0.9 %
10 SYRINGE (ML) INJECTION EVERY 12 HOURS SCHEDULED
Status: DISCONTINUED | OUTPATIENT
Start: 2020-02-10 | End: 2020-02-13 | Stop reason: HOSPADM

## 2020-02-10 RX ORDER — 0.9 % SODIUM CHLORIDE 0.9 %
1000 INTRAVENOUS SOLUTION INTRAVENOUS ONCE
Status: COMPLETED | OUTPATIENT
Start: 2020-02-10 | End: 2020-02-10

## 2020-02-10 RX ORDER — SODIUM CHLORIDE 0.9 % (FLUSH) 0.9 %
10 SYRINGE (ML) INJECTION PRN
Status: DISCONTINUED | OUTPATIENT
Start: 2020-02-10 | End: 2020-02-13 | Stop reason: HOSPADM

## 2020-02-10 RX ORDER — ASPIRIN 81 MG/1
81 TABLET, CHEWABLE ORAL DAILY
Status: DISCONTINUED | OUTPATIENT
Start: 2020-02-10 | End: 2020-02-13 | Stop reason: HOSPADM

## 2020-02-10 RX ORDER — DEXTROSE, SODIUM CHLORIDE, AND POTASSIUM CHLORIDE 5; .45; .15 G/100ML; G/100ML; G/100ML
INJECTION INTRAVENOUS CONTINUOUS
Status: DISCONTINUED | OUTPATIENT
Start: 2020-02-10 | End: 2020-02-13 | Stop reason: HOSPADM

## 2020-02-10 RX ORDER — SODIUM CHLORIDE 0.9 % (FLUSH) 0.9 %
20 SYRINGE (ML) INJECTION PRN
Status: CANCELLED | OUTPATIENT
Start: 2020-02-10

## 2020-02-10 RX ORDER — LORAZEPAM 0.5 MG/1
0.5 TABLET ORAL EVERY 6 HOURS PRN
Status: DISCONTINUED | OUTPATIENT
Start: 2020-02-10 | End: 2020-02-13 | Stop reason: HOSPADM

## 2020-02-10 RX ORDER — MORPHINE SULFATE 30 MG/1
30 TABLET, FILM COATED, EXTENDED RELEASE ORAL 2 TIMES DAILY
Status: DISCONTINUED | OUTPATIENT
Start: 2020-02-10 | End: 2020-02-13 | Stop reason: HOSPADM

## 2020-02-10 RX ORDER — SENNA AND DOCUSATE SODIUM 50; 8.6 MG/1; MG/1
1 TABLET, FILM COATED ORAL DAILY PRN
Status: DISCONTINUED | OUTPATIENT
Start: 2020-02-10 | End: 2020-02-13 | Stop reason: HOSPADM

## 2020-02-10 RX ORDER — PROCHLORPERAZINE MALEATE 5 MG/1
10 TABLET ORAL EVERY 6 HOURS PRN
Status: DISCONTINUED | OUTPATIENT
Start: 2020-02-10 | End: 2020-02-10

## 2020-02-10 RX ORDER — SCOLOPAMINE TRANSDERMAL SYSTEM 1 MG/1
1 PATCH, EXTENDED RELEASE TRANSDERMAL
Status: DISCONTINUED | OUTPATIENT
Start: 2020-02-10 | End: 2020-02-11

## 2020-02-10 RX ORDER — PROMETHAZINE HYDROCHLORIDE 25 MG/ML
6.25 INJECTION, SOLUTION INTRAMUSCULAR; INTRAVENOUS EVERY 6 HOURS PRN
Status: DISCONTINUED | OUTPATIENT
Start: 2020-02-10 | End: 2020-02-11

## 2020-02-10 RX ORDER — IBUPROFEN 200 MG
400 TABLET ORAL EVERY 6 HOURS PRN
Status: DISCONTINUED | OUTPATIENT
Start: 2020-02-10 | End: 2020-02-13 | Stop reason: HOSPADM

## 2020-02-10 RX ORDER — ACETAMINOPHEN 325 MG/1
650 TABLET ORAL EVERY 4 HOURS PRN
Status: DISCONTINUED | OUTPATIENT
Start: 2020-02-10 | End: 2020-02-13 | Stop reason: HOSPADM

## 2020-02-10 RX ORDER — SODIUM CHLORIDE 0.9 % (FLUSH) 0.9 %
10 SYRINGE (ML) INJECTION PRN
Status: DISCONTINUED | OUTPATIENT
Start: 2020-02-10 | End: 2020-02-11 | Stop reason: HOSPADM

## 2020-02-10 RX ORDER — MAGNESIUM SULFATE 1 G/100ML
1 INJECTION INTRAVENOUS PRN
Status: DISCONTINUED | OUTPATIENT
Start: 2020-02-10 | End: 2020-02-13 | Stop reason: HOSPADM

## 2020-02-10 RX ORDER — OLANZAPINE 2.5 MG/1
5 TABLET ORAL NIGHTLY
Status: DISCONTINUED | OUTPATIENT
Start: 2020-02-10 | End: 2020-02-13 | Stop reason: HOSPADM

## 2020-02-10 RX ORDER — POTASSIUM CHLORIDE 7.45 MG/ML
10 INJECTION INTRAVENOUS PRN
Status: DISCONTINUED | OUTPATIENT
Start: 2020-02-10 | End: 2020-02-13 | Stop reason: HOSPADM

## 2020-02-10 RX ORDER — ONDANSETRON 2 MG/ML
4 INJECTION INTRAMUSCULAR; INTRAVENOUS EVERY 6 HOURS PRN
Status: DISCONTINUED | OUTPATIENT
Start: 2020-02-10 | End: 2020-02-13 | Stop reason: HOSPADM

## 2020-02-10 RX ORDER — GABAPENTIN 300 MG/1
300 CAPSULE ORAL 3 TIMES DAILY
Status: DISCONTINUED | OUTPATIENT
Start: 2020-02-10 | End: 2020-02-13 | Stop reason: HOSPADM

## 2020-02-10 RX ORDER — HEPARIN SODIUM (PORCINE) LOCK FLUSH IV SOLN 100 UNIT/ML 100 UNIT/ML
500 SOLUTION INTRAVENOUS PRN
Status: CANCELLED | OUTPATIENT
Start: 2020-02-10

## 2020-02-10 RX ORDER — POTASSIUM CHLORIDE 20 MEQ/1
40 TABLET, EXTENDED RELEASE ORAL PRN
Status: DISCONTINUED | OUTPATIENT
Start: 2020-02-10 | End: 2020-02-13 | Stop reason: HOSPADM

## 2020-02-10 RX ORDER — NICOTINE 21 MG/24HR
1 PATCH, TRANSDERMAL 24 HOURS TRANSDERMAL DAILY PRN
Status: DISCONTINUED | OUTPATIENT
Start: 2020-02-10 | End: 2020-02-11

## 2020-02-10 RX ORDER — OXYCODONE HYDROCHLORIDE 5 MG/1
10 TABLET ORAL EVERY 6 HOURS PRN
Status: DISCONTINUED | OUTPATIENT
Start: 2020-02-10 | End: 2020-02-13 | Stop reason: HOSPADM

## 2020-02-10 RX ADMIN — OXYCODONE HYDROCHLORIDE 10 MG: 5 TABLET ORAL at 20:48

## 2020-02-10 RX ADMIN — POTASSIUM CHLORIDE, DEXTROSE MONOHYDRATE AND SODIUM CHLORIDE: 150; 5; 450 INJECTION, SOLUTION INTRAVENOUS at 20:53

## 2020-02-10 RX ADMIN — Medication 10 ML: at 09:15

## 2020-02-10 RX ADMIN — MORPHINE SULFATE 30 MG: 30 TABLET, EXTENDED RELEASE ORAL at 20:48

## 2020-02-10 RX ADMIN — GABAPENTIN 300 MG: 300 CAPSULE ORAL at 14:36

## 2020-02-10 RX ADMIN — OXYCODONE HYDROCHLORIDE 10 MG: 5 TABLET ORAL at 14:36

## 2020-02-10 RX ADMIN — SODIUM CHLORIDE 1000 ML: 9 INJECTION, SOLUTION INTRAVENOUS at 09:15

## 2020-02-10 RX ADMIN — SODIUM CHLORIDE 1000 ML: 9 INJECTION, SOLUTION INTRAVENOUS at 11:38

## 2020-02-10 RX ADMIN — PROMETHAZINE HYDROCHLORIDE 6.25 MG: 25 INJECTION INTRAMUSCULAR; INTRAVENOUS at 14:36

## 2020-02-10 RX ADMIN — GABAPENTIN 300 MG: 300 CAPSULE ORAL at 20:48

## 2020-02-10 RX ADMIN — PROMETHAZINE HYDROCHLORIDE 6.25 MG: 25 INJECTION INTRAMUSCULAR; INTRAVENOUS at 20:48

## 2020-02-10 RX ADMIN — Medication 10 ML: at 09:16

## 2020-02-10 RX ADMIN — PROMETHAZINE HYDROCHLORIDE 12.5 MG: 25 INJECTION INTRAMUSCULAR; INTRAVENOUS at 09:36

## 2020-02-10 RX ADMIN — PROMETHAZINE HYDROCHLORIDE 12.5 MG: 25 INJECTION INTRAMUSCULAR; INTRAVENOUS at 11:43

## 2020-02-10 RX ADMIN — POTASSIUM CHLORIDE, DEXTROSE MONOHYDRATE AND SODIUM CHLORIDE: 150; 5; 450 INJECTION, SOLUTION INTRAVENOUS at 14:37

## 2020-02-10 RX ADMIN — OLANZAPINE 5 MG: 2.5 TABLET, FILM COATED ORAL at 21:03

## 2020-02-10 ASSESSMENT — PAIN SCALES - GENERAL
PAINLEVEL_OUTOF10: 8
PAINLEVEL_OUTOF10: 5
PAINLEVEL_OUTOF10: 3
PAINLEVEL_OUTOF10: 5
PAINLEVEL_OUTOF10: 5

## 2020-02-10 ASSESSMENT — PAIN DESCRIPTION - PAIN TYPE
TYPE: CHRONIC PAIN
TYPE: CHRONIC PAIN

## 2020-02-10 NOTE — CARE COORDINATION
Case Management Initial Discharge Plan  Nathaniel Tapia,             Met with:family member patient and  to discuss discharge plans. Information verified: address, contacts, phone number, , insurance Yes  PCP: Jackie Allen  Date of last visit: 1 year ago    Insurance Provider: JOSE ARMANDO    Discharge Planning    Living Arrangements:  Spouse/Significant Other(Simultaneous filing. User may not have seen previous data.)   Support Systems:  Spouse/Significant Other, Family Members, Children, Friends/Neighbors(Simultaneous filing. User may not have seen previous data.)    Home has 1 stories  3 stairs to climb to get into front door, n/a stairs to climb to reach second floor  Location of bedroom/bathroom in home main    Patient able to perform ADL's:Independent    Current Services (outpatient & in home) home IV infusion with [facility]   DME equipment: shower chair, toilet raiser, walker, cane, w/c  DME provider: pharmacy counter      Potential Assistance Needed:  Home Care(Simultaneous filing. User may not have seen previous data.)    Patient agreeable to home care: Yes  Freedom of choice provided:  yes, continue [facility]    Prior SNF/Rehab Placement and Facility: yes, [facility]  Agreeable to SNF/Rehab: No  [place] of choice provided: n/a   Evaluation: n/a    Expected Discharge date:  20(Simultaneous filing. User may not have seen previous data.)  Patient expects to be discharged to:  home with [facility] home care (Simultaneous filing. User may not have seen previous data.)  Follow Up Appointment: Best Day/ Time: Wednesday AM(Simultaneous filing. User may not have seen previous data.)    Transportation provider: spouse  Transportation arrangements needed for discharge: No    Readmission Risk              Risk of Unplanned Readmission:        11             Does patient have a readmission risk score greater than 14?: No  If yes, follow-up appointment must be made within 7 days of discharge.      Goals of Care: Reduction in vomiting episodes. Patient has spouse and friend at bedside for support. Patient is tired and wants to rest. Spouse says he helps out at home as needed, but she is independent with most activity and helps with house chores. Discharge Plan: Plan to DC home with continued home care with [facility] for IV fluids. Referral to [facility] to notify in observation.            Electronically signed by Bonny Pedersen RN, BSN on 2/10/20 at 3:59 PM

## 2020-02-10 NOTE — PROGRESS NOTES
Pts  called and spoke with Lamont Tejada RN about how his wife tolerated this past weekend   Pt brought in for hydration and labs  Pt/ state she had a terrible weekend and was nauseated and threw up all weekend, states shes been taking her phenergan but its not working   1L bolus and 12.5 mg Phenergan given pt states she still feels nauseated and poor requesting admission   Dr Jen Andrade notified ok with direct admit to Mehnaz Thomas with hospitalist admitting also to give an additional L bolus and 12.5mg phenergan  Call placed to access   Bed 323   Report called to Shamar Allison RN transported to [phone] via wheelchair   [address] [place] [address]

## 2020-02-10 NOTE — H&P
[facility]    HISTORY AND PHYSICAL EXAMINATION            Date:   2/10/2020  Patient name:  Gustabo Bradley  Date of admission:  2/10/2020  2:05 PM  MRN:   [id number]  Account:  [de-identified]  YOB: 1960  PCP:    Jackie Allen  Room:   323/323-01  Code Status:    Full Code    Chief Complaint:     Uncontrolled nausea    History Obtained From:     patient, spouse, electronic medical record    History of Present Illness:     Gustabo Bradley is a 64 y.o. Non-/non  female who presents with No chief complaint on file. and is admitted to the hospital for the management of Intractable nausea and vomiting. Patient has known pancreatic ca that was diagnosed in 2018. She recently started a new chemotherapy regimen of Gemzar and Cisplatin. Last Tuesday she received her 2nd round of this chemo regimen. The first round she initially tolerated well. The day after her 1st round she received IVF at home. On Thursday, she started throwing up and had severe nausea. Her nausea persisted throughout the following days and up through her IV hydration appointment today. Decision was made to admit for nausea control and IV hydration. Patient was given Phenergan while in the infusion center and is sedate at time of assessment- nausea controlled. Oncology consulted.     Past Medical History:     Past Medical History:   Diagnosis Date    Asthma     Cancer ([address]     pancreatic ca  dx 11/15/18    Congenital heart disease     Gastritis without bleeding 10/18/2018    History of abnormal cervical Pap smear 2000    Pre-Cancerous Cells    Hyperlipidemia     ratio ok    Pressure ulcer of coccygeal region, stage 2 ([address] 2/28/2019    Seasonal allergies         Past Surgical History:     Past Surgical History:   Procedure Laterality Date    BREAST BIOPSY  2009    BREAST REDUCTION SURGERY  1982    COLONOSCOPY  2002    ENDOSCOPY, COLON, DIAGNOSTIC      % cream Apply topically as needed. Apply a quarter size amount to port site 1 hour before chemotherapy appointment. Cover with plastic wrap. 18   Marjorie Alvarenga MD   ondansetron (ZOFRAN-ODT) 8 MG TBDP disintegrating tablet Take 1 tablet by mouth every 8 hours as needed for Nausea or Vomiting 18   Tad Cagle MD        Allergies:     Patient has no known allergies. Social History:     Tobacco:    reports that she has never smoked. She has never used smokeless tobacco.  Alcohol:      reports no history of alcohol use. Drug Use:  reports no history of drug use. Family History:     Family History   Problem Relation Age of Onset    Breast Cancer Mother 64    Heart Disease Father     Coronary Art Dis Father     Ovarian Cancer Maternal Grandmother 79    Pancreatic Cancer Paternal Uncle 80    Cancer Paternal Uncle        Review of Systems:     Patient drowsy/sedate 2/ receiving phenergan- unable to obtain    Physical Exam:   BP (!) 155/79   Pulse 106   Temp 98.2 °F (36.8 °C) (Oral)   Resp 14   Ht 5' 4\" (1.626 m)   Wt 100 lb 5 oz (45.5 kg)   LMP 2012   SpO2 99%   BMI 17.22 kg/m²   Temp (24hrs), Av.3 °F (36.8 °C), Min:98.2 °F (36.8 °C), Max:98.4 °F (36.9 °C)    No results for input(s): POCGLU in the last 72 hours.   No intake or output data in the 24 hours ending 02/10/20 1926    General Appearance:  sedate, ill appearing, and in no acute distress  Mental status: oriented to person, place, and time  Head:  normocephalic, atraumatic  Eye: no icterus, redness, pupils equal and reactive, extraocular eye movements intact, conjunctiva clear  Ear: normal external ear, no discharge, hearing intact  Nose:  no drainage noted  Mouth: mucous membranes moist  Neck: supple, no JVD  Lungs: Bilateral equal air entry, clear to ausculation, no wheezing, rales or rhonchi, normal effort  Cardiovascular: normal rate, regular rhythm, no murmur, gallop, rub  Abdomen: Soft, nontender, flat, normal bowel sounds, no hepatomegaly or splenomegaly  Neurologic: sedate 2/2 medication, normal muscle tone and bulk, no abnormal sensation, normal speech,   Skin: No gross lesions, rashes, bruising or bleeding on exposed skin area  Extremities:  peripheral pulses palpable, no pedal edema or calf pain with palpation  Psych: normal affect     Investigations:      Laboratory Testing:  Recent Results (from the past 24 hour(s))   Comprehensive Metabolic Panel    Collection Time: 02/10/20  9:27 AM   Result Value Ref Range    Glucose 91 70 - 99 mg/dL    BUN 19 6 - 20 mg/dL    CREATININE 0.73 0.50 - 0.90 mg/dL    Bun/Cre Ratio NOT REPORTED 9 - 20    Calcium 9.9 8.6 - 10.4 mg/dL    Sodium 137 135 - 144 mmol/L    Potassium 3.7 3.7 - 5.3 mmol/L    Chloride 97 (L) 98 - 107 mmol/L    CO2 25 20 - 31 mmol/L    Anion Gap 15 9 - 17 mmol/L    Alkaline Phosphatase 99 35 - 104 U/L    ALT 27 5 - 33 U/L    AST 34 (H) <32 U/L    Total Bilirubin 0.69 0.3 - 1.2 mg/dL    Total Protein 6.9 6.4 - 8.3 g/dL    Alb 3.8 3.5 - 5.2 g/dL    Albumin/Globulin Ratio 1.2 1.0 - 2.5    GFR Non-African American >60 >60 mL/min    GFR African American >60 >60 mL/min    GFR Comment          GFR Staging NOT REPORTED    CBC With Auto Differential    Collection Time: 02/10/20  9:27 AM   Result Value Ref Range    WBC 2.1 (L) 3.5 - 11.0 k/uL    RBC 3.35 (L) 4.0 - 5.2 m/uL    Hemoglobin 9.2 (L) 12.0 - 16.0 g/dL    Hematocrit 28.5 (L) 36 - 46 %    MCV 85.1 80 - 100 fL    MCH 27.6 26 - 34 pg    MCHC 32.4 31 - 37 g/dL    RDW 18.4 (H) 12.5 - 15.4 %    Platelets 181 716 - 822 k/uL    MPV 7.7 6.0 - 12.0 fL    NRBC Automated NOT REPORTED per 100 WBC    Differential Type NOT REPORTED     Immature Granulocytes NOT REPORTED 0 %    Absolute Immature Granulocyte NOT REPORTED 0.00 - 0.30 k/uL    WBC Morphology NOT REPORTED     RBC Morphology NOT REPORTED     Platelet Estimate NOT REPORTED     Seg Neutrophils 58 36 - 66 %    Lymphocytes 35 24 - 44 %    Monocytes 5 1 - 7 % Eosinophils % 2 1 - 4 %    Basophils 0 0 - 2 %    Segs Absolute 1.21 (L) 1.8 - 7.7 k/uL    Absolute Lymph # 0.74 (L) 1.0 - 4.8 k/uL    Absolute Mono # 0.11 0.1 - 0.8 k/uL    Absolute Eos # 0.04 0.0 - 0.4 k/uL    Basophils Absolute 0.00 0.0 - 0.2 k/uL    Morphology Normal    Magnesium    Collection Time: 02/10/20  9:27 AM   Result Value Ref Range    Magnesium 1.9 1.6 - 2.6 mg/dL       Imaging/Diagnostics:  No results found. Assessment :      Hospital Problems           Last Modified POA    * (Principal) Intractable nausea and vomiting 2/10/2020 Yes    Pancreatic adenocarcinoma ([facility] [address] 2/10/2020 Yes    CINV (chemotherapy-induced nausea and vomiting) 2/10/2020 Yes    Leukopenia 2/10/2020 Yes          Plan:     Patient status observation in the Med/Surge    1. Nausea control  2. Resume home medications once patient more awake  3. Consult oncology  4. DVT prophy  5. GI prophylaxis  6. Cont SpO2  7. Daily BMP  8.  Advance diet slowly as diet improves    Consultations:   BREANNA Talavera NP  2/10/2020  7:26 PM    Copy sent to Dr. Ward Pope

## 2020-02-10 NOTE — DISCHARGE INSTR - COC
R53.1    Abnormal loss of weight R63.4    Partial paralysis of both lower limbs (HCC) G82.20    Moderate protein-calorie malnutrition (HCC) E44.0    Spinal cord infarction (HCC) G95.11    Weakness of both lower extremities R29.898    Pressure ulcer of coccygeal region, stage 2 (HCC) L89.152    Nausea and vomiting R11.2    Moderate malnutrition (HCC) E44.0    Chemotherapy induced nausea and vomiting R11.2, T45.1X5A    Leukopenia D72.819    Intractable nausea and vomiting R11.2       Isolation/Infection:   Isolation          No Isolation        Patient Infection Status     None to display          Nurse Assessment:  Last Vital Signs: BP (!) 155/79   Pulse 106   Temp 98.2 °F (36.8 °C) (Oral)   Resp 14   Ht 5' 4\" (1.626 m)   Wt 100 lb 5 oz (45.5 kg)   LMP 09/07/2012   SpO2 99%   BMI 17.22 kg/m²     Last documented pain score (0-10 scale): Pain Level: 5  Last Weight:   Wt Readings from Last 1 Encounters:   02/10/20 100 lb 5 oz (45.5 kg)     Mental Status:  oriented, alert and logical    IV Access:  - chest port    Nursing Mobility/ADLs:  Walking   Assisted  Transfer  Independent  Bathing  Assisted  Dressing  Assisted  Toileting  Assisted  Feeding  Independent  Med Admin  Assisted  Med Delivery   whole    Wound Care Documentation and Therapy:  Wound 02/28/19 Coccyx Wound #1  (Active)   Number of days: 347        Elimination:  Continence:   · Bowel: No  · Bladder: No  Urinary Catheter: None   Colostomy/Ileostomy/Ileal Conduit: No       Date of Last BM: 2/12/20  No intake or output data in the 24 hours ending 02/10/20 1603  No intake/output data recorded.     Safety Concerns:     None    Impairments/Disabilities:      None    Nutrition Therapy:  Current Nutrition Therapy:   - Oral Diet:  General    Routes of Feeding: Oral  Liquids: No Restrictions  Daily Fluid Restriction: no  Last Modified Barium Swallow with Video (Video Swallowing Test): not done    Treatments at the Time of Hospital Discharge:

## 2020-02-11 ENCOUNTER — TELEPHONE (OUTPATIENT)
Dept: ONCOLOGY | Age: 60
End: 2020-02-11

## 2020-02-11 LAB
ALBUMIN SERPL-MCNC: 3.4 G/DL (ref 3.5–5.2)
ALBUMIN/GLOBULIN RATIO: 1.3 (ref 1–2.5)
ALP BLD-CCNC: 87 U/L (ref 35–104)
ALT SERPL-CCNC: 22 U/L (ref 5–33)
AMYLASE: 44 U/L (ref 28–100)
ANION GAP SERPL CALCULATED.3IONS-SCNC: 10 MMOL/L (ref 9–17)
AST SERPL-CCNC: 22 U/L
BILIRUB SERPL-MCNC: 0.53 MG/DL (ref 0.3–1.2)
BUN BLDV-MCNC: 12 MG/DL (ref 6–20)
BUN/CREAT BLD: ABNORMAL (ref 9–20)
CALCIUM SERPL-MCNC: 9.2 MG/DL (ref 8.6–10.4)
CHLORIDE BLD-SCNC: 99 MMOL/L (ref 98–107)
CO2: 26 MMOL/L (ref 20–31)
CREAT SERPL-MCNC: 0.69 MG/DL (ref 0.5–0.9)
GFR AFRICAN AMERICAN: >60 ML/MIN
GFR NON-AFRICAN AMERICAN: >60 ML/MIN
GFR SERPL CREATININE-BSD FRML MDRD: ABNORMAL ML/MIN/{1.73_M2}
GFR SERPL CREATININE-BSD FRML MDRD: ABNORMAL ML/MIN/{1.73_M2}
GLUCOSE BLD-MCNC: 164 MG/DL (ref 70–99)
HCT VFR BLD CALC: 28 % (ref 36–46)
HEMOGLOBIN: 8.9 G/DL (ref 12–16)
LIPASE: 31 U/L (ref 13–60)
MAGNESIUM: 1.7 MG/DL (ref 1.6–2.6)
MCH RBC QN AUTO: 27.3 PG (ref 26–34)
MCHC RBC AUTO-ENTMCNC: 31.8 G/DL (ref 31–37)
MCV RBC AUTO: 85.9 FL (ref 80–100)
NRBC AUTOMATED: ABNORMAL PER 100 WBC
PDW BLD-RTO: 17.9 % (ref 12.5–15.4)
PHOSPHORUS: 2.1 MG/DL (ref 2.6–4.5)
PLATELET # BLD: 240 K/UL (ref 140–450)
PMV BLD AUTO: 8 FL (ref 6–12)
POTASSIUM SERPL-SCNC: 4 MMOL/L (ref 3.7–5.3)
RBC # BLD: 3.26 M/UL (ref 4–5.2)
SODIUM BLD-SCNC: 135 MMOL/L (ref 135–144)
TOTAL PROTEIN: 6.1 G/DL (ref 6.4–8.3)
WBC # BLD: 2.3 K/UL (ref 3.5–11)

## 2020-02-11 PROCEDURE — 6370000000 HC RX 637 (ALT 250 FOR IP): Performed by: NURSE PRACTITIONER

## 2020-02-11 PROCEDURE — 84100 ASSAY OF PHOSPHORUS: CPT

## 2020-02-11 PROCEDURE — 6360000002 HC RX W HCPCS: Performed by: CLINICAL NURSE SPECIALIST

## 2020-02-11 PROCEDURE — 80053 COMPREHEN METABOLIC PANEL: CPT

## 2020-02-11 PROCEDURE — 36415 COLL VENOUS BLD VENIPUNCTURE: CPT

## 2020-02-11 PROCEDURE — 99223 1ST HOSP IP/OBS HIGH 75: CPT | Performed by: INTERNAL MEDICINE

## 2020-02-11 PROCEDURE — 94761 N-INVAS EAR/PLS OXIMETRY MLT: CPT

## 2020-02-11 PROCEDURE — 6360000002 HC RX W HCPCS: Performed by: NURSE PRACTITIONER

## 2020-02-11 PROCEDURE — 2700000000 HC OXYGEN THERAPY PER DAY

## 2020-02-11 PROCEDURE — 85027 COMPLETE CBC AUTOMATED: CPT

## 2020-02-11 PROCEDURE — 96366 THER/PROPH/DIAG IV INF ADDON: CPT

## 2020-02-11 PROCEDURE — 96376 TX/PRO/DX INJ SAME DRUG ADON: CPT

## 2020-02-11 PROCEDURE — 2500000003 HC RX 250 WO HCPCS: Performed by: NURSE PRACTITIONER

## 2020-02-11 PROCEDURE — APPSS30 APP SPLIT SHARED TIME 16-30 MINUTES: Performed by: CLINICAL NURSE SPECIALIST

## 2020-02-11 PROCEDURE — 82150 ASSAY OF AMYLASE: CPT

## 2020-02-11 PROCEDURE — 99225 PR SBSQ OBSERVATION CARE/DAY 25 MINUTES: CPT | Performed by: INTERNAL MEDICINE

## 2020-02-11 PROCEDURE — G0378 HOSPITAL OBSERVATION PER HR: HCPCS

## 2020-02-11 PROCEDURE — 83690 ASSAY OF LIPASE: CPT

## 2020-02-11 PROCEDURE — 83735 ASSAY OF MAGNESIUM: CPT

## 2020-02-11 RX ORDER — PROMETHAZINE HYDROCHLORIDE 25 MG/ML
12.5 INJECTION, SOLUTION INTRAMUSCULAR; INTRAVENOUS EVERY 4 HOURS PRN
Status: DISCONTINUED | OUTPATIENT
Start: 2020-02-11 | End: 2020-02-13 | Stop reason: HOSPADM

## 2020-02-11 RX ADMIN — OXYCODONE HYDROCHLORIDE 10 MG: 5 TABLET ORAL at 05:07

## 2020-02-11 RX ADMIN — GABAPENTIN 300 MG: 300 CAPSULE ORAL at 13:24

## 2020-02-11 RX ADMIN — POTASSIUM CHLORIDE, DEXTROSE MONOHYDRATE AND SODIUM CHLORIDE: 150; 5; 450 INJECTION, SOLUTION INTRAVENOUS at 11:47

## 2020-02-11 RX ADMIN — POTASSIUM CHLORIDE, DEXTROSE MONOHYDRATE AND SODIUM CHLORIDE: 150; 5; 450 INJECTION, SOLUTION INTRAVENOUS at 20:56

## 2020-02-11 RX ADMIN — OXYCODONE HYDROCHLORIDE 10 MG: 5 TABLET ORAL at 23:37

## 2020-02-11 RX ADMIN — OXYCODONE HYDROCHLORIDE 10 MG: 5 TABLET ORAL at 11:47

## 2020-02-11 RX ADMIN — GABAPENTIN 300 MG: 300 CAPSULE ORAL at 07:54

## 2020-02-11 RX ADMIN — OXYCODONE HYDROCHLORIDE 10 MG: 5 TABLET ORAL at 17:35

## 2020-02-11 RX ADMIN — MORPHINE SULFATE 30 MG: 30 TABLET, EXTENDED RELEASE ORAL at 07:54

## 2020-02-11 RX ADMIN — MORPHINE SULFATE 30 MG: 30 TABLET, EXTENDED RELEASE ORAL at 20:51

## 2020-02-11 RX ADMIN — PROMETHAZINE HYDROCHLORIDE 12.5 MG: 25 INJECTION, SOLUTION INTRAMUSCULAR; INTRAVENOUS at 17:35

## 2020-02-11 RX ADMIN — OLANZAPINE 5 MG: 2.5 TABLET, FILM COATED ORAL at 20:51

## 2020-02-11 RX ADMIN — PROMETHAZINE HYDROCHLORIDE 12.5 MG: 25 INJECTION, SOLUTION INTRAMUSCULAR; INTRAVENOUS at 09:31

## 2020-02-11 RX ADMIN — GABAPENTIN 300 MG: 300 CAPSULE ORAL at 20:51

## 2020-02-11 RX ADMIN — PROMETHAZINE HYDROCHLORIDE 12.5 MG: 25 INJECTION, SOLUTION INTRAMUSCULAR; INTRAVENOUS at 21:25

## 2020-02-11 RX ADMIN — PROMETHAZINE HYDROCHLORIDE 12.5 MG: 25 INJECTION, SOLUTION INTRAMUSCULAR; INTRAVENOUS at 13:24

## 2020-02-11 RX ADMIN — PROMETHAZINE HYDROCHLORIDE 6.25 MG: 25 INJECTION INTRAMUSCULAR; INTRAVENOUS at 04:57

## 2020-02-11 ASSESSMENT — PAIN DESCRIPTION - LOCATION
LOCATION: BACK

## 2020-02-11 ASSESSMENT — PAIN DESCRIPTION - PAIN TYPE
TYPE: CHRONIC PAIN

## 2020-02-11 ASSESSMENT — PAIN SCALES - GENERAL
PAINLEVEL_OUTOF10: 8
PAINLEVEL_OUTOF10: 4
PAINLEVEL_OUTOF10: 6
PAINLEVEL_OUTOF10: 7
PAINLEVEL_OUTOF10: 7
PAINLEVEL_OUTOF10: 8
PAINLEVEL_OUTOF10: 4
PAINLEVEL_OUTOF10: 8
PAINLEVEL_OUTOF10: 7
PAINLEVEL_OUTOF10: 5
PAINLEVEL_OUTOF10: 4
PAINLEVEL_OUTOF10: 3

## 2020-02-11 ASSESSMENT — ENCOUNTER SYMPTOMS
VOMITING: 0
ABDOMINAL PAIN: 1
TROUBLE SWALLOWING: 0
DIARRHEA: 0
NAUSEA: 1

## 2020-02-11 ASSESSMENT — PAIN DESCRIPTION - ORIENTATION
ORIENTATION: LOWER
ORIENTATION: LOWER

## 2020-02-11 NOTE — CONSULTS
Ovarian Cancer (age of onset: 79) in her maternal grandmother; Pancreatic Cancer (age of onset: 80) in her paternal uncle. Social History:   reports that she has never smoked. She has never used smokeless tobacco. She reports that she does not drink alcohol or use drugs. Medications:    Reviewed in EPIC     Allergies:  Patient has no known allergies. REVIEW OF SYSTEMS:      Review of Systems  General: no fever or night sweats, Weight loss fatigue, significant weakness. ENT: No double or blurred vision, no tinnitus or hearing problem, no dysphagia or sore throat   Respiratory: No chest pain, no shortness of breath, no cough or hemoptysis. Cardiovascular: Denies chest pain, PND or orthopnea. No L E swelling or palpitations. Gastrointestinal: Nausea vomiting and diarrhea   Genitourinary: Denies dysuria, hematuria, frequency, urgency or incontinence. Neurological: Denies headaches, decreased LOC, no sensory or motor focal deficits. Other than bilateral lower extremity weakness discussed   Musculoskeletal:  No arthralgia no back pain or joint swelling. Skin: There are no rashes or bleeding. Psychiatric:  No anxiety, no depression. Endocrine: no diabetes or thyroid disease. Hematologic: no bleeding , no adenopathy. PHYSICAL EXAM:      /82   Pulse 94   Temp 98.1 °F (36.7 °C) (Oral)   Resp 20   Ht 5' 4\" (1.626 m)   Wt 100 lb 12 oz (45.7 kg)   LMP 2012   SpO2 97%   BMI 17.29 kg/m²    Temp (24hrs), Av.1 °F (36.7 °C), Min:98 °F (36.7 °C), Max:98.2 °F (36.8 °C)      Physical Exam    Gen. Exam, showed an ill -appearing patient without evidence of distress or pain,, she lost significant weight over the last few weeks.   HEENT, normocephalic and atraumatic, PERRLA extraocular muscles are intact  Neck showed no JVD no carotid bruit, no cervical adenopathy  Chest is clear to auscultation bilaterally  Heart is regular without any murmur  Abdomen soft nontender no hepatosplenomegaly  Lower extremities showed no edema clubbing or cyanosis  Neurological examination was nonfocal, with intact cranial nerves, she has bilateral lower extremity weakness related to spinal infarction  Skin  No rashes or bruising appreciated        DATA:      Labs:       CBC:   Recent Labs     02/10/20  0927 02/11/20  0523   WBC 2.1* 2.3*   HGB 9.2* 8.9*   HCT 28.5* 28.0*    240     BMP:   Recent Labs     02/10/20  0927 02/11/20  0523    135   K 3.7 4.0   CO2 25 26   BUN 19 12   CREATININE 0.73 0.69   LABGLOM >60 >60   GLUCOSE 91 164*     PT/INR: No results for input(s): PROTIME, INR in the last 72 hours. APTT:No results for input(s): APTT in the last 72 hours. LIVER PROFILE:  Recent Labs     02/10/20  0927 02/11/20  0523   AST 34* 22   ALT 27 22   LABALBU 3.8 3.4*               IMPRESSION:    Primary Problem  Intractable nausea and vomiting    Active Hospital Problems    Diagnosis Date Noted    Intractable nausea and vomiting [R11.2] 02/10/2020    Leukopenia [D72.819] 07/23/2019    CINV (chemotherapy-induced nausea and vomiting) [R11.2, T45.1X5A] 12/18/2018    Pancreatic adenocarcinoma ([facility] [address] [C25.9] 11/16/2018       RECOMMENDATIONS:  1. The patient has chemotherapy-induced nausea and vomiting and she has tolerated irinotecan poorly. 2. Plan for hydration and supportive care  3. I had a very long discussion with the patient about her goals of care. Unfortunately she has metastatic pancreatic cancer with very poor response to agents and most recent chemotherapy has been very poorly tolerated. 4. I offered palliative care and hospice and the patient is considering. At least I got her to agree to start chemotherapy and take a hearing test.  She will go home with home care or palliative care and reassess things in 3 to 4 weeks. 5. If the patient changes her mind, she is a perfect candidate for hospice at home.   6. Case was discussed with primary team.  Initiate discharge planning

## 2020-02-11 NOTE — TELEPHONE ENCOUNTER
Name: Evi Echeverria  : 1960  MRN: [id number]    Oncology Navigation Follow-Up Note    Contact Type:  Inpatient  Notes: Pt currently admitted @ [facility].  Arrived to inpatient room, noted pt sleeping in bed & pt's spouse, Laron Colon, @ bedside. Laron Colon stated Dr. Ricco Dejesus recently rounded. Per Laron Dejesus stated chemotherapy stopped r/t pt's toxcities & discussed hospice. Laron Colon stated pt falling asleep during Dr. Luna's visit. Per Laron Colon pt unable to remember Dr. Ricco Dejesus discussing stopping chemotherapy. Laron Colon stated unsure if pt will be agreeable to hospice. Laron Colon noted crying & stated unsure what to do. Discussed informational hospice meeting with pt & Laron Colon. Discussed pt does not need to commit to hospice with informational meeting. Laron Colon stated Rogelio Montague asked about trying chemotherapy again if she gets stronger\". Discussed ability to revoke hospice @ anytime once pt signs on for hospice care. Laron Colon stated \"I wouldn't even know who we would talk to\". Laron Colon stated would like to take pt home with hospice if pt agreeable. Discussed inpatient hospice & home hospice choices. Spoke with Oziel Gruberj  palliative care nurse, updated on conversation with Laron Colon & notified pt lives in [facility]. Ofelia Joel stated [facility] area, Laron Colon updated. Laron Colon stated \"what do I do when she wakes up and if she can't remember\". Arielle Atkinson may contact writer. Laron Colon verbalized understanding & thanked writer. Laron Colon attempted to wake pt, pt opens eyes & falls directly back to sleep. Spoke with ASHA Hernadez , & Yamile Britt ERNA nurse, updated on conversation with Laron Colon. Spoke with Dr. Ricco Dejesus @ [facility], updated on conversation with Laron Colon. Will continue to follow.       Electronically signed by Rob Leon RN on 2020 at 4:26 PM

## 2020-02-11 NOTE — PROGRESS NOTES
[facility]    [facility]    2/11/2020    8:05 AM    Name:   Deysi Perkins  MRN:     [id number]     Acct:      [de-identified]   Room:   [facility] Day:  0  Admit Date:  2/10/2020  2:05 PM    PCP:   Jackie Allen  Code Status:  Full Code    Subjective:     C/C: Intractable nausea and vomiting due to chemotherapy    Interval History Status: improved. Nausea and vomiting improved. Pete small amts clear liquids. Dr Ann Kearney talked with pt and  this am.  She is not epte the chemo and it is being placed on hold. It may be time to consider hospice. Brief History:     Deysi Perkins is a 64 y.o. female who presents with nausea and vomiting   and is admitted to the hospital for the management of Intractable nausea and vomiting due to chemotherapy.     Patient has known pancreatic ca that was diagnosed in 2018. She recently started a new chemotherapy regimen of Gemzar and Cisplatin. Last Tuesday she received her 2nd round of this chemo regimen. The first round she initially tolerated well. The day after her 1st round she received IVF at home. On Thursday, she started throwing up and had severe nausea. Her nausea persisted throughout the following days and up through her IV hydration appointment today. Decision was made to admit for nausea control and IV hydration. Patient was given Phenergan while in the infusion center and is sedate at time of assessment- nausea controlled. Oncology consulted. Review of Systems:     Review of Systems   Constitutional: Positive for appetite change and fatigue. HENT: Negative for trouble swallowing. Gastrointestinal: Positive for abdominal pain and nausea. Negative for diarrhea and vomiting. Genitourinary: Negative for dysuria and hematuria. All other systems reviewed and are negative. Medications:      Allergies:  No Known Allergies    Current Meds:   Scheduled Meds:    aspirin  81 mg Oral Daily    Yes    Pancreatic adenocarcinoma ([facility] [address] 2/10/2020 Yes    CINV (chemotherapy-induced nausea and vomiting) 2/10/2020 Yes    Leukopenia 2/10/2020 Yes          Plan:     1. Continue IV fluids  2. DC scopolamine patch as  feels it gives her diarrhea  3. Continue supportive care, advance diet  4.  If tolerates p.o. diet may be able to discharge tomorrow    BREANNA Robbins - CNS  2/11/2020  8:05 AM

## 2020-02-12 PROCEDURE — 6360000002 HC RX W HCPCS: Performed by: CLINICAL NURSE SPECIALIST

## 2020-02-12 PROCEDURE — 6370000000 HC RX 637 (ALT 250 FOR IP): Performed by: NURSE PRACTITIONER

## 2020-02-12 PROCEDURE — 99224 PR SBSQ OBSERVATION CARE/DAY 15 MINUTES: CPT | Performed by: CLINICAL NURSE SPECIALIST

## 2020-02-12 PROCEDURE — 96376 TX/PRO/DX INJ SAME DRUG ADON: CPT

## 2020-02-12 PROCEDURE — 2500000003 HC RX 250 WO HCPCS: Performed by: NURSE PRACTITIONER

## 2020-02-12 PROCEDURE — G0378 HOSPITAL OBSERVATION PER HR: HCPCS

## 2020-02-12 PROCEDURE — 99232 SBSQ HOSP IP/OBS MODERATE 35: CPT | Performed by: INTERNAL MEDICINE

## 2020-02-12 PROCEDURE — 96366 THER/PROPH/DIAG IV INF ADDON: CPT

## 2020-02-12 RX ADMIN — PROMETHAZINE HYDROCHLORIDE 12.5 MG: 25 INJECTION, SOLUTION INTRAMUSCULAR; INTRAVENOUS at 11:39

## 2020-02-12 RX ADMIN — POTASSIUM CHLORIDE, DEXTROSE MONOHYDRATE AND SODIUM CHLORIDE: 150; 5; 450 INJECTION, SOLUTION INTRAVENOUS at 05:35

## 2020-02-12 RX ADMIN — GABAPENTIN 300 MG: 300 CAPSULE ORAL at 08:27

## 2020-02-12 RX ADMIN — OXYCODONE HYDROCHLORIDE 10 MG: 5 TABLET ORAL at 05:34

## 2020-02-12 RX ADMIN — OLANZAPINE 5 MG: 2.5 TABLET, FILM COATED ORAL at 20:04

## 2020-02-12 RX ADMIN — MORPHINE SULFATE 30 MG: 30 TABLET, EXTENDED RELEASE ORAL at 08:28

## 2020-02-12 RX ADMIN — OXYCODONE HYDROCHLORIDE 10 MG: 5 TABLET ORAL at 16:05

## 2020-02-12 RX ADMIN — PROMETHAZINE HYDROCHLORIDE 12.5 MG: 25 INJECTION, SOLUTION INTRAMUSCULAR; INTRAVENOUS at 16:05

## 2020-02-12 RX ADMIN — OXYCODONE HYDROCHLORIDE 10 MG: 5 TABLET ORAL at 22:35

## 2020-02-12 RX ADMIN — MORPHINE SULFATE 30 MG: 30 TABLET, EXTENDED RELEASE ORAL at 20:04

## 2020-02-12 RX ADMIN — GABAPENTIN 300 MG: 300 CAPSULE ORAL at 13:47

## 2020-02-12 RX ADMIN — OXYCODONE HYDROCHLORIDE 10 MG: 5 TABLET ORAL at 11:39

## 2020-02-12 RX ADMIN — GABAPENTIN 300 MG: 300 CAPSULE ORAL at 20:04

## 2020-02-12 RX ADMIN — PROMETHAZINE HYDROCHLORIDE 12.5 MG: 25 INJECTION, SOLUTION INTRAMUSCULAR; INTRAVENOUS at 07:12

## 2020-02-12 RX ADMIN — PROMETHAZINE HYDROCHLORIDE 12.5 MG: 25 INJECTION, SOLUTION INTRAMUSCULAR; INTRAVENOUS at 20:03

## 2020-02-12 RX ADMIN — PROMETHAZINE HYDROCHLORIDE 12.5 MG: 25 INJECTION, SOLUTION INTRAMUSCULAR; INTRAVENOUS at 02:12

## 2020-02-12 ASSESSMENT — PAIN SCALES - GENERAL
PAINLEVEL_OUTOF10: 5
PAINLEVEL_OUTOF10: 5
PAINLEVEL_OUTOF10: 7
PAINLEVEL_OUTOF10: 6
PAINLEVEL_OUTOF10: 7
PAINLEVEL_OUTOF10: 4
PAINLEVEL_OUTOF10: 7
PAINLEVEL_OUTOF10: 7
PAINLEVEL_OUTOF10: 3
PAINLEVEL_OUTOF10: 3

## 2020-02-12 ASSESSMENT — ENCOUNTER SYMPTOMS
VOMITING: 0
NAUSEA: 1
DIARRHEA: 0
TROUBLE SWALLOWING: 0
ABDOMINAL PAIN: 1

## 2020-02-12 ASSESSMENT — PAIN DESCRIPTION - ORIENTATION
ORIENTATION: LOWER
ORIENTATION: LOWER

## 2020-02-12 ASSESSMENT — PAIN DESCRIPTION - LOCATION
LOCATION: BACK
LOCATION: BACK

## 2020-02-12 ASSESSMENT — PAIN DESCRIPTION - PAIN TYPE
TYPE: CHRONIC PAIN
TYPE: CHRONIC PAIN

## 2020-02-12 NOTE — CARE COORDINATION
Patient and family met with [facility] hospice and does not wish to sign up for hospice services at this time. They would like to continue with DC plan of home with [facility] home care for IV hydration. They have list of hospice agencies and are aware that they can follow up at home as desired.

## 2020-02-12 NOTE — PROGRESS NOTES
.                                                                                               Today's Date: 2/12/2020  Patient Name: Deysi Perkins  Date of admission: 2/10/2020  2:05 PM  Patient's age: 64 y.o., 1960  Admission Dx: Intractable nausea and vomiting [R11.2]      Requesting Physician: Zee Winslow MD    CHIEF COMPLAINT: Intractable nausea and vomiting , pancreatic cancer    History Obtained From:  Patient    Interval history:    Patient seen and examined. Labs and vitals reviewed. Patient resting comfortably. Patient met with hospice today. She has decided against hospice for now. Denies fever or chills. Hoping to be discharged home tomorrow. HISTORY OF PRESENT ILLNESS:      The patient is a 64 y.o.  female who is admitted to the hospital for intractable nausea and vomiting. She is known to me with history of pancreatic cancer with liver metastases. She has been receiving chemotherapy with combination of cisplatin and gemcitabine. She has been on 3 lines of chemotherapy with no response. The patient has poor tolerance to chemotherapy with admission to the hospital with intractable nausea and vomiting related to chemotherapy as well as diarrhea. She was hydrated at home and in the office but did not respond and we decided to admit her to help control her symptoms and hydrated. Patient is very cachectic has been losing a lot of weight. She has been refractory to multiple chemotherapy agents. Past Medical History:   has a past medical history of Asthma, Cancer ([address] Congenital heart disease, Gastritis without bleeding, History of abnormal cervical Pap smear, Hyperlipidemia, Pressure ulcer of coccygeal region, stage 2 ([address] and Seasonal allergies. Past Surgical History:   has a past surgical history that includes Breast reduction surgery (1982); Colonoscopy (2002); Breast biopsy (2009);  Endoscopy, colon, diagnostic; pr gi endoscopic ultrasound (N/A, 11/7/2018); weeks.  HEENT, normocephalic and atraumatic, PERRLA extraocular muscles are intact  Neck showed no JVD no carotid bruit, no cervical adenopathy  Chest is clear to auscultation bilaterally  Heart is regular without any murmur  Abdomen soft nontender no hepatosplenomegaly  Lower extremities showed no edema clubbing or cyanosis  Neurological examination was nonfocal, with intact cranial nerves, she has bilateral lower extremity weakness related to spinal infarction  Skin  No rashes or bruising appreciated        DATA:      Labs:       CBC:   Recent Labs     02/10/20  0927 02/11/20  0523   WBC 2.1* 2.3*   HGB 9.2* 8.9*   HCT 28.5* 28.0*    240     BMP:   Recent Labs     02/10/20  0927 02/11/20  0523    135   K 3.7 4.0   CO2 25 26   BUN 19 12   CREATININE 0.73 0.69   LABGLOM >60 >60   GLUCOSE 91 164*     PT/INR: No results for input(s): PROTIME, INR in the last 72 hours. APTT:No results for input(s): APTT in the last 72 hours. LIVER PROFILE:  Recent Labs     02/10/20  0927 02/11/20  0523   AST 34* 22   ALT 27 22   LABALBU 3.8 3.4*                 IMPRESSION:    Primary Problem  Intractable nausea and vomiting    Active Hospital Problems    Diagnosis Date Noted    Intractable nausea and vomiting [R11.2] 02/10/2020    Leukopenia [D72.819] 07/23/2019    CINV (chemotherapy-induced nausea and vomiting) [R11.2, T45.1X5A] 12/18/2018    Pancreatic adenocarcinoma ([facility] [address] [C25.9] 11/16/2018       RECOMMENDATIONS:  1. Personally reviewed results of lab work-up and other relevant clinical data. 2. Patient met with hospice today. She has decided against hospice. She is hoping to be discharged home tomorrow with home health care and may transition to hospice later on  3. Continue supportive care and symptom management  Continue IV fluid for hydration    Discussed with patient and Nurse.     Gary Glaser MD          This note is created with the assistance of a speech recognition program.  While intending to generate a document that actually reflects the content of the visit, the document can still have some errors including those of syntax and sound a like substitutions which may escape proof reading. It such instances, actual meaning can be extrapolated by contextual diversion.

## 2020-02-12 NOTE — PROGRESS NOTES
[facility]    Progress Note    2/12/2020    9:42 AM    Name:   Roberto Cabrera  MRN:     [id number]     Acct:      [de-identified]   Room:   323/[facility] Day:  0  Admit Date:  2/10/2020  2:05 PM    PCP:   Jackie Allen  Code Status:  Full Code    Subjective:     C/C: Intractable nausea and vomiting due to chemotherapy    Interval History Status: improved. Nausea and vomiting improved. Pete small amts clear liquids. Dr Aditya Baig talked with pt and  Feb 11. She is not pete the chemo and it is being placed on hold. Pt and family met with hospice today and decided to continue with home care services for now. Brief History:     Roberto Cabrera is a 64 y.o. female who presents with nausea and vomiting   and is admitted to the hospital for the management of Intractable nausea and vomiting due to chemotherapy.     Patient has known pancreatic ca that was diagnosed in 2018. She recently started a new chemotherapy regimen of Gemzar and Cisplatin. Last Tuesday she received her 2nd round of this chemo regimen. The first round she initially tolerated well. The day after her 1st round she received IVF at home. On Thursday, she started throwing up and had severe nausea. Her nausea persisted throughout the following days and up through her IV hydration appointment today. Decision was made to admit for nausea control and IV hydration. Patient was given Phenergan while in the infusion center and is sedate at time of assessment- nausea controlled. Oncology consulted. Review of Systems:     Review of Systems   Constitutional: Positive for appetite change and fatigue. HENT: Negative for trouble swallowing. Gastrointestinal: Positive for abdominal pain and nausea. Negative for diarrhea and vomiting. Genitourinary: Negative for dysuria and hematuria. All other systems reviewed and are negative. Medications:      Allergies:  No Known Allergies    Current Meds:   Scheduled Meds:    aspirin  81 mg Oral Daily    gabapentin  300 mg Oral TID    morphine  30 mg Oral BID    OLANZapine  5 mg Oral Nightly    polyethylene glycol  17 g Oral Daily    sodium chloride flush  10 mL Intravenous 2 times per day    famotidine (PEPCID) injection  20 mg Intravenous BID    enoxaparin  40 mg Subcutaneous Daily     Continuous Infusions:    dextrose 5% and 0.45% NaCl with KCl 20 mEq 125 mL/hr at 20 0535     PRN Meds: promethazine, ibuprofen, LORazepam, oxyCODONE HCl, sennosides-docusate sodium, sodium chloride flush, potassium chloride **OR** potassium alternative oral replacement **OR** potassium chloride, magnesium sulfate, magnesium hydroxide, ondansetron, acetaminophen    Data:     Past Medical History:   has a past medical history of Asthma, Cancer ([facility] [address] Congenital heart disease, Gastritis without bleeding, History of abnormal cervical Pap smear, Hyperlipidemia, Pressure ulcer of coccygeal region, stage 2 ([facility] [address] and Seasonal allergies. Social History:   reports that she has never smoked. She has never used smokeless tobacco. She reports that she does not drink alcohol or use drugs. Family History:   Family History   Problem Relation Age of Onset    Breast Cancer Mother 64    Heart Disease Father     Coronary Art Dis Father     Ovarian Cancer Maternal Grandmother 79    Pancreatic Cancer Paternal Uncle 80    Cancer Paternal Uncle        Vitals:  BP (!) 143/70   Pulse 100   Temp 98.2 °F (36.8 °C) (Oral)   Resp 14   Ht 5' 4\" (1.626 m)   Wt 100 lb 12 oz (45.7 kg)   LMP 2012   SpO2 97%   BMI 17.29 kg/m²   Temp (24hrs), Av °F (36.7 °C), Min:97.1 °F (36.2 °C), Max:98.7 °F (37.1 °C)    No results for input(s): POCGLU in the last 72 hours. I/O (24Hr):     Intake/Output Summary (Last 24 hours) at 2020 0950  Last data filed at 2020 1714  Gross per 24 hour   Intake --   Output 400 ml   Net -400 ml 2/10/2020 Yes    Pancreatic adenocarcinoma ([facility] [address] 2/10/2020 Yes    CINV (chemotherapy-induced nausea and vomiting) 2/10/2020 Yes    Leukopenia 2/10/2020 Yes          Plan:     1. Continue IV fluids  2. Continue supportive care, advance diet  3. Hospice meeting today.   Family and pt have decided to continue home care for now    BREANNA Plaza - CNS  2/12/2020  9:42 AM

## 2020-02-13 ENCOUNTER — TELEPHONE (OUTPATIENT)
Dept: ONCOLOGY | Age: 60
End: 2020-02-13

## 2020-02-13 VITALS
DIASTOLIC BLOOD PRESSURE: 90 MMHG | HEIGHT: 64 IN | SYSTOLIC BLOOD PRESSURE: 145 MMHG | HEART RATE: 100 BPM | OXYGEN SATURATION: 95 % | RESPIRATION RATE: 16 BRPM | BODY MASS INDEX: 19.2 KG/M2 | WEIGHT: 112.43 LBS | TEMPERATURE: 98.6 F

## 2020-02-13 PROBLEM — R11.2 INTRACTABLE NAUSEA AND VOMITING: Status: RESOLVED | Noted: 2020-02-10 | Resolved: 2020-02-13

## 2020-02-13 PROCEDURE — G0378 HOSPITAL OBSERVATION PER HR: HCPCS

## 2020-02-13 PROCEDURE — 6360000002 HC RX W HCPCS: Performed by: NURSE PRACTITIONER

## 2020-02-13 PROCEDURE — 96376 TX/PRO/DX INJ SAME DRUG ADON: CPT

## 2020-02-13 PROCEDURE — 2500000003 HC RX 250 WO HCPCS: Performed by: NURSE PRACTITIONER

## 2020-02-13 PROCEDURE — 6370000000 HC RX 637 (ALT 250 FOR IP): Performed by: NURSE PRACTITIONER

## 2020-02-13 PROCEDURE — 96372 THER/PROPH/DIAG INJ SC/IM: CPT

## 2020-02-13 PROCEDURE — 6360000002 HC RX W HCPCS: Performed by: CLINICAL NURSE SPECIALIST

## 2020-02-13 PROCEDURE — 2700000000 HC OXYGEN THERAPY PER DAY

## 2020-02-13 PROCEDURE — 96375 TX/PRO/DX INJ NEW DRUG ADDON: CPT

## 2020-02-13 PROCEDURE — 99217 PR OBSERVATION CARE DISCHARGE MANAGEMENT: CPT | Performed by: CLINICAL NURSE SPECIALIST

## 2020-02-13 RX ORDER — HEPARIN SODIUM (PORCINE) LOCK FLUSH IV SOLN 100 UNIT/ML 100 UNIT/ML
SOLUTION INTRAVENOUS
Status: DISCONTINUED
Start: 2020-02-13 | End: 2020-02-13 | Stop reason: HOSPADM

## 2020-02-13 RX ORDER — POLYETHYLENE GLYCOL 3350 17 G/17G
17 POWDER, FOR SOLUTION ORAL DAILY
Qty: 225 G | Refills: 3 | Status: SHIPPED | OUTPATIENT
Start: 2020-02-13

## 2020-02-13 RX ORDER — HEPARIN SODIUM (PORCINE) LOCK FLUSH IV SOLN 100 UNIT/ML 100 UNIT/ML
500 SOLUTION INTRAVENOUS PRN
Status: DISCONTINUED | OUTPATIENT
Start: 2020-02-13 | End: 2020-02-13 | Stop reason: HOSPADM

## 2020-02-13 RX ADMIN — OXYCODONE HYDROCHLORIDE 10 MG: 5 TABLET ORAL at 08:45

## 2020-02-13 RX ADMIN — PROMETHAZINE HYDROCHLORIDE 12.5 MG: 25 INJECTION, SOLUTION INTRAMUSCULAR; INTRAVENOUS at 00:20

## 2020-02-13 RX ADMIN — ASPIRIN 81 MG 81 MG: 81 TABLET ORAL at 08:46

## 2020-02-13 RX ADMIN — PROMETHAZINE HYDROCHLORIDE 12.5 MG: 25 INJECTION, SOLUTION INTRAMUSCULAR; INTRAVENOUS at 04:38

## 2020-02-13 RX ADMIN — FAMOTIDINE 20 MG: 10 INJECTION, SOLUTION INTRAVENOUS at 08:45

## 2020-02-13 RX ADMIN — GABAPENTIN 300 MG: 300 CAPSULE ORAL at 08:45

## 2020-02-13 RX ADMIN — OXYCODONE HYDROCHLORIDE 10 MG: 5 TABLET ORAL at 04:38

## 2020-02-13 RX ADMIN — MORPHINE SULFATE 30 MG: 30 TABLET, EXTENDED RELEASE ORAL at 08:45

## 2020-02-13 RX ADMIN — ONDANSETRON 4 MG: 2 INJECTION INTRAMUSCULAR; INTRAVENOUS at 08:46

## 2020-02-13 RX ADMIN — ENOXAPARIN SODIUM 40 MG: 40 INJECTION SUBCUTANEOUS at 08:45

## 2020-02-13 ASSESSMENT — PAIN DESCRIPTION - LOCATION: LOCATION: BACK

## 2020-02-13 ASSESSMENT — ENCOUNTER SYMPTOMS
VOMITING: 0
DIARRHEA: 0
NAUSEA: 1
TROUBLE SWALLOWING: 0
ABDOMINAL PAIN: 1

## 2020-02-13 ASSESSMENT — PAIN - FUNCTIONAL ASSESSMENT: PAIN_FUNCTIONAL_ASSESSMENT: ACTIVITIES ARE NOT PREVENTED

## 2020-02-13 ASSESSMENT — PAIN DESCRIPTION - FREQUENCY: FREQUENCY: CONTINUOUS

## 2020-02-13 ASSESSMENT — PAIN DESCRIPTION - PROGRESSION: CLINICAL_PROGRESSION: NOT CHANGED

## 2020-02-13 ASSESSMENT — PAIN SCALES - GENERAL
PAINLEVEL_OUTOF10: 7
PAINLEVEL_OUTOF10: 3
PAINLEVEL_OUTOF10: 7

## 2020-02-13 ASSESSMENT — PAIN DESCRIPTION - DESCRIPTORS: DESCRIPTORS: CRAMPING

## 2020-02-13 ASSESSMENT — PAIN DESCRIPTION - ONSET: ONSET: ON-GOING

## 2020-02-13 ASSESSMENT — PAIN DESCRIPTION - PAIN TYPE: TYPE: CHRONIC PAIN

## 2020-02-13 ASSESSMENT — PAIN DESCRIPTION - ORIENTATION: ORIENTATION: LEFT

## 2020-02-13 NOTE — PLAN OF CARE
Problem: Pain:  Description  Pain management should include both nonpharmacologic and pharmacologic interventions. Goal: Pain level will decrease  Description  Pain level will decrease  Outcome: Ongoing  Goal: Control of acute pain  Description  Control of acute pain  Outcome: Ongoing  Goal: Control of chronic pain  Description  Control of chronic pain  Outcome: Ongoing     Problem: Falls - Risk of:  Goal: Will remain free from falls  Description  Will remain free from falls  Outcome: Ongoing  Goal: Absence of physical injury  Description  Absence of physical injury  Outcome: Ongoing     Problem: Nutrition Deficit:  Goal: Ability to achieve adequate nutritional intake will improve  Description  Ability to achieve adequate nutritional intake will improve  Outcome: Ongoing     Problem: Risk for Impaired Skin Integrity  Goal: Tissue integrity - skin and mucous membranes  Description  Structural intactness and normal physiological function of skin and  mucous membranes.   Outcome: Ongoing
Problem: Pain:  Goal: Pain level will decrease  2/13/2020 1209 by Savana Salvador, RN  Outcome: Completed   Pain scale preformed per protocol and pt treated for pain as documented. Education given. Problem: Falls - Risk of:  Goal: Will remain free from falls  Outcome: Completed   Fall assessment preformed. Bed in low locked position with call light and tray table within reach. Education given. Will continue to monitor. Problem: Risk for Impaired Skin Integrity  Goal: Tissue integrity - skin and mucous membranes  Outcome: Completed   Skin assessment preformed. Pt turned every 2 hours with heals elevated off bed. Waffle mattress in place. Will continue to monitor. Problem: Nausea/Vomiting:  Goal: Absence of nausea/vomiting  Outcome: Completed   IV fluids administered as ordered and PO encouraged as tolerated. Antinausea medications administered as ordered.
Problem: Pain:  Goal: Pain level will decrease  Description  Pain level will decrease  2/11/2020 0546 by Jeremias Child RN  Outcome: Ongoing  2/10/2020 1618 by Mata Sy RN  Outcome: Ongoing    Patient continues to have pain from her pancreatic cancer. Problem: Falls - Risk of:  Goal: Will remain free from falls  Description  Will remain free from falls  2/11/2020 0546 by Jeremias Child RN  Outcome: Ongoing  2/10/2020 1618 by Mata Sy RN  Outcome: Ongoing   Patient assisted up to the bathroom with her cane. Bed in low position and wheels locked for safety. Problem: Nausea/Vomiting:  Goal: Absence of nausea/vomiting  Description  Absence of nausea/vomiting  Outcome: Ongoing  Goal: Able to drink  Description  Able to drink  Outcome: Ongoing  Goal: Able to eat  Description  Able to eat  Outcome: Ongoing   Continue to give Phenergan for nausea at this time.
drink  2/11/2020 1547 by Davonte Zavala RN  Outcome: Ongoing  2/11/2020 0546 by Jorden Cordero RN  Outcome: Ongoing  Goal: Able to eat  Description  Able to eat  2/11/2020 1547 by Davonte Zavala RN  Outcome: Ongoing  2/11/2020 0546 by Jorden Cordero RN  Outcome: Ongoing

## 2020-02-13 NOTE — DISCHARGE SUMMARY
144 mmol/L    Potassium 3.7 3.7 - 5.3 mmol/L    Chloride 97 (L) 98 - 107 mmol/L    CO2 25 20 - 31 mmol/L    Anion Gap 15 9 - 17 mmol/L    Alkaline Phosphatase 99 35 - 104 U/L    ALT 27 5 - 33 U/L    AST 34 (H) <32 U/L    Total Bilirubin 0.69 0.3 - 1.2 mg/dL    Total Protein 6.9 6.4 - 8.3 g/dL    Alb 3.8 3.5 - 5.2 g/dL    Albumin/Globulin Ratio 1.2 1.0 - 2.5    GFR Non-African American >60 >60 mL/min    GFR African American >60 >60 mL/min    GFR Comment          GFR Staging NOT REPORTED    CBC With Auto Differential    Collection Time: 02/10/20  9:27 AM   Result Value Ref Range    WBC 2.1 (L) 3.5 - 11.0 k/uL    RBC 3.35 (L) 4.0 - 5.2 m/uL    Hemoglobin 9.2 (L) 12.0 - 16.0 g/dL    Hematocrit 28.5 (L) 36 - 46 %    MCV 85.1 80 - 100 fL    MCH 27.6 26 - 34 pg    MCHC 32.4 31 - 37 g/dL    RDW 18.4 (H) 12.5 - 15.4 %    Platelets 246 592 - 071 k/uL    MPV 7.7 6.0 - 12.0 fL    NRBC Automated NOT REPORTED per 100 WBC    Differential Type NOT REPORTED     Immature Granulocytes NOT REPORTED 0 %    Absolute Immature Granulocyte NOT REPORTED 0.00 - 0.30 k/uL    WBC Morphology NOT REPORTED     RBC Morphology NOT REPORTED     Platelet Estimate NOT REPORTED     Seg Neutrophils 58 36 - 66 %    Lymphocytes 35 24 - 44 %    Monocytes 5 1 - 7 %    Eosinophils % 2 1 - 4 %    Basophils 0 0 - 2 %    Segs Absolute 1.21 (L) 1.8 - 7.7 k/uL    Absolute Lymph # 0.74 (L) 1.0 - 4.8 k/uL    Absolute Mono # 0.11 0.1 - 0.8 k/uL    Absolute Eos # 0.04 0.0 - 0.4 k/uL    Basophils Absolute 0.00 0.0 - 0.2 k/uL    Morphology Normal    Magnesium    Collection Time: 02/10/20  9:27 AM   Result Value Ref Range    Magnesium 1.9 1.6 - 2.6 mg/dL   Comprehensive Metabolic Panel w/ Reflex to MG    Collection Time: 02/11/20  5:23 AM   Result Value Ref Range    Glucose 164 (H) 70 - 99 mg/dL    BUN 12 6 - 20 mg/dL    CREATININE 0.69 0.50 - 0.90 mg/dL    Bun/Cre Ratio NOT REPORTED 9 - 20    Calcium 9.2 8.6 - 10.4 mg/dL    Sodium 135 135 - 144 mmol/L    Potassium 4.0 3.7 - 5.3 mmol/L    Chloride 99 98 - 107 mmol/L    CO2 26 20 - 31 mmol/L    Anion Gap 10 9 - 17 mmol/L    Alkaline Phosphatase 87 35 - 104 U/L    ALT 22 5 - 33 U/L    AST 22 <32 U/L    Total Bilirubin 0.53 0.3 - 1.2 mg/dL    Total Protein 6.1 (L) 6.4 - 8.3 g/dL    Alb 3.4 (L) 3.5 - 5.2 g/dL    Albumin/Globulin Ratio 1.3 1.0 - 2.5    GFR Non-African American >60 >60 mL/min    GFR African American >60 >60 mL/min    GFR Comment          GFR Staging NOT REPORTED    Magnesium    Collection Time: 02/11/20  5:23 AM   Result Value Ref Range    Magnesium 1.7 1.6 - 2.6 mg/dL   Phosphorus    Collection Time: 02/11/20  5:23 AM   Result Value Ref Range    Phosphorus 2.1 (L) 2.6 - 4.5 mg/dL   Amylase    Collection Time: 02/11/20  5:23 AM   Result Value Ref Range    Amylase 44 28 - 100 U/L   Lipase    Collection Time: 02/11/20  5:23 AM   Result Value Ref Range    Lipase 31 13 - 60 U/L   CBC    Collection Time: 02/11/20  5:23 AM   Result Value Ref Range    WBC 2.3 (L) 3.5 - 11.0 k/uL    RBC 3.26 (L) 4.0 - 5.2 m/uL    Hemoglobin 8.9 (L) 12.0 - 16.0 g/dL    Hematocrit 28.0 (L) 36 - 46 %    MCV 85.9 80 - 100 fL    MCH 27.3 26 - 34 pg    MCHC 31.8 31 - 37 g/dL    RDW 17.9 (H) 12.5 - 15.4 %    Platelets 741 824 - 246 k/uL    MPV 8.0 6.0 - 12.0 fL    NRBC Automated NOT REPORTED per 100 WBC     Radiology:  No results found. Consultations:    Consults:     Final Specialist Recommendations/Findings:   IP CONSULT TO HEM/ONC  IP CONSULT TO HOSPICE      The patient was seen and examined on day of discharge and this discharge summary is in conjunction with any daily progress note from day of discharge. Discharge plan:     Disposition: Home    Physician Follow Up:     No follow-up provider specified.      Requiring Further Evaluation/Follow Up POST HOSPITALIZATION/Incidental Findings: None    Diet: regular diet    Activity: As tolerated    Instructions to Patient: Follow up with your primary health providers    Discharge Medications: Medication List      CONTINUE taking these medications    aspirin 81 MG chewable tablet     gabapentin 300 MG capsule  Commonly known as:  Neurontin  Take 1 capsule by mouth 3 times daily for 30 days. ibuprofen 400 MG tablet  Commonly known as:  ADVIL;MOTRIN  Take 1 tablet by mouth every 6 hours as needed for Pain or Fever     lidocaine-prilocaine 2.5-2.5 % cream  Commonly known as:  EMLA  Apply topically as needed. Apply a quarter size amount to port site 1 hour before chemotherapy appointment. Cover with plastic wrap.     lipase-protease-amylase 81344 units Cpep delayed release capsule  Commonly known as:  CREON  Take 1 capsule by mouth 3 times daily (with meals)     LORazepam 0.5 MG tablet  Commonly known as:  ATIVAN     morphine 30 MG extended release tablet  Commonly known as:  MS Contin  Take 1 tablet by mouth 2 times daily for 30 days. OLANZapine 5 MG tablet  Commonly known as:  ZYPREXA  Take 1 tablet by mouth nightly     ondansetron 8 MG Tbdp disintegrating tablet  Commonly known as:  Zofran ODT  Take 1 tablet by mouth every 8 hours as needed for Nausea or Vomiting     oxyCODONE HCl 10 MG immediate release tablet  Commonly known as:  OXY-IR  Take 1 tablet by mouth every 6 hours as needed for Pain for up to 30 days.  Intended supply: 30 days     polyethylene glycol powder  Commonly known as:  GLYCOLAX  Take 17 g by mouth daily     prochlorperazine 10 MG tablet  Commonly known as:  COMPAZINE  Take 1 tablet by mouth every 6 hours as needed for Nausea     promethazine 12.5 MG tablet  Commonly known as:  PHENERGAN  Take 2 tablets by mouth every 6 hours as needed for Nausea     senna-docusate 8.6-50 MG per tablet  Commonly known as:  PERICOLACE           Where to Get Your Medications      These medications were sent to [facility], [address] [place] [address], [address]    Phone:  [phone]   · polyethylene glycol powder No discharge procedures on file. Time Spent on discharge is  31 mins in patient examination, evaluation, counseling as well as medication reconciliation, prescriptions for required medications, discharge plan and follow up. Electronically signed by   BREANNA Le  2/13/2020  7:52 AM      Thank you Dr. Corinne Dent for the opportunity to be involved in this patient's care.

## 2020-02-21 ENCOUNTER — HOSPITAL ENCOUNTER (OUTPATIENT)
Dept: GENERAL RADIOLOGY | Age: 60
Discharge: HOME | End: 2020-02-23
Payer: COMMERCIAL

## 2020-02-21 ENCOUNTER — HOSPITAL ENCOUNTER (OUTPATIENT)
Age: 60
Discharge: HOME | End: 2020-02-23
Payer: COMMERCIAL

## 2020-02-21 PROCEDURE — 73502 X-RAY EXAM HIP UNI 2-3 VIEWS: CPT

## 2020-03-12 ENCOUNTER — TELEPHONE (OUTPATIENT)
Dept: ONCOLOGY | Age: 60
End: 2020-03-12

## 2020-03-31 ENCOUNTER — TELEPHONE (OUTPATIENT)
Dept: ONCOLOGY | Age: 60
End: 2020-03-31

## 2021-03-12 NOTE — TELEPHONE ENCOUNTER
Pt called stating she is having constipation. She states she has not had a good bowel movement since Thursday. She states she tried miralax and sennokot but no relief. She used a suppository today and did have some stool, but not a lot. She denies any abdominal bloating/distention, or pain. She states he does have bowel sounds and is passing gas. She states she is eating a little less than normal, and she knows that there may just not be a lot of stool, but she is concerned and does not want to get impacted. She asked if she can use milk of mag. Advised for pt to use milk of mag, and to call office or go to er if constipation worsens, or if bowel sounds are no longer present. Pt verbalized understanding. Stretcher Alarms/Hourly Rounding

## 2021-07-11 NOTE — TELEPHONE ENCOUNTER
Name: Mervat Da Silva  : 1960  MRN: [id number]    Oncology Navigation Follow-Up Note    Contact Type:  Medical Oncology  Notes: Spoke with Dr. Mariposa Cardoso @ [facility] updated on [facility] recommendations from [address] [place] [address] pancreatic surgeon[address] Dr. Mariposa Cardoso stated will place new orders for chemotherapy now. Spoke with pt via telephone, notified new orders for chemotherapy placed & will f/u 19. Pt verbalized understanding & thanked writer. Spoke with Manjula Izquierdo,  , updated on pt. Will continue to follow.     Electronically signed by Leonela Tan RN on 7/3/2019 at 4:31 PM
Fall with Harm Risk

## 2025-03-18 NOTE — TELEPHONE ENCOUNTER
Pt called in stating she has an appt 3-9-19 with a physician to get her medical marijuana card and needs aletter from Dr. Diana Dominguez. Informed her that Dr. Diana Dominguez is not in the office this week to sign a letter. Offered her a progress note with his electronic signature. Pt agreeable. Most recent progress note emailed to pt. FAMILY HISTORY:  No pertinent family history in first degree relatives

## (undated) DEVICE — BITEBLOCK 54FR W/ DENT RIM BLOX

## (undated) DEVICE — ENDOSCOPIC ULTRASOUND FINE NEEDLE BIOPSY (FNB) DEVICE: Brand: ACQUIRE

## (undated) DEVICE — BALLOON US E LIN RNG O KT FOR FG-32UA

## (undated) DEVICE — GOWN,POLY REINFORCED,LG: Brand: MEDLINE

## (undated) DEVICE — DEFENDO AIR WATER SUCTION AND BIOPSY VALVE KIT FOR  OLYMPUS: Brand: DEFENDO AIR/WATER/SUCTION AND BIOPSY VALVE

## (undated) DEVICE — JELLY,LUBE,STERILE,FLIP TOP,TUBE,2-OZ: Brand: MEDLINE

## (undated) DEVICE — GAUZE,SPONGE,4"X4",16PLY,XRAY,STRL,LF: Brand: MEDLINE

## (undated) DEVICE — CANNULA NSL AD L2IN ETCO2 SAMP SFT CRUSH RESIST FEM AIRLFE